# Patient Record
Sex: FEMALE | Race: BLACK OR AFRICAN AMERICAN | NOT HISPANIC OR LATINO | Employment: UNEMPLOYED | URBAN - METROPOLITAN AREA
[De-identification: names, ages, dates, MRNs, and addresses within clinical notes are randomized per-mention and may not be internally consistent; named-entity substitution may affect disease eponyms.]

---

## 2014-10-17 LAB — HIV1/HIV2 ANTIBODY: NON REACTIVE

## 2016-05-23 LAB — HEP C VIRUS AB: NEGATIVE

## 2017-06-07 ENCOUNTER — HOSPITAL ENCOUNTER (EMERGENCY)
Facility: OTHER | Age: 57
Discharge: HOME | End: 2017-06-07
Attending: INTERNAL MEDICINE
Payer: COMMERCIAL

## 2017-06-07 DIAGNOSIS — M94.0 COSTOCHONDRITIS: Primary | ICD-10-CM

## 2017-06-07 LAB
ALBUMIN SERPL-MCNC: 4.3 G/DL (ref 3.3–5.5)
ALP SERPL-CCNC: 87 U/L (ref 42–141)
BILIRUB SERPL-MCNC: 0.5 MG/DL (ref 0.2–1.6)
BUN SERPL-MCNC: 11 MG/DL (ref 7–22)
CALCIUM SERPL-MCNC: 10 MG/DL (ref 8–10.3)
CHLORIDE SERPL-SCNC: 105 MMOL/L (ref 98–108)
CREAT SERPL-MCNC: 0.8 MG/DL (ref 0.6–1.2)
GLUCOSE SERPL-MCNC: 135 MG/DL (ref 73–118)
POC ALT (SGPT): 26 U/L (ref 10–47)
POC AST (SGOT): 28 U/L (ref 11–38)
POC TCO2: 27 MMOL/L (ref 18–33)
POTASSIUM BLD-SCNC: 3.9 MMOL/L (ref 3.6–5.1)
PROTEIN, POC: 8.7 G/DL (ref 6.4–8.1)
SODIUM BLD-SCNC: 141 MMOL/L (ref 128–145)

## 2017-06-07 PROCEDURE — 80053 COMPREHEN METABOLIC PANEL: CPT

## 2017-06-07 PROCEDURE — 99283 EMERGENCY DEPT VISIT LOW MDM: CPT | Mod: 25

## 2017-06-07 PROCEDURE — 85025 COMPLETE CBC W/AUTO DIFF WBC: CPT

## 2017-06-07 PROCEDURE — 84484 ASSAY OF TROPONIN QUANT: CPT

## 2017-06-07 RX ORDER — IBUPROFEN 800 MG/1
800 TABLET ORAL EVERY 8 HOURS PRN
Qty: 20 TABLET | Refills: 0
Start: 2017-06-07 | End: 2020-03-10

## 2017-06-07 RX ORDER — ASPIRIN 325 MG
TABLET ORAL
Status: DISPENSED
Start: 2017-06-07 | End: 2017-06-08

## 2017-06-07 RX ORDER — ACETAMINOPHEN 500 MG
TABLET ORAL
Status: DISPENSED
Start: 2017-06-07 | End: 2017-06-08

## 2017-06-08 NOTE — ED PROVIDER NOTES
Encounter Date: 6/7/2017       History   No chief complaint on file.    Review of patient's allergies indicates:  No Known Allergies  56-year-old female presents to the emergency department with chest pain ×2 days.  Patient states the pain is worse upon movement.      The history is provided by the patient. No  was used.   Chest Pain   The current episode started yesterday. Duration of episode(s) is 2 days. Chest pain occurs constantly. The chest pain is unchanged. The pain is associated with breathing. At its most intense, the chest pain is at 5/10. The chest pain is currently at 5/10. The quality of the pain is described as sharp. The pain does not radiate. Chest pain is worsened by certain positions. Pertinent negatives for primary symptoms include no fever and no palpitations. She tried nothing for the symptoms.     No past medical history on file.  No past surgical history on file.  No family history on file.  Social History   Substance Use Topics    Smoking status: Not on file    Smokeless tobacco: Not on file    Alcohol use Not on file     Review of Systems   Constitutional: Negative for fever.   Respiratory: Negative.    Cardiovascular: Positive for chest pain. Negative for palpitations and leg swelling.   Musculoskeletal: Negative.    Skin: Negative.    Neurological: Negative.    Hematological: Negative.    All other systems reviewed and are negative.      Physical Exam     Initial Vitals   BP Pulse Resp Temp SpO2   -- -- -- -- --     Physical Exam    Nursing note and vitals reviewed.  Constitutional: She appears well-developed and well-nourished. No distress.   HENT:   Head: Normocephalic and atraumatic.   Eyes: Conjunctivae and EOM are normal.   Neck: Normal range of motion.   Cardiovascular: Normal rate and regular rhythm.   Pulmonary/Chest: Breath sounds normal. No respiratory distress. She exhibits tenderness.   Abdominal: Soft. Bowel sounds are normal.   Musculoskeletal: Normal  range of motion.   Neurological: She is alert. She has normal strength.   Skin: Skin is warm and dry. Capillary refill takes less than 2 seconds.   Psychiatric: She has a normal mood and affect.         ED Course   Procedures  Labs Reviewed   POCT CMP - Abnormal; Notable for the following:        Result Value    POC Glucose 135 (*)     Protein 8.7 (*)     All other components within normal limits             Medical Decision Making:   Initial Assessment:   56-year-old female presents to the emergency department with chest pain ×2 days.  Patient states the pain is worse upon movement.  Differential Diagnosis:   Costochondritis  Pleurisy  Myocardial infarction  ED Management:  EKG was within normal limits, troponin was normal and CBC and CMP were unremarkable.  Patient was given instructions for costochondritis and a prescription for ibuprofen.  She was advised to follow-up with her primary care physician tomorrow for reevaluation.                   ED Course     Clinical Impression:   The primary diagnosis is costochondritis  Disposition:   Disposition: Discharged  Condition: Stable       Ajay Acevedo MD  06/08/17 0584

## 2020-03-10 ENCOUNTER — HOSPITAL ENCOUNTER (EMERGENCY)
Facility: HOSPITAL | Age: 60
Discharge: HOME | End: 2020-03-10
Attending: EMERGENCY MEDICINE
Payer: COMMERCIAL

## 2020-03-10 VITALS
RESPIRATION RATE: 16 BRPM | OXYGEN SATURATION: 99 % | TEMPERATURE: 98 F | SYSTOLIC BLOOD PRESSURE: 161 MMHG | BODY MASS INDEX: 34.23 KG/M2 | HEART RATE: 79 BPM | HEIGHT: 66 IN | DIASTOLIC BLOOD PRESSURE: 75 MMHG | WEIGHT: 213 LBS

## 2020-03-10 DIAGNOSIS — J11.1 INFLUENZA-LIKE ILLNESS: Primary | ICD-10-CM

## 2020-03-10 DIAGNOSIS — R51.9 ACUTE NONINTRACTABLE HEADACHE, UNSPECIFIED HEADACHE TYPE: ICD-10-CM

## 2020-03-10 LAB
BILIRUBIN, POC UA: NEGATIVE
BLOOD, POC UA: NEGATIVE
CLARITY, POC UA: CLEAR
COLOR, POC UA: YELLOW
CTP QC/QA: YES
GLUCOSE, POC UA: NEGATIVE
KETONES, POC UA: NEGATIVE
LEUKOCYTE EST, POC UA: NEGATIVE
NITRITE, POC UA: NEGATIVE
PH UR STRIP: 6 [PH]
POC MOLECULAR INFLUENZA A AGN: NEGATIVE
POC MOLECULAR INFLUENZA B AGN: NEGATIVE
POCT GLUCOSE: 134 MG/DL (ref 70–110)
PROTEIN, POC UA: NEGATIVE
SPECIFIC GRAVITY, POC UA: >=1.03
UROBILINOGEN, POC UA: 0.2 E.U./DL

## 2020-03-10 PROCEDURE — 87502 INFLUENZA DNA AMP PROBE: CPT | Mod: ER

## 2020-03-10 PROCEDURE — 99284 EMERGENCY DEPT VISIT MOD MDM: CPT | Mod: 25,ER

## 2020-03-10 PROCEDURE — 63600175 PHARM REV CODE 636 W HCPCS: Mod: ER | Performed by: EMERGENCY MEDICINE

## 2020-03-10 PROCEDURE — 81003 URINALYSIS AUTO W/O SCOPE: CPT | Mod: ER

## 2020-03-10 PROCEDURE — 82962 GLUCOSE BLOOD TEST: CPT | Mod: ER

## 2020-03-10 PROCEDURE — 96372 THER/PROPH/DIAG INJ SC/IM: CPT | Mod: 59,ER

## 2020-03-10 RX ORDER — DIPHENHYDRAMINE HYDROCHLORIDE 50 MG/ML
25 INJECTION INTRAMUSCULAR; INTRAVENOUS
Status: COMPLETED | OUTPATIENT
Start: 2020-03-10 | End: 2020-03-10

## 2020-03-10 RX ORDER — PROMETHAZINE HYDROCHLORIDE AND DEXTROMETHORPHAN HYDROBROMIDE 6.25; 15 MG/5ML; MG/5ML
5 SYRUP ORAL 3 TIMES DAILY PRN
Qty: 200 ML | Refills: 0 | Status: SHIPPED | OUTPATIENT
Start: 2020-03-10 | End: 2020-03-20

## 2020-03-10 RX ORDER — PROMETHAZINE HYDROCHLORIDE 25 MG/1
25 TABLET ORAL EVERY 6 HOURS PRN
Qty: 15 TABLET | Refills: 0 | Status: SHIPPED | OUTPATIENT
Start: 2020-03-10 | End: 2021-04-14 | Stop reason: ALTCHOICE

## 2020-03-10 RX ORDER — PROCHLORPERAZINE EDISYLATE 5 MG/ML
10 INJECTION INTRAMUSCULAR; INTRAVENOUS ONCE
Status: COMPLETED | OUTPATIENT
Start: 2020-03-10 | End: 2020-03-10

## 2020-03-10 RX ORDER — OSELTAMIVIR PHOSPHATE 75 MG/1
75 CAPSULE ORAL 2 TIMES DAILY
Qty: 10 CAPSULE | Refills: 0 | Status: SHIPPED | OUTPATIENT
Start: 2020-03-10 | End: 2020-03-13 | Stop reason: ALTCHOICE

## 2020-03-10 RX ORDER — IBUPROFEN 600 MG/1
600 TABLET ORAL EVERY 6 HOURS PRN
Qty: 20 TABLET | Refills: 0 | OUTPATIENT
Start: 2020-03-10 | End: 2021-02-09

## 2020-03-10 RX ORDER — DIPHENHYDRAMINE HCL 25 MG
25 CAPSULE ORAL EVERY 6 HOURS PRN
Qty: 20 CAPSULE | Refills: 0 | Status: SHIPPED | OUTPATIENT
Start: 2020-03-10 | End: 2021-04-14 | Stop reason: ALTCHOICE

## 2020-03-10 RX ORDER — KETOROLAC TROMETHAMINE 30 MG/ML
30 INJECTION, SOLUTION INTRAMUSCULAR; INTRAVENOUS
Status: COMPLETED | OUTPATIENT
Start: 2020-03-10 | End: 2020-03-10

## 2020-03-10 RX ORDER — DIPHENHYDRAMINE HYDROCHLORIDE 50 MG/ML
25 INJECTION INTRAMUSCULAR; INTRAVENOUS
Status: DISCONTINUED | OUTPATIENT
Start: 2020-03-10 | End: 2020-03-10

## 2020-03-10 RX ADMIN — DIPHENHYDRAMINE HYDROCHLORIDE 25 MG: 50 INJECTION INTRAMUSCULAR; INTRAVENOUS at 02:03

## 2020-03-10 RX ADMIN — PROCHLORPERAZINE EDISYLATE 10 MG: 5 INJECTION INTRAMUSCULAR; INTRAVENOUS at 02:03

## 2020-03-10 RX ADMIN — KETOROLAC TROMETHAMINE 30 MG: 30 INJECTION, SOLUTION INTRAMUSCULAR at 02:03

## 2020-03-10 NOTE — ED NOTES
59 y.o. Female with PMH HTN, DM, hyperlipidemia to ED with c.o. Dry mouth, cracked lips, and dry tongue x1 month, 9/10 constant headache x 3 days with no relief from OTC meds, sore throat cough, nausea, diarrhea started yesterday. Patient also endorses generalized body aches and weakness. Patient denies blurred vision with headache, denies photophobia, +PERRL, equal strength and sensation bilaterally.

## 2020-03-10 NOTE — ED PROVIDER NOTES
"Encounter Date: 3/10/2020    SCRIBE #1 NOTE: I, Janelle Chung, am scribing for, and in the presence of,  Dr. Mitchell . I have scribed the following portions of the note - Other sections scribed: HPI, ROS, PE.       History     Chief Complaint   Patient presents with    Headache     x 3 days, dry mouth     59 y.o. female with "I have the flu" and headache x 3 days. Headache is localized on bilateral sides. Headache is worsened with loud noises and bright lights.  she reports this is "not the worst headache of my life".  Patient reports she gets frequent headaches.  Patient reports cough, congestion, bodyaches, chills, rhinorrhea, and dry mouth. These symptoms began last night and she reports "I feel like I have the flu". She denies photophobia, numbness, weakness, tingling, any other signs of a stroke, nausea, or vomiting. She denies relief with Nyquil and DayQuil. Denies smoking. Reports occasional ETOH usage. Denies marijuana, cocaine or street drugs. Reports sufficient water intake. Patient is a diabetic.      The history is provided by the patient. No  was used.     Review of patient's allergies indicates:   Allergen Reactions    Oxycodone-acetaminophen Itching     Past Medical History:   Diagnosis Date    Diabetes mellitus     does not take any meds for same    High cholesterol     Hypertension      Past Surgical History:   Procedure Laterality Date     SECTION      CHOLECYSTECTOMY      HYSTERECTOMY       History reviewed. No pertinent family history.  Social History     Tobacco Use    Smoking status: Never Smoker   Substance Use Topics    Alcohol use: Yes     Comment: socially    Drug use: Not on file     Review of Systems   Constitutional: Positive for chills. Negative for fever.   HENT: Positive for congestion and rhinorrhea. Negative for sore throat.    Eyes: Negative for photophobia.   Respiratory: Negative for shortness of breath.    Cardiovascular: Negative for " chest pain.   Gastrointestinal: Negative for abdominal pain, nausea and vomiting.   Genitourinary: Negative for dysuria.   Musculoskeletal: Positive for myalgias. Negative for back pain.   Skin: Negative for rash.   Neurological: Positive for headaches. Negative for weakness and numbness.   Hematological: Does not bruise/bleed easily.   All other systems reviewed and are negative.      Physical Exam     Initial Vitals [03/10/20 1043]   BP Pulse Resp Temp SpO2   (!) 165/85 84 19 98.4 °F (36.9 °C) 98 %      MAP       --         Patient gave consent to have physical exam performed.  .    Physical Exam    Nursing note and vitals reviewed.  Constitutional: Vital signs are normal. She appears well-developed and well-nourished.   HENT:   Head: Normocephalic and atraumatic.   Right Ear: External ear normal.   Left Ear: External ear normal.   Nose: Mucosal edema and rhinorrhea present. Right sinus exhibits no maxillary sinus tenderness. Left sinus exhibits no maxillary sinus tenderness.   Mouth/Throat: Mucous membranes are normal. Posterior oropharyngeal erythema present. No oropharyngeal exudate or posterior oropharyngeal edema.   Post nasal drip appreciated on exam    Eyes: Conjunctivae are normal.   Neck: Normal range of motion. Neck supple.   Cardiovascular: Normal rate, regular rhythm, normal heart sounds, intact distal pulses and normal pulses. Exam reveals no gallop, no friction rub and no decreased pulses.    No murmur heard.  Pulmonary/Chest: Effort normal and breath sounds normal. No respiratory distress. She has no wheezes. She has no rhonchi. She has no rales. She exhibits no tenderness.   Abdominal: Soft. Normal appearance and bowel sounds are normal. She exhibits no distension. There is no tenderness. There is no rigidity, no rebound and no guarding.   Musculoskeletal: Normal range of motion. She exhibits no edema or tenderness.   Lymphadenopathy:     She has no cervical adenopathy.   Neurological: She is alert  and oriented to person, place, and time. She has normal strength. No cranial nerve deficit or sensory deficit. GCS score is 15. GCS eye subscore is 4. GCS verbal subscore is 5. GCS motor subscore is 6.   Cranial nerves II-XII intact. Sensation grossly intact. Bilateral  strength equal. Strength 5/5 to all extremities. Deep tendon reflexes normal.     Skin: Skin is warm and dry. Capillary refill takes less than 2 seconds. No rash noted.   Psychiatric: She has a normal mood and affect.         ED Course   Procedures  Labs Reviewed   POCT URINALYSIS W/O SCOPE - Abnormal; Notable for the following components:       Result Value    Spec Grav UA >=1.030 (*)     All other components within normal limits   POCT GLUCOSE - Abnormal; Notable for the following components:    POCT Glucose 134 (*)     All other components within normal limits   POCT INFLUENZA A/B MOLECULAR               Medical Decision Making:   History:   Old Medical Records: I decided to obtain old medical records.  Clinical Tests:   Lab Tests: Ordered and Reviewed  Chief complaint:  I have the flu and Headache. Patient is also worried that her sugar might be elevated.  Differential diagnosis:  Hyperglycemia, hypoglycemia, Influenza A, Influenza B, otitis media, pharyngitis, urinary tract infection, headache, and intractable headache    Treatment in the ED: PE, ketorolac injection 30 mg, diphenhydramine injection 25 mg, and prochlorperazine injection Soln 10 mg   Patient reports feeling better after treatment in the ER.      Discussed treatment, prescriptions, labs, and imaging results.    Fill and take prescriptions as directed.  Return to the ED if symptoms worsen or do not resolve.   Answered questions and discussed discharge plan.    Patient feels better and is ready for discharge.  Follow up with PCP/specialist in 1 day.            Scribe Attestation:   Scribe #1: I performed the above scribed service and the documentation accurately describes the  services I performed. I attest to the accuracy of the note.     I, Dr. Alba Mitchell, personally performed the services described in this documentation. This document was produced by a scribe under my direction and in my presence. All medical record entries made by the scribe were at my direction and in my presence.  I have reviewed the chart and agree that the record reflects my personal performance and is accurate and complete. Alba Mitchell DO.     03/10/2020 2:35 PM                        Clinical Impression:     1. Influenza-like illness    2. Acute nonintractable headache, unspecified headache type                                   Alba Mitchell DO  03/12/20 9033

## 2020-03-12 NOTE — PROGRESS NOTES
This note was created by combination of typed  and M-Modal dictation.  Transcription errors may be present.  If there are any questions, please contact me.    Assessment / Plan:   URI, acute  -getting better.  Can stop the tamiflu. Normal exam.     Type 2 diabetes mellitus without complication, without long-term current use of insulin  -hx of metformin ER with GI SE.  But would be willing to rechallenge. Start metformin 500 and titrate up to 1000 bid or until max tolerated dose.  Plan for eye cam next time  Testing supplies sent  Future labs.  -     CBC auto differential; Future; Expected date: 06/13/2020  -     Comprehensive metabolic panel; Future; Expected date: 06/13/2020  -     Lipid panel; Future; Expected date: 06/13/2020  -     Hemoglobin A1c; Future; Expected date: 06/13/2020  -     Microalbumin/creatinine urine ratio; Future; Expected date: 06/13/2020  -     metFORMIN (GLUCOPHAGE) 500 MG tablet; 1 po qAM x 1 wk; then 1 po BID x 1 wk; then 2 AM/1 PM x 1 wk; then 2 BID maintenance. Take with food  Dispense: 120 tablet; Refill: 5  -     blood-glucose meter kit; To check BG 1 times daily, to use with insurance preferred meter  Dispense: 1 each; Refill: 0  -     blood sugar diagnostic Strp; ONCE DAILY BLOOD SUGAR TESTING; WHICHEVER BRAND COVERED BY INSURANCE  Dispense: 30 strip; Refill: 11  -     lancets (ONETOUCH DELICA LANCETS) 33 gauge Misc; 1 lancet by Misc.(Non-Drug; Combo Route) route once daily. ONCE DAILY BLOOD SUGAR TESTING; [employer]IC[employer] BRAND COVERED BY INSURANCE  Dispense: 30 each; Refill: 11    Essential hypertension  -restart lisinopril and hctz. Hx of amlodipine in the past. May need to be added on if not controlled  -     lisinopriL (PRINIVIL,ZESTRIL) 40 MG tablet; Take 1 tablet (40 mg total) by mouth once daily.  Dispense: 90 tablet; Refill: 3  -     hydroCHLOROthiazide (HYDRODIURIL) 25 MG tablet; Take 1 tablet (25 mg total) by mouth once daily.  Dispense: 30 tablet; Refill:  11    Dyslipidemia  -restart lipitor.  -     atorvastatin (LIPITOR) 40 MG tablet; Take 1 tablet (40 mg total) by mouth once daily.  Dispense: 90 tablet; Refill: 3    Primary insomnia hx of melatonin without relief; hx of trazodone    Medications Discontinued During This Encounter   Medication Reason    oseltamivir (TAMIFLU) 75 MG capsule Therapy completed       meds sent this encounter:  Medications Ordered This Encounter   Medications    atorvastatin (LIPITOR) 40 MG tablet     Sig: Take 1 tablet (40 mg total) by mouth once daily.     Dispense:  90 tablet     Refill:  3    blood sugar diagnostic Strp     Sig: ONCE DAILY BLOOD SUGAR TESTING; WHICHEVER BRAND COVERED BY INSURANCE     Dispense:  30 strip     Refill:  11    blood-glucose meter kit     Sig: To check BG 1 times daily, to use with insurance preferred meter     Dispense:  1 each     Refill:  0    hydroCHLOROthiazide (HYDRODIURIL) 25 MG tablet     Sig: Take 1 tablet (25 mg total) by mouth once daily.     Dispense:  30 tablet     Refill:  11    lancets (ONETOUCH DELICA LANCETS) 33 gauge Misc     Si lancet by Misc.(Non-Drug; Combo Route) route once daily. ONCE DAILY BLOOD SUGAR TESTING; [employer]IC[employer] BRAND COVERED BY INSURANCE     Dispense:  30 each     Refill:  11    lisinopriL (PRINIVIL,ZESTRIL) 40 MG tablet     Sig: Take 1 tablet (40 mg total) by mouth once daily.     Dispense:  90 tablet     Refill:  3    metFORMIN (GLUCOPHAGE) 500 MG tablet     Si po qAM x 1 wk; then 1 po BID x 1 wk; then 2 AM/1 PM x 1 wk; then 2 BID maintenance. Take with food     Dispense:  120 tablet     Refill:  5       Follow Up: No follow-ups on file. nurse visit 2 weeks for BP check. OV 3 months with previsit labs.      Subjective:     Chief Complaint   Patient presents with    Hospital Follow Up    Generalized Body Aches    Chills       GINGER Sarmiento is a 59 y.o. female, last appointment with this clinic was Visit date not found.    No LMP recorded. Patient has had a  hysterectomy.    NP  DM2; saxagliptin  HTN; hx of hctz, amlodipine, lisinopril, hx of propranolol  Dyslipidemia, hx of rosuvastatin  GERD  Insomnia hx of melatonin without relief. Sleep hygiene improvement without relief. trazodone  Hx of headache. Hx of neuro eval for headaches. topamax    ER visit for flu like symptoms. Flu A neg. Discharged on tamiflu    2016 HCV screen negative.  2017 a1 7.3    Sx started with headache and then developed URI sx.  Loss of taste; sore throat; minimal cough; rhinorrhea. Main concern was headache and loss of taste.    The headache is much better. Still with loss of taste.  Taking tamiflu.  Wonders if SE is itching - started last night.   Has taken phenergan before without issue. Getting chills. No sick contacts/nonspecific.     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     Is not taking any meds.  Most recently taking saxagliptin, but expensive.  lisinopril, crestor.    Metformin with GI SE.  Not completely intolerable.was on ER 1000 BID.  Would be willing to rechallenge.    Patient Care Team:  Diaz Yip MD as PCP - General (Internal Medicine)    Patient Active Problem List    Diagnosis Date Noted    Primary insomnia hx of melatonin without relief; hx of trazodone 2020    GERD (gastroesophageal reflux disease) 2016    Dyslipidemia hx of crestor, lipitor 10/19/2015    Headache chronic with hx of topamax hx of propranolol 10/19/2015    Essential hypertension hx of hctz, amlodipine, lisinopril, propranolol 2014    Type 2 diabetes mellitus without complication, without long-term current use of insulin hx metformin with GI SE; saxagliptin expensive 2014       PAST MEDICAL HISTORY:  Past Medical History:   Diagnosis Date    Diabetes mellitus     does not take any meds for same    High cholesterol     Hypertension        PAST SURGICAL HISTORY:  Past Surgical History:   Procedure Laterality Date     SECTION       CHOLECYSTECTOMY      HYSTERECTOMY       Family History   Problem Relation Age of Onset    Coronary artery disease Mother     Diabetes Mother     Hypertension Mother     Stroke Father     Hypertension Father     Prostate cancer Father     Lung cancer Brother     Stomach cancer Brother        SOCIAL HISTORY:  Social History     Socioeconomic History    Marital status: Single     Spouse name: Not on file    Number of children: Not on file    Years of education: Not on file    Highest education level: Not on file   Occupational History    Occupation: care giver.     Employer: [employer]   Social Needs    Financial resource strain: Not on file    Food insecurity:     Worry: Not on file     Inability: Not on file    Transportation needs:     Medical: Not on file     Non-medical: Not on file   Tobacco Use    Smoking status: Never Smoker    Smokeless tobacco: Never Used   Substance and Sexual Activity    Alcohol use: Yes     Comment: socially    Drug use: Never    Sexual activity: Not on file   Lifestyle    Physical activity:     Days per week: Not on file     Minutes per session: Not on file    Stress: Not on file   Relationships    Social connections:     Talks on phone: Not on file     Gets together: Not on file     Attends Hindu service: Not on file     Active member of club or organization: Not on file     Attends meetings of clubs or organizations: Not on file     Relationship status: Not on file   Other Topics Concern    Not on file   Social History Narrative    Not on file        ALLERGIES AND MEDICATIONS: updated and reviewed.  Review of patient's allergies indicates:   Allergen Reactions    Oxycodone-acetaminophen Itching     Current Outpatient Medications   Medication Sig Dispense Refill    ibuprofen (ADVIL,MOTRIN) 600 MG tablet Take 1 tablet (600 mg total) by mouth every 6 (six) hours as needed for Pain (Take with food as needed for mild-to-moderate pain). 20 tablet 0     "oseltamivir (TAMIFLU) 75 MG capsule Take 1 capsule (75 mg total) by mouth 2 (two) times daily. for 5 days 10 capsule 0    promethazine (PHENERGAN) 25 MG tablet Take 1 tablet (25 mg total) by mouth every 6 (six) hours as needed for Nausea (Taken headache does not resolve). 15 tablet 0    diphenhydrAMINE (BENADRYL) 25 mg capsule Take 1 capsule (25 mg total) by mouth every 6 (six) hours as needed for Itching or Allergies (Take if headache does not resolve). (Patient not taking: Reported on 3/13/2020) 20 capsule 0    promethazine-dextromethorphan (PROMETHAZINE-DM) 6.25-15 mg/5 mL Syrp Take 5 mLs by mouth 3 (three) times daily as needed (cougn and congestion). (Patient not taking: Reported on 3/13/2020) 200 mL 0     No current facility-administered medications for this visit.        Review of Systems   Constitutional: Negative for chills, fever and malaise/fatigue.   HENT: Positive for congestion. Negative for ear pain, hearing loss and sore throat.    Respiratory: Negative for cough, sputum production and shortness of breath.    Cardiovascular: Negative for chest pain.   Musculoskeletal: Negative for myalgias and neck pain.   Skin: Negative for rash.   Neurological: Negative for headaches.       Objective:   Physical Exam   Vitals:    03/13/20 0821   BP: (!) 154/78   BP Location: Right arm   Patient Position: Sitting   BP Method: Medium (Manual)   Pulse: 76   Temp: 98.3 °F (36.8 °C)   TempSrc: Oral   SpO2: 98%   Weight: 88.9 kg (196 lb)   Height: 5' 6" (1.676 m)    Body mass index is 31.64 kg/m².  Weight: 88.9 kg (196 lb)   Height: 5' 6" (167.6 cm)     Physical Exam   Constitutional: She is oriented to person, place, and time. She appears well-developed and well-nourished.   HENT:   TMs grey/clear bilaterally.  OP no erythema no exudates   Eyes: EOM are normal.   Neck: Neck supple.   Cardiovascular: Normal rate, regular rhythm and normal heart sounds.   Pulmonary/Chest: Effort normal and breath sounds normal. She has " no wheezes.   Lymphadenopathy:     She has no cervical adenopathy.   Neurological: She is alert and oriented to person, place, and time.   Skin: Skin is warm and dry.   Psychiatric: She has a normal mood and affect. Her behavior is normal.

## 2020-03-13 ENCOUNTER — OFFICE VISIT (OUTPATIENT)
Dept: FAMILY MEDICINE | Facility: CLINIC | Age: 60
End: 2020-03-13
Payer: COMMERCIAL

## 2020-03-13 ENCOUNTER — TELEPHONE (OUTPATIENT)
Dept: FAMILY MEDICINE | Facility: CLINIC | Age: 60
End: 2020-03-13

## 2020-03-13 VITALS
TEMPERATURE: 98 F | WEIGHT: 196 LBS | BODY MASS INDEX: 31.5 KG/M2 | HEIGHT: 66 IN | DIASTOLIC BLOOD PRESSURE: 78 MMHG | HEART RATE: 76 BPM | OXYGEN SATURATION: 98 % | SYSTOLIC BLOOD PRESSURE: 154 MMHG

## 2020-03-13 DIAGNOSIS — F51.01 PRIMARY INSOMNIA: ICD-10-CM

## 2020-03-13 DIAGNOSIS — E78.5 DYSLIPIDEMIA: ICD-10-CM

## 2020-03-13 DIAGNOSIS — J06.9 URI, ACUTE: Primary | ICD-10-CM

## 2020-03-13 DIAGNOSIS — E11.9 TYPE 2 DIABETES MELLITUS WITHOUT COMPLICATION, WITHOUT LONG-TERM CURRENT USE OF INSULIN: ICD-10-CM

## 2020-03-13 DIAGNOSIS — I10 ESSENTIAL HYPERTENSION: ICD-10-CM

## 2020-03-13 PROBLEM — M94.0 COSTOCHONDRITIS: Status: RESOLVED | Noted: 2017-06-07 | Resolved: 2020-03-13

## 2020-03-13 PROCEDURE — 99999 PR PBB SHADOW E&M-EST. PATIENT-LVL III: ICD-10-PCS | Mod: PBBFAC,,, | Performed by: INTERNAL MEDICINE

## 2020-03-13 PROCEDURE — 3008F BODY MASS INDEX DOCD: CPT | Mod: CPTII,S$GLB,, | Performed by: INTERNAL MEDICINE

## 2020-03-13 PROCEDURE — 99204 OFFICE O/P NEW MOD 45 MIN: CPT | Mod: S$GLB,,, | Performed by: INTERNAL MEDICINE

## 2020-03-13 PROCEDURE — 99999 PR PBB SHADOW E&M-EST. PATIENT-LVL III: CPT | Mod: PBBFAC,,, | Performed by: INTERNAL MEDICINE

## 2020-03-13 PROCEDURE — 3078F DIAST BP <80 MM HG: CPT | Mod: CPTII,S$GLB,, | Performed by: INTERNAL MEDICINE

## 2020-03-13 PROCEDURE — 3077F PR MOST RECENT SYSTOLIC BLOOD PRESSURE >= 140 MM HG: ICD-10-PCS | Mod: CPTII,S$GLB,, | Performed by: INTERNAL MEDICINE

## 2020-03-13 PROCEDURE — 3077F SYST BP >= 140 MM HG: CPT | Mod: CPTII,S$GLB,, | Performed by: INTERNAL MEDICINE

## 2020-03-13 PROCEDURE — 3008F PR BODY MASS INDEX (BMI) DOCUMENTED: ICD-10-PCS | Mod: CPTII,S$GLB,, | Performed by: INTERNAL MEDICINE

## 2020-03-13 PROCEDURE — 99204 PR OFFICE/OUTPT VISIT, NEW, LEVL IV, 45-59 MIN: ICD-10-PCS | Mod: S$GLB,,, | Performed by: INTERNAL MEDICINE

## 2020-03-13 PROCEDURE — 3078F PR MOST RECENT DIASTOLIC BLOOD PRESSURE < 80 MM HG: ICD-10-PCS | Mod: CPTII,S$GLB,, | Performed by: INTERNAL MEDICINE

## 2020-03-13 RX ORDER — ATORVASTATIN CALCIUM 40 MG/1
40 TABLET, FILM COATED ORAL DAILY
Qty: 90 TABLET | Refills: 3 | Status: SHIPPED | OUTPATIENT
Start: 2020-03-13 | End: 2021-04-14 | Stop reason: SDUPTHER

## 2020-03-13 RX ORDER — HYDROCHLOROTHIAZIDE 25 MG/1
25 TABLET ORAL DAILY
Qty: 30 TABLET | Refills: 11 | Status: SHIPPED | OUTPATIENT
Start: 2020-03-13 | End: 2021-04-14 | Stop reason: SDUPTHER

## 2020-03-13 RX ORDER — INSULIN PUMP SYRINGE, 3 ML
EACH MISCELLANEOUS
Qty: 1 EACH | Refills: 0 | Status: SHIPPED | OUTPATIENT
Start: 2020-03-13

## 2020-03-13 RX ORDER — METFORMIN HYDROCHLORIDE 500 MG/1
TABLET ORAL
Qty: 120 TABLET | Refills: 5 | Status: SHIPPED | OUTPATIENT
Start: 2020-03-13 | End: 2021-04-14 | Stop reason: SINTOL

## 2020-03-13 RX ORDER — LANCETS 33 GAUGE
1 EACH MISCELLANEOUS DAILY
Qty: 30 EACH | Refills: 11 | Status: ON HOLD | OUTPATIENT
Start: 2020-03-13 | End: 2023-12-19 | Stop reason: HOSPADM

## 2020-03-13 RX ORDER — LISINOPRIL 40 MG/1
40 TABLET ORAL DAILY
Qty: 90 TABLET | Refills: 3 | Status: SHIPPED | OUTPATIENT
Start: 2020-03-13 | End: 2020-03-15 | Stop reason: SDUPTHER

## 2020-03-15 RX ORDER — LISINOPRIL 40 MG/1
40 TABLET ORAL DAILY
Qty: 90 TABLET | Refills: 3 | Status: SHIPPED | OUTPATIENT
Start: 2020-03-15 | End: 2021-04-14 | Stop reason: SDUPTHER

## 2020-03-20 DIAGNOSIS — Z12.11 COLON CANCER SCREENING: ICD-10-CM

## 2020-03-20 DIAGNOSIS — Z11.59 NEED FOR HEPATITIS C SCREENING TEST: ICD-10-CM

## 2020-03-20 DIAGNOSIS — Z12.39 BREAST CANCER SCREENING: ICD-10-CM

## 2020-03-22 ENCOUNTER — HOSPITAL ENCOUNTER (EMERGENCY)
Facility: HOSPITAL | Age: 60
Discharge: HOME | End: 2020-03-22
Attending: INTERNAL MEDICINE
Payer: COMMERCIAL

## 2020-03-22 VITALS
OXYGEN SATURATION: 100 % | TEMPERATURE: 99 F | DIASTOLIC BLOOD PRESSURE: 75 MMHG | HEART RATE: 86 BPM | WEIGHT: 191 LBS | SYSTOLIC BLOOD PRESSURE: 139 MMHG | RESPIRATION RATE: 18 BRPM | BODY MASS INDEX: 30.83 KG/M2

## 2020-03-22 DIAGNOSIS — R05.9 COUGH: ICD-10-CM

## 2020-03-22 DIAGNOSIS — B34.9 ACUTE VIRAL SYNDROME: Primary | ICD-10-CM

## 2020-03-22 LAB — POCT GLUCOSE: 181 MG/DL (ref 70–110)

## 2020-03-22 PROCEDURE — 82962 GLUCOSE BLOOD TEST: CPT | Mod: ER

## 2020-03-22 PROCEDURE — 99284 EMERGENCY DEPT VISIT MOD MDM: CPT | Mod: 25,ER

## 2020-03-22 RX ORDER — FLUTICASONE PROPIONATE 50 MCG
2 SPRAY, SUSPENSION (ML) NASAL DAILY
Qty: 15 G | Refills: 0 | OUTPATIENT
Start: 2020-03-22 | End: 2021-02-09

## 2020-03-22 RX ORDER — AZELASTINE 1 MG/ML
2 SPRAY, METERED NASAL 2 TIMES DAILY
Qty: 30 ML | Refills: 0 | OUTPATIENT
Start: 2020-03-22 | End: 2021-02-09

## 2020-03-22 NOTE — ED PROVIDER NOTES
"Encounter Date: 3/22/2020    SCRIBE #1 NOTE: I, Amy Whyte, am scribing for, and in the presence of,  Dr. Ajay Acevedo. I have scribed the following portions of the note - Other sections scribed: HPI, ROS, PE.       History     Chief Complaint   Patient presents with    Cough     Pt states, "Cough for two weeks."     Torsten Trevizo is a 59 y.o. female who presents to the ED complaining of acute cough x 2 weeks ago. Denies fever, chills, sore throat and nasal congestion.       The history is provided by the patient. No  was used.     Review of patient's allergies indicates:   Allergen Reactions    Oxycodone-acetaminophen Itching     Past Medical History:   Diagnosis Date    Diabetes mellitus     does not take any meds for same    High cholesterol     Hypertension      Past Surgical History:   Procedure Laterality Date     SECTION      CHOLECYSTECTOMY      HYSTERECTOMY       Family History   Problem Relation Age of Onset    Coronary artery disease Mother     Diabetes Mother     Hypertension Mother     Stroke Father     Hypertension Father     Prostate cancer Father     Lung cancer Brother     Stomach cancer Brother      Social History     Tobacco Use    Smoking status: Never Smoker    Smokeless tobacco: Never Used   Substance Use Topics    Alcohol use: Yes     Comment: socially    Drug use: Never     Review of Systems   Constitutional: Negative for chills and fever.   HENT: Negative for congestion and sore throat.    Respiratory: Positive for cough. Negative for shortness of breath.    Cardiovascular: Negative for chest pain.   Gastrointestinal: Negative for nausea and vomiting.   Genitourinary: Negative for dysuria.   Musculoskeletal: Negative for back pain.   Skin: Negative for rash.   Neurological: Negative for weakness.   Hematological: Negative for adenopathy.   Psychiatric/Behavioral: Negative for behavioral problems.   All other systems reviewed and are " negative.      Physical Exam     Initial Vitals [03/22/20 1737]   BP Pulse Resp Temp SpO2   (!) 154/77 76 16 99.6 °F (37.6 °C) 100 %      MAP       --         Physical Exam    Nursing note and vitals reviewed.  Constitutional: She appears well-developed and well-nourished.   HENT:   Head: Normocephalic and atraumatic.   Mouth/Throat: Uvula is midline and mucous membranes are normal. Posterior oropharyngeal erythema present. No oropharyngeal exudate or posterior oropharyngeal edema.   Enlarged nasal turbinates noted. Clear nasal discharge noted. Oropharyngeal erythema present. No oropharyngeal exudate or edema.      Eyes: Conjunctivae are normal.   Neck: Normal range of motion. Neck supple.   Cardiovascular: Normal rate, regular rhythm and normal heart sounds. Exam reveals no gallop and no friction rub.    No murmur heard.  Pulmonary/Chest: Breath sounds normal. No respiratory distress. She has no wheezes. She has no rhonchi. She has no rales.   Abdominal: Soft. There is no tenderness.   Musculoskeletal: Normal range of motion. She exhibits no edema.   Neurological: She is alert and oriented to person, place, and time. GCS score is 15. GCS eye subscore is 4. GCS verbal subscore is 5. GCS motor subscore is 6.   Skin: Skin is warm and dry.   Psychiatric: She has a normal mood and affect.         ED Course   Procedures  Labs Reviewed   POCT GLUCOSE, HAND-HELD DEVICE          Imaging Results    None          Medical Decision Making:   History:   Old Medical Records: I decided to obtain old medical records.  Initial Assessment:   Torsten Trevizo is a 59 y.o. female who presents to the ED complaining of acute cough x 2 weeks ago.   Independently Interpreted Test(s):   I have ordered and independently interpreted X-rays - see prior notes.  Clinical Tests:   Lab Tests: Ordered and Reviewed  Radiological Study: Ordered and Reviewed            Scribe Attestation:   Scribe #1: I performed the above scribed service and the  documentation accurately describes the services I performed. I attest to the accuracy of the note.               This document was produced by a scribe under my direction and in my presence. I agree with the content of the note and have made any necessary edits.     Dr. Acevedo    03/23/2020 1:19 PM             Clinical Impression:     1. Acute viral syndrome    2. Cough            Disposition:   Disposition: Discharged  Condition: Stable                        Ajay Acevedo MD  03/23/20 5120

## 2020-03-25 ENCOUNTER — TELEPHONE (OUTPATIENT)
Dept: FAMILY MEDICINE | Facility: CLINIC | Age: 60
End: 2020-03-25

## 2020-03-25 NOTE — TELEPHONE ENCOUNTER
Called patient advised your provider  canceled  Your appointment for blood pressure due to Corona virus. P Patient states she is taking her blood pressure medication everyday. She is mot taking her blood pressure at home. Advised to call here for any concerns. Patient verbalized an understanding of recommendations.

## 2020-06-01 ENCOUNTER — PATIENT OUTREACH (OUTPATIENT)
Dept: ADMINISTRATIVE | Facility: HOSPITAL | Age: 60
End: 2020-06-01

## 2020-06-01 PROBLEM — Z12.11 SCREENING FOR COLON CANCER: Status: ACTIVE | Noted: 2020-06-01

## 2020-06-16 ENCOUNTER — HOSPITAL ENCOUNTER (EMERGENCY)
Facility: HOSPITAL | Age: 60
Discharge: HOME | End: 2020-06-16
Attending: EMERGENCY MEDICINE

## 2020-06-16 VITALS
WEIGHT: 195 LBS | HEIGHT: 66 IN | HEART RATE: 88 BPM | RESPIRATION RATE: 18 BRPM | DIASTOLIC BLOOD PRESSURE: 77 MMHG | BODY MASS INDEX: 31.34 KG/M2 | SYSTOLIC BLOOD PRESSURE: 132 MMHG | TEMPERATURE: 98 F | OXYGEN SATURATION: 98 %

## 2020-06-16 DIAGNOSIS — K13.79 ORAL HEMORRHAGE: Primary | ICD-10-CM

## 2020-06-16 LAB — POCT GLUCOSE: 115 MG/DL (ref 70–110)

## 2020-06-16 PROCEDURE — 82962 GLUCOSE BLOOD TEST: CPT | Mod: ER

## 2020-06-16 PROCEDURE — 25000003 PHARM REV CODE 250: Mod: ER | Performed by: EMERGENCY MEDICINE

## 2020-06-16 PROCEDURE — 99283 EMERGENCY DEPT VISIT LOW MDM: CPT | Mod: ER

## 2020-06-16 RX ORDER — SILVER NITRATE 38.21; 12.74 MG/1; MG/1
1 STICK TOPICAL ONCE
Status: COMPLETED | OUTPATIENT
Start: 2020-06-16 | End: 2020-06-16

## 2020-06-16 RX ADMIN — SILVER NITRATE APPLICATORS 3 APPLICATOR: 25; 75 STICK TOPICAL at 09:06

## 2020-06-17 NOTE — ED PROVIDER NOTES
Encounter Date: 2020    SCRIBE #1 NOTE: I, Thelma Riggs, am scribing for, and in the presence of,  Dr. Viera. I have scribed the following portions of the note - Other sections scribed: HPI, ROS, PE.       History     Chief Complaint   Patient presents with    Dental Problem     PT PRESENTS TO ER WITH C/O GETTING TWO TEETH EXTRACTED TODAY AND SINCE 4PM HAS BEEN SPITTING OUT BLOOD CLOTS.       Torsten Trevizo is a 59 y.o. female who presents to the ED complaining of dental pain since having left maxillary second molar extracted earlier today. Patient also reports bleeding from the extraction site.    The history is provided by the patient. No  was used.     Review of patient's allergies indicates:   Allergen Reactions    Oxycodone-acetaminophen Itching     Past Medical History:   Diagnosis Date    Diabetes mellitus     does not take any meds for same    High cholesterol     Hypertension      Past Surgical History:   Procedure Laterality Date     SECTION      CHOLECYSTECTOMY      HYSTERECTOMY       Family History   Problem Relation Age of Onset    Coronary artery disease Mother     Diabetes Mother     Hypertension Mother     Stroke Father     Hypertension Father     Prostate cancer Father     Lung cancer Brother     Stomach cancer Brother      Social History     Tobacco Use    Smoking status: Never Smoker    Smokeless tobacco: Never Used   Substance Use Topics    Alcohol use: Yes     Comment: socially    Drug use: Never     Review of Systems   Constitutional: Negative.  Negative for fever.   HENT: Positive for dental problem. Negative for sore throat.    Eyes: Negative.    Respiratory: Negative.  Negative for shortness of breath.    Cardiovascular: Negative.  Negative for chest pain.   Gastrointestinal: Negative.  Negative for nausea and vomiting.   Endocrine: Negative.    Genitourinary: Negative.  Negative for dysuria.   Musculoskeletal: Negative.  Negative for  myalgias.   Skin: Negative.  Negative for rash.   Allergic/Immunologic: Negative.    Neurological: Negative.  Negative for headaches.   Hematological: Negative.  Negative for adenopathy.   Psychiatric/Behavioral: Negative.  Negative for behavioral problems.   All other systems reviewed and are negative.      Physical Exam     Initial Vitals   BP Pulse Resp Temp SpO2   06/16/20 2139 06/16/20 2043 06/16/20 2043 06/16/20 2043 06/16/20 2043   132/77 97 18 97 °F (36.1 °C) 99 %      MAP       --                Physical Exam    Nursing note and vitals reviewed.  Constitutional: She appears well-developed and well-nourished.   HENT:   Head: Normocephalic and atraumatic.   Right Ear: External ear normal.   Left Ear: External ear normal.   Nose: Nose normal.   Hemorrhaging from the left maxillary second molar.   Eyes: Conjunctivae are normal.   Neck: Normal range of motion. Neck supple.   Cardiovascular: Normal rate and intact distal pulses.   Pulmonary/Chest: Effort normal. No respiratory distress.   Abdominal: Soft. There is no abdominal tenderness.   Musculoskeletal: Normal range of motion.   Neurological: She is alert and oriented to person, place, and time.   Skin: Skin is warm and dry. Capillary refill takes less than 2 seconds.   Psychiatric: She has a normal mood and affect. Her behavior is normal.         ED Course   Procedures  Labs Reviewed   POCT GLUCOSE - Abnormal; Notable for the following components:       Result Value    POCT Glucose 115 (*)     All other components within normal limits   POCT GLUCOSE MONITORING CONTINUOUS          Imaging Results    None          Medical Decision Making:   History:   Old Medical Records: I decided to obtain old medical records.  Clinical Tests:   Lab Tests: Ordered and Reviewed  ED Management:  Let this serve is a procedure note:  This patient presented with hemorrhage from dental extraction.  Initially attempted pressure to the area with gauze without resolution.   Subsequently cauterized the area with sober nitrate sticks with continued bleeding.  That point I cut up small squares of quick clot packed them into the wound and had the patient bite down for approximately 30 min.  Upon final examination the bleeding was controlled.  Patient was instructed to go home with packing in place and remove it when ready for bed.  Patient is also instructed not to eat any solid foods.            Scribe Attestation:   Scribe #1: I performed the above scribed service and the documentation accurately describes the services I performed. I attest to the accuracy of the note.    This document was produced by a scribe under my direction and in my presence. I agree with the content of the note and have made any necessary edits.     Napoleon Viera MD    06/16/2020 10:40 PM                      Clinical Impression:     1. Oral hemorrhage              ED Disposition Condition    Discharge Stable        ED Prescriptions     None        Follow-up Information     Follow up With Specialties Details Why Contact Info    Dentist tomorrow  In 1 day                                       Napoleon Viera MD  06/16/20 8406

## 2020-06-17 NOTE — DISCHARGE INSTRUCTIONS
Remove packing when you get home.  Clear liquids only no food for the next 3 days.  Contact her dentist tomorrow

## 2021-02-09 ENCOUNTER — HOSPITAL ENCOUNTER (EMERGENCY)
Facility: HOSPITAL | Age: 61
Discharge: HOME | End: 2021-02-09
Attending: EMERGENCY MEDICINE
Payer: MEDICAID

## 2021-02-09 VITALS
HEART RATE: 95 BPM | TEMPERATURE: 99 F | WEIGHT: 211 LBS | HEIGHT: 66 IN | DIASTOLIC BLOOD PRESSURE: 95 MMHG | OXYGEN SATURATION: 98 % | RESPIRATION RATE: 18 BRPM | SYSTOLIC BLOOD PRESSURE: 153 MMHG | BODY MASS INDEX: 33.91 KG/M2

## 2021-02-09 DIAGNOSIS — J06.9 VIRAL URI WITH COUGH: Primary | ICD-10-CM

## 2021-02-09 DIAGNOSIS — J30.9 ALLERGIC RHINITIS, UNSPECIFIED SEASONALITY, UNSPECIFIED TRIGGER: ICD-10-CM

## 2021-02-09 LAB
CTP QC/QA: YES
INFLUENZA A ANTIGEN, POC: NEGATIVE
INFLUENZA B ANTIGEN, POC: NEGATIVE
POC RAPID STREP A: NEGATIVE
POCT GLUCOSE: 155 MG/DL (ref 70–110)
SARS-COV-2 RDRP RESP QL NAA+PROBE: NEGATIVE

## 2021-02-09 PROCEDURE — 87804 INFLUENZA ASSAY W/OPTIC: CPT | Mod: 59,ER

## 2021-02-09 PROCEDURE — 25000003 PHARM REV CODE 250: Mod: ER | Performed by: NURSE PRACTITIONER

## 2021-02-09 PROCEDURE — 99284 EMERGENCY DEPT VISIT MOD MDM: CPT | Mod: 25,ER

## 2021-02-09 PROCEDURE — 87502 INFLUENZA DNA AMP PROBE: CPT | Mod: ER

## 2021-02-09 PROCEDURE — 82962 GLUCOSE BLOOD TEST: CPT | Mod: 59,ER

## 2021-02-09 PROCEDURE — U0002 COVID-19 LAB TEST NON-CDC: HCPCS | Mod: ER | Performed by: NURSE PRACTITIONER

## 2021-02-09 RX ORDER — IBUPROFEN 600 MG/1
600 TABLET ORAL
Status: COMPLETED | OUTPATIENT
Start: 2021-02-09 | End: 2021-02-09

## 2021-02-09 RX ORDER — IBUPROFEN 600 MG/1
600 TABLET ORAL EVERY 6 HOURS PRN
Qty: 20 TABLET | Refills: 0 | Status: SHIPPED | OUTPATIENT
Start: 2021-02-09 | End: 2021-04-14 | Stop reason: ALTCHOICE

## 2021-02-09 RX ORDER — BENZONATATE 100 MG/1
100 CAPSULE ORAL 3 TIMES DAILY PRN
Qty: 30 CAPSULE | Refills: 0 | Status: SHIPPED | OUTPATIENT
Start: 2021-02-09 | End: 2021-02-19

## 2021-02-09 RX ORDER — PROMETHAZINE HYDROCHLORIDE AND DEXTROMETHORPHAN HYDROBROMIDE 6.25; 15 MG/5ML; MG/5ML
5 SYRUP ORAL NIGHTLY PRN
Qty: 180 ML | Refills: 0 | Status: SHIPPED | OUTPATIENT
Start: 2021-02-09 | End: 2021-02-19

## 2021-02-09 RX ORDER — AZELASTINE 1 MG/ML
1 SPRAY, METERED NASAL DAILY
Qty: 30 ML | Refills: 0 | Status: SHIPPED | OUTPATIENT
Start: 2021-02-09 | End: 2021-04-14 | Stop reason: ALTCHOICE

## 2021-02-09 RX ORDER — DEXTROMETHORPHAN HYDROBROMIDE, GUAIFENESIN 5; 100 MG/5ML; MG/5ML
650 LIQUID ORAL EVERY 8 HOURS
Qty: 20 TABLET | Refills: 0 | Status: SHIPPED | OUTPATIENT
Start: 2021-02-09 | End: 2021-04-14 | Stop reason: ALTCHOICE

## 2021-02-09 RX ADMIN — IBUPROFEN 600 MG: 600 TABLET ORAL at 12:02

## 2021-02-10 ENCOUNTER — PES CALL (OUTPATIENT)
Dept: ADMINISTRATIVE | Facility: CLINIC | Age: 61
End: 2021-02-10

## 2021-03-31 ENCOUNTER — PATIENT OUTREACH (OUTPATIENT)
Dept: ADMINISTRATIVE | Facility: HOSPITAL | Age: 61
End: 2021-03-31

## 2021-03-31 DIAGNOSIS — E78.5 DYSLIPIDEMIA: ICD-10-CM

## 2021-03-31 DIAGNOSIS — Z12.31 ENCOUNTER FOR SCREENING MAMMOGRAM FOR MALIGNANT NEOPLASM OF BREAST: Primary | ICD-10-CM

## 2021-03-31 DIAGNOSIS — E11.9 TYPE 2 DIABETES MELLITUS WITHOUT COMPLICATION, WITHOUT LONG-TERM CURRENT USE OF INSULIN: ICD-10-CM

## 2021-04-06 ENCOUNTER — LAB VISIT (OUTPATIENT)
Dept: LAB | Facility: HOSPITAL | Age: 61
End: 2021-04-06
Attending: INTERNAL MEDICINE
Payer: MEDICAID

## 2021-04-06 DIAGNOSIS — Z11.59 NEED FOR HEPATITIS C SCREENING TEST: ICD-10-CM

## 2021-04-06 DIAGNOSIS — E11.9 TYPE 2 DIABETES MELLITUS WITHOUT COMPLICATION, WITHOUT LONG-TERM CURRENT USE OF INSULIN: ICD-10-CM

## 2021-04-06 LAB
BASOPHILS # BLD AUTO: 0.06 K/UL (ref 0–0.2)
BASOPHILS NFR BLD: 0.8 % (ref 0–1.9)
DIFFERENTIAL METHOD: NORMAL
EOSINOPHIL # BLD AUTO: 0.2 K/UL (ref 0–0.5)
EOSINOPHIL NFR BLD: 2.4 % (ref 0–8)
ERYTHROCYTE [DISTWIDTH] IN BLOOD BY AUTOMATED COUNT: 12.6 % (ref 11.5–14.5)
ESTIMATED AVG GLUCOSE: 194 MG/DL (ref 68–131)
HBA1C MFR BLD: 8.4 % (ref 4–5.6)
HCT VFR BLD AUTO: 38.6 % (ref 37–48.5)
HGB BLD-MCNC: 12.4 G/DL (ref 12–16)
IMM GRANULOCYTES # BLD AUTO: 0.02 K/UL (ref 0–0.04)
IMM GRANULOCYTES NFR BLD AUTO: 0.3 % (ref 0–0.5)
LYMPHOCYTES # BLD AUTO: 2.3 K/UL (ref 1–4.8)
LYMPHOCYTES NFR BLD: 29.4 % (ref 18–48)
MCH RBC QN AUTO: 30.5 PG (ref 27–31)
MCHC RBC AUTO-ENTMCNC: 32.1 G/DL (ref 32–36)
MCV RBC AUTO: 95 FL (ref 82–98)
MONOCYTES # BLD AUTO: 0.5 K/UL (ref 0.3–1)
MONOCYTES NFR BLD: 6.2 % (ref 4–15)
NEUTROPHILS # BLD AUTO: 4.8 K/UL (ref 1.8–7.7)
NEUTROPHILS NFR BLD: 60.9 % (ref 38–73)
NRBC BLD-RTO: 0 /100 WBC
PLATELET # BLD AUTO: 298 K/UL (ref 150–450)
PMV BLD AUTO: 11.6 FL (ref 9.2–12.9)
RBC # BLD AUTO: 4.07 M/UL (ref 4–5.4)
WBC # BLD AUTO: 7.88 K/UL (ref 3.9–12.7)

## 2021-04-06 PROCEDURE — 80061 LIPID PANEL: CPT | Performed by: INTERNAL MEDICINE

## 2021-04-06 PROCEDURE — 83036 HEMOGLOBIN GLYCOSYLATED A1C: CPT | Performed by: INTERNAL MEDICINE

## 2021-04-06 PROCEDURE — 85025 COMPLETE CBC W/AUTO DIFF WBC: CPT | Performed by: INTERNAL MEDICINE

## 2021-04-06 PROCEDURE — 36415 COLL VENOUS BLD VENIPUNCTURE: CPT | Mod: PO | Performed by: INTERNAL MEDICINE

## 2021-04-06 PROCEDURE — 86803 HEPATITIS C AB TEST: CPT | Performed by: INTERNAL MEDICINE

## 2021-04-06 PROCEDURE — 80053 COMPREHEN METABOLIC PANEL: CPT | Performed by: INTERNAL MEDICINE

## 2021-04-07 LAB
ALBUMIN SERPL BCP-MCNC: 4.1 G/DL (ref 3.5–5.2)
ALP SERPL-CCNC: 84 U/L (ref 55–135)
ALT SERPL W/O P-5'-P-CCNC: 19 U/L (ref 10–44)
ANION GAP SERPL CALC-SCNC: 12 MMOL/L (ref 8–16)
AST SERPL-CCNC: 10 U/L (ref 10–40)
BILIRUB SERPL-MCNC: 0.4 MG/DL (ref 0.1–1)
BUN SERPL-MCNC: 14 MG/DL (ref 6–20)
CALCIUM SERPL-MCNC: 9.3 MG/DL (ref 8.7–10.5)
CHLORIDE SERPL-SCNC: 108 MMOL/L (ref 95–110)
CHOLEST SERPL-MCNC: 241 MG/DL (ref 120–199)
CHOLEST/HDLC SERPL: 4.1 {RATIO} (ref 2–5)
CO2 SERPL-SCNC: 22 MMOL/L (ref 23–29)
CREAT SERPL-MCNC: 0.8 MG/DL (ref 0.5–1.4)
EST. GFR  (AFRICAN AMERICAN): >60 ML/MIN/1.73 M^2
EST. GFR  (NON AFRICAN AMERICAN): >60 ML/MIN/1.73 M^2
GLUCOSE SERPL-MCNC: 162 MG/DL (ref 70–110)
HCV AB SERPL QL IA: NEGATIVE
HDLC SERPL-MCNC: 59 MG/DL (ref 40–75)
HDLC SERPL: 24.5 % (ref 20–50)
LDLC SERPL CALC-MCNC: 160.2 MG/DL (ref 63–159)
NONHDLC SERPL-MCNC: 182 MG/DL
POTASSIUM SERPL-SCNC: 4.3 MMOL/L (ref 3.5–5.1)
PROT SERPL-MCNC: 7.8 G/DL (ref 6–8.4)
SODIUM SERPL-SCNC: 142 MMOL/L (ref 136–145)
TRIGL SERPL-MCNC: 109 MG/DL (ref 30–150)

## 2021-04-13 ENCOUNTER — TELEPHONE (OUTPATIENT)
Dept: FAMILY MEDICINE | Facility: CLINIC | Age: 61
End: 2021-04-13

## 2021-04-14 ENCOUNTER — OFFICE VISIT (OUTPATIENT)
Dept: FAMILY MEDICINE | Facility: CLINIC | Age: 61
End: 2021-04-14
Payer: MEDICAID

## 2021-04-14 ENCOUNTER — HOSPITAL ENCOUNTER (OUTPATIENT)
Dept: RADIOLOGY | Facility: HOSPITAL | Age: 61
Discharge: HOME | End: 2021-04-14
Attending: INTERNAL MEDICINE
Payer: MEDICAID

## 2021-04-14 VITALS
HEART RATE: 62 BPM | WEIGHT: 202 LBS | BODY MASS INDEX: 32.47 KG/M2 | OXYGEN SATURATION: 97 % | RESPIRATION RATE: 17 BRPM | TEMPERATURE: 98 F | HEIGHT: 66 IN | SYSTOLIC BLOOD PRESSURE: 142 MMHG | DIASTOLIC BLOOD PRESSURE: 84 MMHG

## 2021-04-14 DIAGNOSIS — Z12.31 ENCOUNTER FOR SCREENING MAMMOGRAM FOR MALIGNANT NEOPLASM OF BREAST: ICD-10-CM

## 2021-04-14 DIAGNOSIS — R11.0 NAUSEA: ICD-10-CM

## 2021-04-14 DIAGNOSIS — E78.5 DYSLIPIDEMIA: ICD-10-CM

## 2021-04-14 DIAGNOSIS — I10 ESSENTIAL HYPERTENSION: ICD-10-CM

## 2021-04-14 DIAGNOSIS — Z12.39 BREAST CANCER SCREENING: ICD-10-CM

## 2021-04-14 DIAGNOSIS — E11.9 TYPE 2 DIABETES MELLITUS WITHOUT COMPLICATION, WITHOUT LONG-TERM CURRENT USE OF INSULIN: Primary | ICD-10-CM

## 2021-04-14 PROCEDURE — 99999 PR PBB SHADOW E&M-EST. PATIENT-LVL III: CPT | Mod: PBBFAC,,, | Performed by: INTERNAL MEDICINE

## 2021-04-14 PROCEDURE — 77063 BREAST TOMOSYNTHESIS BI: CPT | Mod: 26,,, | Performed by: RADIOLOGY

## 2021-04-14 PROCEDURE — 77067 SCR MAMMO BI INCL CAD: CPT | Mod: TC,PO

## 2021-04-14 PROCEDURE — 99999 PR PBB SHADOW E&M-EST. PATIENT-LVL III: ICD-10-PCS | Mod: PBBFAC,,, | Performed by: INTERNAL MEDICINE

## 2021-04-14 PROCEDURE — 77063 MAMMO DIGITAL SCREENING BILAT WITH TOMOSYNTHESIS_CAD: ICD-10-PCS | Mod: 26,,, | Performed by: RADIOLOGY

## 2021-04-14 PROCEDURE — 99213 OFFICE O/P EST LOW 20 MIN: CPT | Mod: PBBFAC,PO | Performed by: INTERNAL MEDICINE

## 2021-04-14 PROCEDURE — 99214 PR OFFICE/OUTPT VISIT, EST, LEVL IV, 30-39 MIN: ICD-10-PCS | Mod: S$PBB,,, | Performed by: INTERNAL MEDICINE

## 2021-04-14 PROCEDURE — 99214 OFFICE O/P EST MOD 30 MIN: CPT | Mod: S$PBB,,, | Performed by: INTERNAL MEDICINE

## 2021-04-14 PROCEDURE — 77067 SCR MAMMO BI INCL CAD: CPT | Mod: 26,,, | Performed by: RADIOLOGY

## 2021-04-14 PROCEDURE — 77067 MAMMO DIGITAL SCREENING BILAT WITH TOMOSYNTHESIS_CAD: ICD-10-PCS | Mod: 26,,, | Performed by: RADIOLOGY

## 2021-04-14 RX ORDER — ATORVASTATIN CALCIUM 40 MG/1
40 TABLET, FILM COATED ORAL DAILY
Qty: 90 TABLET | Refills: 3 | Status: SHIPPED | OUTPATIENT
Start: 2021-04-14 | End: 2022-02-07 | Stop reason: SDUPTHER

## 2021-04-14 RX ORDER — ONDANSETRON 4 MG/1
4 TABLET, ORALLY DISINTEGRATING ORAL 2 TIMES DAILY
Qty: 30 TABLET | Refills: 0 | OUTPATIENT
Start: 2021-04-14 | End: 2021-07-16

## 2021-04-14 RX ORDER — EXENATIDE 2 MG/.85ML
2 INJECTION, SUSPENSION, EXTENDED RELEASE SUBCUTANEOUS
Qty: 4 SYRINGE | Refills: 2 | Status: SHIPPED | OUTPATIENT
Start: 2021-04-14 | End: 2021-04-15 | Stop reason: CLARIF

## 2021-04-14 RX ORDER — HYDROCHLOROTHIAZIDE 25 MG/1
25 TABLET ORAL DAILY
Qty: 90 TABLET | Refills: 3 | Status: SHIPPED | OUTPATIENT
Start: 2021-04-14 | End: 2022-02-07 | Stop reason: SDUPTHER

## 2021-04-14 RX ORDER — LISINOPRIL 40 MG/1
40 TABLET ORAL DAILY
Qty: 90 TABLET | Refills: 3 | Status: SHIPPED | OUTPATIENT
Start: 2021-04-14 | End: 2022-02-07 | Stop reason: SDUPTHER

## 2021-04-15 ENCOUNTER — OFFICE VISIT (OUTPATIENT)
Dept: PODIATRY | Facility: CLINIC | Age: 61
End: 2021-04-15
Payer: MEDICAID

## 2021-04-15 ENCOUNTER — PATIENT MESSAGE (OUTPATIENT)
Dept: RESEARCH | Facility: HOSPITAL | Age: 61
End: 2021-04-15

## 2021-04-15 VITALS
HEIGHT: 66 IN | WEIGHT: 203.31 LBS | BODY MASS INDEX: 32.67 KG/M2 | SYSTOLIC BLOOD PRESSURE: 130 MMHG | HEART RATE: 64 BPM | DIASTOLIC BLOOD PRESSURE: 80 MMHG

## 2021-04-15 DIAGNOSIS — I10 ESSENTIAL HYPERTENSION: ICD-10-CM

## 2021-04-15 DIAGNOSIS — E78.5 DYSLIPIDEMIA: ICD-10-CM

## 2021-04-15 DIAGNOSIS — E11.9 TYPE 2 DIABETES MELLITUS WITHOUT COMPLICATION, WITHOUT LONG-TERM CURRENT USE OF INSULIN: ICD-10-CM

## 2021-04-15 DIAGNOSIS — E11.9 TYPE 2 DIABETES MELLITUS WITHOUT COMPLICATION, WITHOUT LONG-TERM CURRENT USE OF INSULIN: Primary | ICD-10-CM

## 2021-04-15 DIAGNOSIS — R11.0 NAUSEA: ICD-10-CM

## 2021-04-15 DIAGNOSIS — B35.1 TINEA UNGUIUM: ICD-10-CM

## 2021-04-15 PROCEDURE — 99203 OFFICE O/P NEW LOW 30 MIN: CPT | Mod: S$PBB,,, | Performed by: PODIATRIST

## 2021-04-15 PROCEDURE — 99213 OFFICE O/P EST LOW 20 MIN: CPT | Mod: PBBFAC,PN | Performed by: PODIATRIST

## 2021-04-15 PROCEDURE — 99999 PR PBB SHADOW E&M-EST. PATIENT-LVL III: CPT | Mod: PBBFAC,,, | Performed by: PODIATRIST

## 2021-04-15 PROCEDURE — 99999 PR PBB SHADOW E&M-EST. PATIENT-LVL III: ICD-10-PCS | Mod: PBBFAC,,, | Performed by: PODIATRIST

## 2021-04-15 PROCEDURE — 99203 PR OFFICE/OUTPT VISIT, NEW, LEVL III, 30-44 MIN: ICD-10-PCS | Mod: S$PBB,,, | Performed by: PODIATRIST

## 2021-04-15 RX ORDER — DULAGLUTIDE 0.75 MG/.5ML
0.75 INJECTION, SOLUTION SUBCUTANEOUS
Qty: 4 PEN | Refills: 2 | Status: SHIPPED | OUTPATIENT
Start: 2021-04-15 | End: 2021-04-16 | Stop reason: SDUPTHER

## 2021-04-16 RX ORDER — DULAGLUTIDE 0.75 MG/.5ML
0.75 INJECTION, SOLUTION SUBCUTANEOUS
Qty: 4 PEN | Refills: 2 | Status: SHIPPED | OUTPATIENT
Start: 2021-04-16 | End: 2021-07-13 | Stop reason: SDUPTHER

## 2021-04-21 DIAGNOSIS — E11.9 TYPE 2 DIABETES MELLITUS WITHOUT COMPLICATION, UNSPECIFIED WHETHER LONG TERM INSULIN USE: ICD-10-CM

## 2021-04-23 ENCOUNTER — TELEPHONE (OUTPATIENT)
Dept: PHARMACY | Facility: CLINIC | Age: 61
End: 2021-04-23

## 2021-04-28 ENCOUNTER — CLINICAL SUPPORT (OUTPATIENT)
Dept: FAMILY MEDICINE | Facility: CLINIC | Age: 61
End: 2021-04-28
Payer: MEDICAID

## 2021-04-28 VITALS — HEART RATE: 82 BPM | SYSTOLIC BLOOD PRESSURE: 126 MMHG | DIASTOLIC BLOOD PRESSURE: 84 MMHG | OXYGEN SATURATION: 98 %

## 2021-04-28 DIAGNOSIS — I10 ESSENTIAL HYPERTENSION: Primary | ICD-10-CM

## 2021-04-28 PROCEDURE — 99499 UNLISTED E&M SERVICE: CPT | Mod: S$PBB,,, | Performed by: INTERNAL MEDICINE

## 2021-04-28 PROCEDURE — 99999 PR PBB SHADOW E&M-EST. PATIENT-LVL II: ICD-10-PCS | Mod: PBBFAC,,,

## 2021-04-28 PROCEDURE — 99212 OFFICE O/P EST SF 10 MIN: CPT | Mod: PBBFAC,PO

## 2021-04-28 PROCEDURE — 99999 PR PBB SHADOW E&M-EST. PATIENT-LVL II: CPT | Mod: PBBFAC,,,

## 2021-04-28 PROCEDURE — 99499 NO LOS: ICD-10-PCS | Mod: S$PBB,,, | Performed by: INTERNAL MEDICINE

## 2021-05-10 ENCOUNTER — TELEPHONE (OUTPATIENT)
Dept: ALLERGY | Facility: CLINIC | Age: 61
End: 2021-05-10

## 2021-05-10 DIAGNOSIS — R30.0 DYSURIA: ICD-10-CM

## 2021-05-10 DIAGNOSIS — E11.9 TYPE 2 DIABETES MELLITUS WITHOUT COMPLICATION, WITHOUT LONG-TERM CURRENT USE OF INSULIN: Primary | ICD-10-CM

## 2021-05-11 ENCOUNTER — LAB VISIT (OUTPATIENT)
Dept: LAB | Facility: HOSPITAL | Age: 61
End: 2021-05-11
Attending: INTERNAL MEDICINE
Payer: MEDICAID

## 2021-05-11 DIAGNOSIS — R30.0 DYSURIA: ICD-10-CM

## 2021-05-11 PROCEDURE — 87077 CULTURE AEROBIC IDENTIFY: CPT | Performed by: INTERNAL MEDICINE

## 2021-05-11 PROCEDURE — 87086 URINE CULTURE/COLONY COUNT: CPT | Performed by: INTERNAL MEDICINE

## 2021-05-11 PROCEDURE — 81001 URINALYSIS AUTO W/SCOPE: CPT | Performed by: INTERNAL MEDICINE

## 2021-05-11 PROCEDURE — 87088 URINE BACTERIA CULTURE: CPT | Performed by: INTERNAL MEDICINE

## 2021-05-11 PROCEDURE — 87186 SC STD MICRODIL/AGAR DIL: CPT | Performed by: INTERNAL MEDICINE

## 2021-05-12 DIAGNOSIS — R39.89 SUSPECTED UTI: Primary | ICD-10-CM

## 2021-05-12 LAB
BACTERIA #/AREA URNS AUTO: ABNORMAL /HPF
BILIRUB UR QL STRIP: NEGATIVE
CLARITY UR REFRACT.AUTO: ABNORMAL
COLOR UR AUTO: YELLOW
GLUCOSE UR QL STRIP: NEGATIVE
HGB UR QL STRIP: NEGATIVE
HYALINE CASTS UR QL AUTO: 1 /LPF
KETONES UR QL STRIP: NEGATIVE
LEUKOCYTE ESTERASE UR QL STRIP: ABNORMAL
MICROSCOPIC COMMENT: ABNORMAL
NITRITE UR QL STRIP: POSITIVE
PH UR STRIP: 5 [PH] (ref 5–8)
PROT UR QL STRIP: NEGATIVE
RBC #/AREA URNS AUTO: 2 /HPF (ref 0–4)
SP GR UR STRIP: 1.02 (ref 1–1.03)
URN SPEC COLLECT METH UR: ABNORMAL
WBC #/AREA URNS AUTO: 27 /HPF (ref 0–5)

## 2021-05-12 RX ORDER — NITROFURANTOIN 25; 75 MG/1; MG/1
100 CAPSULE ORAL 2 TIMES DAILY
Qty: 10 CAPSULE | Refills: 0 | Status: SHIPPED | OUTPATIENT
Start: 2021-05-12 | End: 2021-05-17

## 2021-05-14 ENCOUNTER — PATIENT MESSAGE (OUTPATIENT)
Dept: FAMILY MEDICINE | Facility: CLINIC | Age: 61
End: 2021-05-14

## 2021-05-14 DIAGNOSIS — Z20.822 EXPOSURE TO COVID-19 VIRUS: Primary | ICD-10-CM

## 2021-05-14 LAB — BACTERIA UR CULT: ABNORMAL

## 2021-05-15 ENCOUNTER — HOSPITAL ENCOUNTER (EMERGENCY)
Facility: HOSPITAL | Age: 61
Discharge: HOME | End: 2021-05-15
Attending: EMERGENCY MEDICINE
Payer: MEDICAID

## 2021-05-15 VITALS
WEIGHT: 188 LBS | HEIGHT: 66 IN | DIASTOLIC BLOOD PRESSURE: 72 MMHG | OXYGEN SATURATION: 97 % | TEMPERATURE: 99 F | HEART RATE: 85 BPM | SYSTOLIC BLOOD PRESSURE: 126 MMHG | BODY MASS INDEX: 30.22 KG/M2 | RESPIRATION RATE: 16 BRPM

## 2021-05-15 DIAGNOSIS — B34.9 VIRAL SYNDROME: Primary | ICD-10-CM

## 2021-05-15 DIAGNOSIS — Z20.822 CLOSE EXPOSURE TO COVID-19 VIRUS: ICD-10-CM

## 2021-05-15 LAB
CTP QC/QA: YES
POCT GLUCOSE: 157 MG/DL (ref 70–110)
SARS-COV-2 RDRP RESP QL NAA+PROBE: NEGATIVE

## 2021-05-15 PROCEDURE — U0002 COVID-19 LAB TEST NON-CDC: HCPCS | Mod: ER | Performed by: EMERGENCY MEDICINE

## 2021-05-15 PROCEDURE — U0005 INFEC AGEN DETEC AMPLI PROBE: HCPCS | Performed by: EMERGENCY MEDICINE

## 2021-05-15 PROCEDURE — U0003 INFECTIOUS AGENT DETECTION BY NUCLEIC ACID (DNA OR RNA); SEVERE ACUTE RESPIRATORY SYNDROME CORONAVIRUS 2 (SARS-COV-2) (CORONAVIRUS DISEASE [COVID-19]), AMPLIFIED PROBE TECHNIQUE, MAKING USE OF HIGH THROUGHPUT TECHNOLOGIES AS DESCRIBED BY CMS-2020-01-R: HCPCS | Performed by: EMERGENCY MEDICINE

## 2021-05-15 PROCEDURE — 99284 EMERGENCY DEPT VISIT MOD MDM: CPT | Mod: 25,ER

## 2021-05-15 PROCEDURE — 63600175 PHARM REV CODE 636 W HCPCS: Mod: ER | Performed by: EMERGENCY MEDICINE

## 2021-05-15 PROCEDURE — 96372 THER/PROPH/DIAG INJ SC/IM: CPT | Mod: ER

## 2021-05-15 PROCEDURE — 82962 GLUCOSE BLOOD TEST: CPT | Mod: ER

## 2021-05-15 RX ORDER — KETOROLAC TROMETHAMINE 30 MG/ML
30 INJECTION, SOLUTION INTRAMUSCULAR; INTRAVENOUS
Status: COMPLETED | OUTPATIENT
Start: 2021-05-15 | End: 2021-05-15

## 2021-05-15 RX ORDER — MELOXICAM 15 MG/1
15 TABLET ORAL DAILY
Qty: 30 TABLET | Refills: 0 | Status: SHIPPED | OUTPATIENT
Start: 2021-05-15 | End: 2021-07-26 | Stop reason: ALTCHOICE

## 2021-05-15 RX ORDER — ONDANSETRON 4 MG/1
4 TABLET, FILM COATED ORAL EVERY 6 HOURS
Qty: 12 TABLET | Refills: 0 | Status: SHIPPED | OUTPATIENT
Start: 2021-05-15 | End: 2021-07-26 | Stop reason: ALTCHOICE

## 2021-05-15 RX ORDER — GUAIFENESIN 100 MG/5ML
100-200 SOLUTION ORAL EVERY 4 HOURS PRN
Qty: 60 ML | Refills: 0 | Status: SHIPPED | OUTPATIENT
Start: 2021-05-15 | End: 2021-05-25

## 2021-05-15 RX ORDER — FAMOTIDINE 20 MG/1
20 TABLET, FILM COATED ORAL 2 TIMES DAILY
Qty: 60 TABLET | Refills: 0 | Status: SHIPPED | OUTPATIENT
Start: 2021-05-15 | End: 2021-09-20 | Stop reason: CLARIF

## 2021-05-15 RX ORDER — CETIRIZINE HYDROCHLORIDE 10 MG/1
10 TABLET ORAL DAILY
Qty: 30 TABLET | Refills: 0 | Status: SHIPPED | OUTPATIENT
Start: 2021-05-15 | End: 2021-07-26 | Stop reason: ALTCHOICE

## 2021-05-15 RX ORDER — LOPERAMIDE HYDROCHLORIDE 2 MG/1
2 CAPSULE ORAL 4 TIMES DAILY PRN
Qty: 12 CAPSULE | Refills: 0 | Status: SHIPPED | OUTPATIENT
Start: 2021-05-15 | End: 2021-05-25

## 2021-05-15 RX ADMIN — KETOROLAC TROMETHAMINE 30 MG: 30 INJECTION, SOLUTION INTRAMUSCULAR; INTRAVENOUS at 05:05

## 2021-05-17 LAB — SARS-COV-2 RNA RESP QL NAA+PROBE: NOT DETECTED

## 2021-05-25 ENCOUNTER — PATIENT OUTREACH (OUTPATIENT)
Dept: ADMINISTRATIVE | Facility: OTHER | Age: 61
End: 2021-05-25

## 2021-05-26 ENCOUNTER — OFFICE VISIT (OUTPATIENT)
Dept: OPTOMETRY | Facility: CLINIC | Age: 61
End: 2021-05-26
Payer: MEDICAID

## 2021-05-26 DIAGNOSIS — E11.9 TYPE 2 DIABETES MELLITUS WITHOUT RETINOPATHY: Primary | ICD-10-CM

## 2021-05-26 DIAGNOSIS — H25.13 NUCLEAR SCLEROSIS, BILATERAL: ICD-10-CM

## 2021-05-26 DIAGNOSIS — H52.4 PRESBYOPIA: ICD-10-CM

## 2021-05-26 PROCEDURE — 92004 COMPRE OPH EXAM NEW PT 1/>: CPT | Mod: S$PBB,,, | Performed by: OPTOMETRIST

## 2021-05-26 PROCEDURE — 92004 PR EYE EXAM, NEW PATIENT,COMPREHESV: ICD-10-PCS | Mod: S$PBB,,, | Performed by: OPTOMETRIST

## 2021-05-26 PROCEDURE — 99213 OFFICE O/P EST LOW 20 MIN: CPT | Mod: PBBFAC,PO | Performed by: OPTOMETRIST

## 2021-05-26 PROCEDURE — 92015 DETERMINE REFRACTIVE STATE: CPT | Mod: ,,, | Performed by: OPTOMETRIST

## 2021-05-26 PROCEDURE — 92015 PR REFRACTION: ICD-10-PCS | Mod: ,,, | Performed by: OPTOMETRIST

## 2021-05-26 PROCEDURE — 99999 PR PBB SHADOW E&M-EST. PATIENT-LVL III: ICD-10-PCS | Mod: PBBFAC,,, | Performed by: OPTOMETRIST

## 2021-05-26 PROCEDURE — 99999 PR PBB SHADOW E&M-EST. PATIENT-LVL III: CPT | Mod: PBBFAC,,, | Performed by: OPTOMETRIST

## 2021-06-10 ENCOUNTER — HOSPITAL ENCOUNTER (EMERGENCY)
Facility: HOSPITAL | Age: 61
Discharge: HOME | End: 2021-06-10
Attending: EMERGENCY MEDICINE
Payer: MEDICAID

## 2021-06-10 DIAGNOSIS — T30.4 CHEMICAL BURN OF SKIN: Primary | ICD-10-CM

## 2021-06-10 PROCEDURE — 25000003 PHARM REV CODE 250: Mod: ER | Performed by: EMERGENCY MEDICINE

## 2021-06-10 PROCEDURE — 99283 EMERGENCY DEPT VISIT LOW MDM: CPT | Mod: ER

## 2021-06-10 RX ORDER — MUPIROCIN 20 MG/G
OINTMENT TOPICAL
Status: COMPLETED | OUTPATIENT
Start: 2021-06-10 | End: 2021-06-10

## 2021-06-10 RX ORDER — BACITRACIN 500 [USP'U]/G
OINTMENT TOPICAL 2 TIMES DAILY
Qty: 14 G | Refills: 0 | Status: SHIPPED | OUTPATIENT
Start: 2021-06-10 | End: 2021-06-20

## 2021-06-10 RX ADMIN — MUPIROCIN: 20 OINTMENT TOPICAL at 11:06

## 2021-06-11 VITALS
TEMPERATURE: 99 F | BODY MASS INDEX: 29.25 KG/M2 | OXYGEN SATURATION: 100 % | RESPIRATION RATE: 18 BRPM | HEIGHT: 66 IN | HEART RATE: 78 BPM | DIASTOLIC BLOOD PRESSURE: 79 MMHG | WEIGHT: 182 LBS | SYSTOLIC BLOOD PRESSURE: 126 MMHG

## 2021-06-24 ENCOUNTER — OFFICE VISIT (OUTPATIENT)
Dept: OPHTHALMOLOGY | Facility: CLINIC | Age: 61
End: 2021-06-24
Payer: MEDICAID

## 2021-06-24 DIAGNOSIS — I10 ESSENTIAL HYPERTENSION: ICD-10-CM

## 2021-06-24 DIAGNOSIS — E11.9 DM TYPE 2 WITHOUT RETINOPATHY: ICD-10-CM

## 2021-06-24 DIAGNOSIS — H26.9 CORTICAL CATARACT OF BOTH EYES: ICD-10-CM

## 2021-06-24 DIAGNOSIS — H25.13 NUCLEAR SCLEROSIS OF BOTH EYES: Primary | ICD-10-CM

## 2021-06-24 DIAGNOSIS — H52.7 REFRACTIVE ERROR: ICD-10-CM

## 2021-06-24 PROCEDURE — 99999 PR PBB SHADOW E&M-EST. PATIENT-LVL III: CPT | Mod: PBBFAC,,, | Performed by: OPHTHALMOLOGY

## 2021-06-24 PROCEDURE — 99999 PR PBB SHADOW E&M-EST. PATIENT-LVL III: ICD-10-PCS | Mod: PBBFAC,,, | Performed by: OPHTHALMOLOGY

## 2021-06-24 PROCEDURE — 92136 OPHTHALMIC BIOMETRY: CPT | Mod: PBBFAC,PO | Performed by: OPHTHALMOLOGY

## 2021-06-24 PROCEDURE — 92004 COMPRE OPH EXAM NEW PT 1/>: CPT | Mod: S$PBB,,, | Performed by: OPHTHALMOLOGY

## 2021-06-24 PROCEDURE — 92136 BIOMETRY: ICD-10-PCS | Mod: 26,S$PBB,RT, | Performed by: OPHTHALMOLOGY

## 2021-06-24 PROCEDURE — 92004 PR EYE EXAM, NEW PATIENT,COMPREHESV: ICD-10-PCS | Mod: S$PBB,,, | Performed by: OPHTHALMOLOGY

## 2021-06-24 PROCEDURE — 99213 OFFICE O/P EST LOW 20 MIN: CPT | Mod: PBBFAC,PO,25 | Performed by: OPHTHALMOLOGY

## 2021-06-24 RX ORDER — AMOXICILLIN 500 MG/1
500 CAPSULE ORAL 3 TIMES DAILY
COMMUNITY
Start: 2021-06-16 | End: 2021-06-28 | Stop reason: ALTCHOICE

## 2021-06-24 RX ORDER — DUREZOL 0.5 MG/ML
1 EMULSION OPHTHALMIC 4 TIMES DAILY
Qty: 5 ML | Refills: 1 | Status: SHIPPED | OUTPATIENT
Start: 2021-08-05 | End: 2021-09-04

## 2021-06-24 RX ORDER — CHLORHEXIDINE GLUCONATE ORAL RINSE 1.2 MG/ML
15 SOLUTION DENTAL 2 TIMES DAILY
COMMUNITY
Start: 2021-06-16 | End: 2021-10-06 | Stop reason: CLARIF

## 2021-06-24 RX ORDER — OFLOXACIN 3 MG/ML
1 SOLUTION/ DROPS OPHTHALMIC 4 TIMES DAILY
Qty: 5 ML | Refills: 1 | Status: SHIPPED | OUTPATIENT
Start: 2021-08-02 | End: 2021-08-12

## 2021-06-24 RX ORDER — NEPAFENAC 3 MG/ML
1 SUSPENSION/ DROPS OPHTHALMIC DAILY
Qty: 3 ML | Refills: 1 | Status: SHIPPED | OUTPATIENT
Start: 2021-08-02 | End: 2021-09-01

## 2021-06-28 ENCOUNTER — PATIENT OUTREACH (OUTPATIENT)
Dept: ADMINISTRATIVE | Facility: OTHER | Age: 61
End: 2021-06-28

## 2021-06-28 ENCOUNTER — OFFICE VISIT (OUTPATIENT)
Dept: PODIATRY | Facility: CLINIC | Age: 61
End: 2021-06-28
Payer: MEDICAID

## 2021-06-28 VITALS
BODY MASS INDEX: 29.25 KG/M2 | DIASTOLIC BLOOD PRESSURE: 70 MMHG | SYSTOLIC BLOOD PRESSURE: 106 MMHG | HEIGHT: 66 IN | HEART RATE: 74 BPM | WEIGHT: 182 LBS

## 2021-06-28 DIAGNOSIS — B35.1 PAIN DUE TO ONYCHOMYCOSIS OF TOENAIL: ICD-10-CM

## 2021-06-28 DIAGNOSIS — B35.1 TINEA UNGUIUM: Primary | ICD-10-CM

## 2021-06-28 DIAGNOSIS — E11.9 TYPE 2 DIABETES MELLITUS WITHOUT COMPLICATION, WITHOUT LONG-TERM CURRENT USE OF INSULIN: ICD-10-CM

## 2021-06-28 DIAGNOSIS — M79.676 PAIN DUE TO ONYCHOMYCOSIS OF TOENAIL: ICD-10-CM

## 2021-06-28 PROCEDURE — 99999 PR PBB SHADOW E&M-EST. PATIENT-LVL IV: CPT | Mod: PBBFAC,,, | Performed by: PODIATRIST

## 2021-06-28 PROCEDURE — 99213 PR OFFICE/OUTPT VISIT, EST, LEVL III, 20-29 MIN: ICD-10-PCS | Mod: S$PBB,,, | Performed by: PODIATRIST

## 2021-06-28 PROCEDURE — 99999 PR PBB SHADOW E&M-EST. PATIENT-LVL IV: ICD-10-PCS | Mod: PBBFAC,,, | Performed by: PODIATRIST

## 2021-06-28 PROCEDURE — 99214 OFFICE O/P EST MOD 30 MIN: CPT | Mod: PBBFAC,PN | Performed by: PODIATRIST

## 2021-06-28 PROCEDURE — 99213 OFFICE O/P EST LOW 20 MIN: CPT | Mod: S$PBB,,, | Performed by: PODIATRIST

## 2021-07-01 ENCOUNTER — TELEPHONE (OUTPATIENT)
Dept: OPHTHALMOLOGY | Facility: CLINIC | Age: 61
End: 2021-07-01

## 2021-07-01 DIAGNOSIS — H25.12 NS (NUCLEAR SCLEROSIS), LEFT: Primary | ICD-10-CM

## 2021-07-12 ENCOUNTER — PATIENT OUTREACH (OUTPATIENT)
Dept: ADMINISTRATIVE | Facility: HOSPITAL | Age: 61
End: 2021-07-12

## 2021-07-12 ENCOUNTER — PATIENT MESSAGE (OUTPATIENT)
Dept: FAMILY MEDICINE | Facility: CLINIC | Age: 61
End: 2021-07-12

## 2021-07-12 DIAGNOSIS — R63.4 WEIGHT LOSS: ICD-10-CM

## 2021-07-12 DIAGNOSIS — E11.9 TYPE 2 DIABETES MELLITUS WITHOUT COMPLICATION, WITHOUT LONG-TERM CURRENT USE OF INSULIN: Primary | ICD-10-CM

## 2021-07-13 DIAGNOSIS — E11.9 TYPE 2 DIABETES MELLITUS WITHOUT COMPLICATION, WITHOUT LONG-TERM CURRENT USE OF INSULIN: ICD-10-CM

## 2021-07-13 RX ORDER — DULAGLUTIDE 0.75 MG/.5ML
0.75 INJECTION, SOLUTION SUBCUTANEOUS
Qty: 4 PEN | Refills: 2 | Status: SHIPPED | OUTPATIENT
Start: 2021-07-13 | End: 2021-10-06 | Stop reason: SDUPTHER

## 2021-07-16 ENCOUNTER — HOSPITAL ENCOUNTER (EMERGENCY)
Facility: HOSPITAL | Age: 61
Discharge: HOME | End: 2021-07-16
Attending: EMERGENCY MEDICINE
Payer: MEDICAID

## 2021-07-16 VITALS
HEART RATE: 73 BPM | HEIGHT: 65 IN | DIASTOLIC BLOOD PRESSURE: 65 MMHG | TEMPERATURE: 99 F | BODY MASS INDEX: 30.32 KG/M2 | OXYGEN SATURATION: 99 % | RESPIRATION RATE: 16 BRPM | WEIGHT: 182 LBS | SYSTOLIC BLOOD PRESSURE: 136 MMHG

## 2021-07-16 DIAGNOSIS — N12 PYELONEPHRITIS: Primary | ICD-10-CM

## 2021-07-16 DIAGNOSIS — I10 HTN (HYPERTENSION): ICD-10-CM

## 2021-07-16 LAB
ALBUMIN SERPL-MCNC: 4 G/DL (ref 3.3–5.5)
ALP SERPL-CCNC: 82 U/L (ref 42–141)
BILIRUB SERPL-MCNC: 0.5 MG/DL (ref 0.2–1.6)
BILIRUBIN, POC UA: NEGATIVE
BLOOD, POC UA: ABNORMAL
BUN SERPL-MCNC: 10 MG/DL (ref 7–22)
CALCIUM SERPL-MCNC: 9.8 MG/DL (ref 8–10.3)
CHLORIDE SERPL-SCNC: 108 MMOL/L (ref 98–108)
CLARITY, POC UA: CLEAR
COLOR, POC UA: YELLOW
CREAT SERPL-MCNC: 0.8 MG/DL (ref 0.6–1.2)
GLUCOSE SERPL-MCNC: 136 MG/DL (ref 73–118)
GLUCOSE, POC UA: NEGATIVE
KETONES, POC UA: NEGATIVE
LEUKOCYTE EST, POC UA: ABNORMAL
NITRITE, POC UA: POSITIVE
PH UR STRIP: 5.5 [PH]
POC ALT (SGPT): 25 U/L (ref 10–47)
POC AST (SGOT): 25 U/L (ref 11–38)
POC TCO2: 26 MMOL/L (ref 18–33)
POTASSIUM BLD-SCNC: 3.5 MMOL/L (ref 3.6–5.1)
PROTEIN, POC UA: NEGATIVE
PROTEIN, POC: 8.2 G/DL (ref 6.4–8.1)
SODIUM BLD-SCNC: 141 MMOL/L (ref 128–145)
SPECIFIC GRAVITY, POC UA: 1.02
UROBILINOGEN, POC UA: 0.2 E.U./DL

## 2021-07-16 PROCEDURE — 87088 URINE BACTERIA CULTURE: CPT | Performed by: EMERGENCY MEDICINE

## 2021-07-16 PROCEDURE — 96361 HYDRATE IV INFUSION ADD-ON: CPT | Mod: ER

## 2021-07-16 PROCEDURE — 81003 URINALYSIS AUTO W/O SCOPE: CPT | Mod: ER

## 2021-07-16 PROCEDURE — 93010 ELECTROCARDIOGRAM REPORT: CPT | Mod: ,,, | Performed by: INTERNAL MEDICINE

## 2021-07-16 PROCEDURE — 93005 ELECTROCARDIOGRAM TRACING: CPT | Mod: ER

## 2021-07-16 PROCEDURE — 87186 SC STD MICRODIL/AGAR DIL: CPT | Performed by: EMERGENCY MEDICINE

## 2021-07-16 PROCEDURE — 99285 EMERGENCY DEPT VISIT HI MDM: CPT | Mod: 25,ER

## 2021-07-16 PROCEDURE — 63600175 PHARM REV CODE 636 W HCPCS: Mod: ER | Performed by: EMERGENCY MEDICINE

## 2021-07-16 PROCEDURE — 80053 COMPREHEN METABOLIC PANEL: CPT | Mod: ER

## 2021-07-16 PROCEDURE — 96365 THER/PROPH/DIAG IV INF INIT: CPT | Mod: ER

## 2021-07-16 PROCEDURE — 25000003 PHARM REV CODE 250: Mod: ER | Performed by: EMERGENCY MEDICINE

## 2021-07-16 PROCEDURE — 96375 TX/PRO/DX INJ NEW DRUG ADDON: CPT | Mod: ER

## 2021-07-16 PROCEDURE — 87077 CULTURE AEROBIC IDENTIFY: CPT | Performed by: EMERGENCY MEDICINE

## 2021-07-16 PROCEDURE — 93010 EKG 12-LEAD: ICD-10-PCS | Mod: ,,, | Performed by: INTERNAL MEDICINE

## 2021-07-16 PROCEDURE — 87086 URINE CULTURE/COLONY COUNT: CPT | Performed by: EMERGENCY MEDICINE

## 2021-07-16 PROCEDURE — 85025 COMPLETE CBC W/AUTO DIFF WBC: CPT | Mod: ER

## 2021-07-16 RX ORDER — ONDANSETRON 8 MG/1
8 TABLET, ORALLY DISINTEGRATING ORAL EVERY 6 HOURS PRN
Qty: 30 TABLET | Refills: 0 | Status: SHIPPED | OUTPATIENT
Start: 2021-07-16 | End: 2021-07-18

## 2021-07-16 RX ORDER — ACETAMINOPHEN 500 MG
1000 TABLET ORAL
Status: COMPLETED | OUTPATIENT
Start: 2021-07-16 | End: 2021-07-16

## 2021-07-16 RX ORDER — ACETAMINOPHEN 500 MG
1000 TABLET ORAL EVERY 6 HOURS PRN
Qty: 30 TABLET | Refills: 0 | Status: SHIPPED | OUTPATIENT
Start: 2021-07-16 | End: 2021-07-26 | Stop reason: ALTCHOICE

## 2021-07-16 RX ORDER — FAMOTIDINE 10 MG/ML
20 INJECTION INTRAVENOUS
Status: COMPLETED | OUTPATIENT
Start: 2021-07-16 | End: 2021-07-16

## 2021-07-16 RX ORDER — IBUPROFEN 600 MG/1
600 TABLET ORAL EVERY 6 HOURS PRN
Qty: 20 TABLET | Refills: 0 | Status: SHIPPED | OUTPATIENT
Start: 2021-07-16 | End: 2021-09-20 | Stop reason: CLARIF

## 2021-07-16 RX ORDER — LEVOFLOXACIN 750 MG/1
750 TABLET ORAL DAILY
Qty: 5 TABLET | Refills: 0 | Status: SHIPPED | OUTPATIENT
Start: 2021-07-16 | End: 2021-07-21

## 2021-07-16 RX ORDER — POTASSIUM CHLORIDE 20 MEQ/1
40 TABLET, EXTENDED RELEASE ORAL
Status: COMPLETED | OUTPATIENT
Start: 2021-07-16 | End: 2021-07-16

## 2021-07-16 RX ORDER — KETOROLAC TROMETHAMINE 30 MG/ML
15 INJECTION, SOLUTION INTRAMUSCULAR; INTRAVENOUS
Status: COMPLETED | OUTPATIENT
Start: 2021-07-16 | End: 2021-07-16

## 2021-07-16 RX ADMIN — POTASSIUM CHLORIDE 40 MEQ: 1500 TABLET, EXTENDED RELEASE ORAL at 10:07

## 2021-07-16 RX ADMIN — ACETAMINOPHEN 1000 MG: 500 TABLET ORAL at 10:07

## 2021-07-16 RX ADMIN — CEFTRIAXONE 1 G: 1 INJECTION, SOLUTION INTRAVENOUS at 09:07

## 2021-07-16 RX ADMIN — SODIUM CHLORIDE 1000 ML: 0.9 INJECTION, SOLUTION INTRAVENOUS at 08:07

## 2021-07-16 RX ADMIN — FAMOTIDINE 20 MG: 10 INJECTION INTRAVENOUS at 08:07

## 2021-07-16 RX ADMIN — KETOROLAC TROMETHAMINE 15 MG: 30 INJECTION, SOLUTION INTRAMUSCULAR; INTRAVENOUS at 08:07

## 2021-07-18 ENCOUNTER — PATIENT MESSAGE (OUTPATIENT)
Dept: FAMILY MEDICINE | Facility: CLINIC | Age: 61
End: 2021-07-18

## 2021-07-18 LAB — BACTERIA UR CULT: ABNORMAL

## 2021-07-22 ENCOUNTER — HOSPITAL ENCOUNTER (OUTPATIENT)
Dept: RADIOLOGY | Facility: HOSPITAL | Age: 61
Discharge: HOME | End: 2021-07-22
Attending: INTERNAL MEDICINE
Payer: MEDICAID

## 2021-07-22 DIAGNOSIS — R63.4 WEIGHT LOSS: ICD-10-CM

## 2021-07-22 PROCEDURE — 71046 X-RAY EXAM CHEST 2 VIEWS: CPT | Mod: 26,,, | Performed by: RADIOLOGY

## 2021-07-22 PROCEDURE — 71046 X-RAY EXAM CHEST 2 VIEWS: CPT | Mod: TC,FY,PO

## 2021-07-22 PROCEDURE — 71046 XR CHEST PA AND LATERAL: ICD-10-PCS | Mod: 26,,, | Performed by: RADIOLOGY

## 2021-07-26 ENCOUNTER — OFFICE VISIT (OUTPATIENT)
Dept: FAMILY MEDICINE | Facility: CLINIC | Age: 61
End: 2021-07-26
Payer: MEDICAID

## 2021-07-26 ENCOUNTER — PATIENT MESSAGE (OUTPATIENT)
Dept: ADMINISTRATIVE | Facility: OTHER | Age: 61
End: 2021-07-26

## 2021-07-26 VITALS
HEIGHT: 65 IN | OXYGEN SATURATION: 97 % | DIASTOLIC BLOOD PRESSURE: 82 MMHG | HEART RATE: 77 BPM | BODY MASS INDEX: 29.61 KG/M2 | WEIGHT: 177.69 LBS | SYSTOLIC BLOOD PRESSURE: 128 MMHG | TEMPERATURE: 98 F

## 2021-07-26 DIAGNOSIS — E78.5 DYSLIPIDEMIA: ICD-10-CM

## 2021-07-26 DIAGNOSIS — Z12.4 ENCOUNTER FOR SCREENING FOR CERVICAL CANCER: ICD-10-CM

## 2021-07-26 DIAGNOSIS — I10 ESSENTIAL HYPERTENSION: ICD-10-CM

## 2021-07-26 DIAGNOSIS — E11.9 TYPE 2 DIABETES MELLITUS WITHOUT COMPLICATION, WITHOUT LONG-TERM CURRENT USE OF INSULIN: Primary | ICD-10-CM

## 2021-07-26 DIAGNOSIS — R63.4 WEIGHT LOSS: ICD-10-CM

## 2021-07-26 PROCEDURE — 99214 OFFICE O/P EST MOD 30 MIN: CPT | Mod: PBBFAC,PO | Performed by: INTERNAL MEDICINE

## 2021-07-26 PROCEDURE — 99214 PR OFFICE/OUTPT VISIT, EST, LEVL IV, 30-39 MIN: ICD-10-PCS | Mod: S$PBB,,, | Performed by: INTERNAL MEDICINE

## 2021-07-26 PROCEDURE — 99999 PR PBB SHADOW E&M-EST. PATIENT-LVL IV: CPT | Mod: PBBFAC,,, | Performed by: INTERNAL MEDICINE

## 2021-07-26 PROCEDURE — 99214 OFFICE O/P EST MOD 30 MIN: CPT | Mod: S$PBB,,, | Performed by: INTERNAL MEDICINE

## 2021-07-26 PROCEDURE — 99999 PR PBB SHADOW E&M-EST. PATIENT-LVL IV: ICD-10-PCS | Mod: PBBFAC,,, | Performed by: INTERNAL MEDICINE

## 2021-07-27 ENCOUNTER — TELEPHONE (OUTPATIENT)
Dept: OPHTHALMOLOGY | Facility: CLINIC | Age: 61
End: 2021-07-27

## 2021-07-27 DIAGNOSIS — H25.11 NS (NUCLEAR SCLEROSIS), RIGHT: Primary | ICD-10-CM

## 2021-07-29 ENCOUNTER — TELEPHONE (OUTPATIENT)
Dept: OPHTHALMOLOGY | Facility: CLINIC | Age: 61
End: 2021-07-29

## 2021-08-16 ENCOUNTER — OFFICE VISIT (OUTPATIENT)
Dept: OBSTETRICS AND GYNECOLOGY | Facility: CLINIC | Age: 61
End: 2021-08-16
Payer: MEDICAID

## 2021-08-16 VITALS — BODY MASS INDEX: 28.96 KG/M2 | WEIGHT: 174 LBS

## 2021-08-16 DIAGNOSIS — Z12.4 ENCOUNTER FOR SCREENING FOR CERVICAL CANCER: ICD-10-CM

## 2021-08-16 PROCEDURE — 99212 OFFICE O/P EST SF 10 MIN: CPT | Mod: PBBFAC | Performed by: OBSTETRICS & GYNECOLOGY

## 2021-08-16 PROCEDURE — 99386 PR PREVENTIVE VISIT,NEW,40-64: ICD-10-PCS | Mod: S$PBB,,, | Performed by: OBSTETRICS & GYNECOLOGY

## 2021-08-16 PROCEDURE — 99386 PREV VISIT NEW AGE 40-64: CPT | Mod: S$PBB,,, | Performed by: OBSTETRICS & GYNECOLOGY

## 2021-08-16 PROCEDURE — 99999 PR PBB SHADOW E&M-EST. PATIENT-LVL II: CPT | Mod: PBBFAC,,, | Performed by: OBSTETRICS & GYNECOLOGY

## 2021-08-16 PROCEDURE — 99999 PR PBB SHADOW E&M-EST. PATIENT-LVL II: ICD-10-PCS | Mod: PBBFAC,,, | Performed by: OBSTETRICS & GYNECOLOGY

## 2021-08-18 ENCOUNTER — TELEPHONE (OUTPATIENT)
Dept: OPHTHALMOLOGY | Facility: CLINIC | Age: 61
End: 2021-08-18

## 2021-09-15 ENCOUNTER — TELEPHONE (OUTPATIENT)
Dept: OPHTHALMOLOGY | Facility: CLINIC | Age: 61
End: 2021-09-15

## 2021-09-15 DIAGNOSIS — H25.11 NUCLEAR SCLEROTIC CATARACT OF RIGHT EYE: Primary | ICD-10-CM

## 2021-09-22 ENCOUNTER — ANESTHESIA EVENT (OUTPATIENT)
Dept: SURGERY | Facility: HOSPITAL | Age: 61
End: 2021-09-22
Payer: MEDICAID

## 2021-09-22 ENCOUNTER — HOSPITAL ENCOUNTER (OUTPATIENT)
Dept: PREADMISSION TESTING | Facility: HOSPITAL | Age: 61
Discharge: HOME | End: 2021-09-22
Attending: OPHTHALMOLOGY
Payer: MEDICAID

## 2021-09-22 DIAGNOSIS — Z11.52 ENCOUNTER FOR PREOPERATIVE SCREENING LABORATORY TESTING FOR COVID-19 VIRUS: ICD-10-CM

## 2021-09-22 DIAGNOSIS — Z01.812 ENCOUNTER FOR PREOPERATIVE SCREENING LABORATORY TESTING FOR COVID-19 VIRUS: ICD-10-CM

## 2021-09-22 LAB — SARS-COV-2 RDRP RESP QL NAA+PROBE: NEGATIVE

## 2021-09-22 PROCEDURE — U0002 COVID-19 LAB TEST NON-CDC: HCPCS | Performed by: OPHTHALMOLOGY

## 2021-09-23 ENCOUNTER — HOSPITAL ENCOUNTER (OUTPATIENT)
Facility: HOSPITAL | Age: 61
Discharge: HOME | End: 2021-09-23
Attending: OPHTHALMOLOGY | Admitting: OPHTHALMOLOGY
Payer: MEDICAID

## 2021-09-23 ENCOUNTER — ANESTHESIA (OUTPATIENT)
Dept: SURGERY | Facility: HOSPITAL | Age: 61
End: 2021-09-23
Payer: MEDICAID

## 2021-09-23 VITALS
SYSTOLIC BLOOD PRESSURE: 142 MMHG | HEART RATE: 58 BPM | WEIGHT: 170 LBS | DIASTOLIC BLOOD PRESSURE: 72 MMHG | BODY MASS INDEX: 27.32 KG/M2 | RESPIRATION RATE: 18 BRPM | OXYGEN SATURATION: 100 % | HEIGHT: 66 IN | TEMPERATURE: 98 F

## 2021-09-23 DIAGNOSIS — Z01.812 ENCOUNTER FOR PREOPERATIVE SCREENING LABORATORY TESTING FOR COVID-19 VIRUS: ICD-10-CM

## 2021-09-23 DIAGNOSIS — H25.11 NUCLEAR SCLEROTIC CATARACT OF RIGHT EYE: Primary | ICD-10-CM

## 2021-09-23 DIAGNOSIS — Z11.52 ENCOUNTER FOR PREOPERATIVE SCREENING LABORATORY TESTING FOR COVID-19 VIRUS: ICD-10-CM

## 2021-09-23 LAB — POCT GLUCOSE: 93 MG/DL (ref 70–110)

## 2021-09-23 PROCEDURE — 25000003 PHARM REV CODE 250: Performed by: OPHTHALMOLOGY

## 2021-09-23 PROCEDURE — D9220A PRA ANESTHESIA: ICD-10-PCS | Mod: ANES,,, | Performed by: ANESTHESIOLOGY

## 2021-09-23 PROCEDURE — 00142 ANES PX ON EYE LENS SURGERY: CPT | Performed by: OPHTHALMOLOGY

## 2021-09-23 PROCEDURE — 63600175 PHARM REV CODE 636 W HCPCS: Performed by: OPHTHALMOLOGY

## 2021-09-23 PROCEDURE — 63600175 PHARM REV CODE 636 W HCPCS: Performed by: REGISTERED NURSE

## 2021-09-23 PROCEDURE — 37000009 HC ANESTHESIA EA ADD 15 MINS: Performed by: OPHTHALMOLOGY

## 2021-09-23 PROCEDURE — D9220A PRA ANESTHESIA: Mod: CRNA,,, | Performed by: REGISTERED NURSE

## 2021-09-23 PROCEDURE — 71000015 HC POSTOP RECOV 1ST HR: Performed by: OPHTHALMOLOGY

## 2021-09-23 PROCEDURE — 66984 PR REMOVAL, CATARACT, W/INSRT INTRAOC LENS, W/O ENDO CYCLO: ICD-10-PCS | Mod: RT,,, | Performed by: OPHTHALMOLOGY

## 2021-09-23 PROCEDURE — D9220A PRA ANESTHESIA: Mod: ANES,,, | Performed by: ANESTHESIOLOGY

## 2021-09-23 PROCEDURE — 37000008 HC ANESTHESIA 1ST 15 MINUTES: Performed by: OPHTHALMOLOGY

## 2021-09-23 PROCEDURE — D9220A PRA ANESTHESIA: ICD-10-PCS | Mod: CRNA,,, | Performed by: REGISTERED NURSE

## 2021-09-23 PROCEDURE — V2632 POST CHMBR INTRAOCULAR LENS: HCPCS | Performed by: OPHTHALMOLOGY

## 2021-09-23 PROCEDURE — 36000707: Performed by: OPHTHALMOLOGY

## 2021-09-23 PROCEDURE — 25000003 PHARM REV CODE 250

## 2021-09-23 PROCEDURE — 82962 GLUCOSE BLOOD TEST: CPT | Performed by: OPHTHALMOLOGY

## 2021-09-23 PROCEDURE — 66984 XCAPSL CTRC RMVL W/O ECP: CPT | Mod: RT,,, | Performed by: OPHTHALMOLOGY

## 2021-09-23 PROCEDURE — 36000706: Performed by: OPHTHALMOLOGY

## 2021-09-23 PROCEDURE — 71000016 HC POSTOP RECOV ADDL HR: Performed by: OPHTHALMOLOGY

## 2021-09-23 PROCEDURE — A4217 STERILE WATER/SALINE, 500 ML: HCPCS | Performed by: OPHTHALMOLOGY

## 2021-09-23 DEVICE — LENS 23.0: Type: IMPLANTABLE DEVICE | Site: EYE | Status: FUNCTIONAL

## 2021-09-23 RX ORDER — PHENYLEPHRINE HYDROCHLORIDE 25 MG/ML
1 SOLUTION/ DROPS OPHTHALMIC
Status: COMPLETED | OUTPATIENT
Start: 2021-09-23 | End: 2021-09-23

## 2021-09-23 RX ORDER — HYDROCODONE BITARTRATE AND ACETAMINOPHEN 5; 325 MG/1; MG/1
1 TABLET ORAL EVERY 4 HOURS PRN
Status: DISCONTINUED | OUTPATIENT
Start: 2021-09-23 | End: 2021-09-23 | Stop reason: HOSPADM

## 2021-09-23 RX ORDER — PREDNISOLONE ACETATE 10 MG/ML
SUSPENSION/ DROPS OPHTHALMIC
Status: DISCONTINUED | OUTPATIENT
Start: 2021-09-23 | End: 2021-09-23 | Stop reason: HOSPADM

## 2021-09-23 RX ORDER — WATER 1 ML/ML
IRRIGANT IRRIGATION
Status: DISCONTINUED | OUTPATIENT
Start: 2021-09-23 | End: 2021-09-23 | Stop reason: HOSPADM

## 2021-09-23 RX ORDER — CYCLOPENTOLATE HYDROCHLORIDE 10 MG/ML
1 SOLUTION/ DROPS OPHTHALMIC
Status: DISCONTINUED | OUTPATIENT
Start: 2021-09-23 | End: 2021-09-23 | Stop reason: HOSPADM

## 2021-09-23 RX ORDER — OFLOXACIN 3 MG/ML
SOLUTION/ DROPS OPHTHALMIC
Status: DISCONTINUED | OUTPATIENT
Start: 2021-09-23 | End: 2021-09-23 | Stop reason: HOSPADM

## 2021-09-23 RX ORDER — LIDOCAINE HYDROCHLORIDE 10 MG/ML
1 INJECTION, SOLUTION EPIDURAL; INFILTRATION; INTRACAUDAL; PERINEURAL ONCE
Status: DISCONTINUED | OUTPATIENT
Start: 2021-09-23 | End: 2021-09-23 | Stop reason: HOSPADM

## 2021-09-23 RX ORDER — SODIUM CHLORIDE 9 MG/ML
INJECTION, SOLUTION INTRAVENOUS CONTINUOUS
Status: DISCONTINUED | OUTPATIENT
Start: 2021-09-23 | End: 2021-09-23 | Stop reason: HOSPADM

## 2021-09-23 RX ORDER — FENTANYL CITRATE 50 UG/ML
INJECTION, SOLUTION INTRAMUSCULAR; INTRAVENOUS
Status: DISCONTINUED | OUTPATIENT
Start: 2021-09-23 | End: 2021-09-23

## 2021-09-23 RX ORDER — SODIUM CHLORIDE, SODIUM LACTATE, POTASSIUM CHLORIDE, CALCIUM CHLORIDE 600; 310; 30; 20 MG/100ML; MG/100ML; MG/100ML; MG/100ML
INJECTION, SOLUTION INTRAVENOUS CONTINUOUS PRN
Status: DISCONTINUED | OUTPATIENT
Start: 2021-09-23 | End: 2021-09-23

## 2021-09-23 RX ORDER — SODIUM CHLORIDE 0.9 % (FLUSH) 0.9 %
10 SYRINGE (ML) INJECTION
Status: DISCONTINUED | OUTPATIENT
Start: 2021-09-23 | End: 2021-09-23 | Stop reason: HOSPADM

## 2021-09-23 RX ORDER — TETRACAINE HYDROCHLORIDE 5 MG/ML
1 SOLUTION OPHTHALMIC
Status: COMPLETED | OUTPATIENT
Start: 2021-09-23 | End: 2021-09-23

## 2021-09-23 RX ORDER — TROPICAMIDE 10 MG/ML
1 SOLUTION/ DROPS OPHTHALMIC
Status: COMPLETED | OUTPATIENT
Start: 2021-09-23 | End: 2021-09-23

## 2021-09-23 RX ORDER — LIDOCAINE HYDROCHLORIDE 20 MG/ML
INJECTION, SOLUTION INFILTRATION; PERINEURAL
Status: DISCONTINUED | OUTPATIENT
Start: 2021-09-23 | End: 2021-09-23 | Stop reason: HOSPADM

## 2021-09-23 RX ORDER — POVIDONE-IODINE 5 %
SOLUTION, NON-ORAL OPHTHALMIC (EYE)
Status: DISCONTINUED | OUTPATIENT
Start: 2021-09-23 | End: 2021-09-23 | Stop reason: HOSPADM

## 2021-09-23 RX ORDER — OFLOXACIN 3 MG/ML
1 SOLUTION/ DROPS OPHTHALMIC
Status: DISCONTINUED | OUTPATIENT
Start: 2021-09-23 | End: 2021-09-23 | Stop reason: HOSPADM

## 2021-09-23 RX ORDER — ACETAMINOPHEN 325 MG/1
650 TABLET ORAL EVERY 4 HOURS PRN
Status: DISCONTINUED | OUTPATIENT
Start: 2021-09-23 | End: 2021-09-23 | Stop reason: HOSPADM

## 2021-09-23 RX ADMIN — TETRACAINE HYDROCHLORIDE 1 DROP: 5 SOLUTION OPHTHALMIC at 06:09

## 2021-09-23 RX ADMIN — TROPICAMIDE 1 DROP: 10 SOLUTION/ DROPS OPHTHALMIC at 06:09

## 2021-09-23 RX ADMIN — CYCLOPENTOLATE HYDROCHLORIDE 1 DROP: 10 SOLUTION/ DROPS OPHTHALMIC at 06:09

## 2021-09-23 RX ADMIN — FENTANYL CITRATE 50 MCG: 50 INJECTION, SOLUTION INTRAMUSCULAR; INTRAVENOUS at 07:09

## 2021-09-23 RX ADMIN — OFLOXACIN 1 DROP: 3 SOLUTION OPHTHALMIC at 06:09

## 2021-09-23 RX ADMIN — PHENYLEPHRINE HYDROCHLORIDE 1 DROP: 25 SOLUTION/ DROPS OPHTHALMIC at 06:09

## 2021-09-23 RX ADMIN — SODIUM CHLORIDE, SODIUM LACTATE, POTASSIUM CHLORIDE, AND CALCIUM CHLORIDE: .6; .31; .03; .02 INJECTION, SOLUTION INTRAVENOUS at 07:09

## 2021-09-24 ENCOUNTER — OFFICE VISIT (OUTPATIENT)
Dept: OPHTHALMOLOGY | Facility: CLINIC | Age: 61
End: 2021-09-24
Payer: MEDICAID

## 2021-09-24 VITALS — DIASTOLIC BLOOD PRESSURE: 77 MMHG | SYSTOLIC BLOOD PRESSURE: 124 MMHG | HEART RATE: 66 BPM

## 2021-09-24 DIAGNOSIS — Z98.890 POST-OPERATIVE STATE: Primary | ICD-10-CM

## 2021-09-24 PROCEDURE — 99024 PR POST-OP FOLLOW-UP VISIT: ICD-10-PCS | Mod: ,,, | Performed by: OPHTHALMOLOGY

## 2021-09-24 PROCEDURE — 99999 PR PBB SHADOW E&M-EST. PATIENT-LVL II: CPT | Mod: PBBFAC,,, | Performed by: OPHTHALMOLOGY

## 2021-09-24 PROCEDURE — 99999 PR PBB SHADOW E&M-EST. PATIENT-LVL II: ICD-10-PCS | Mod: PBBFAC,,, | Performed by: OPHTHALMOLOGY

## 2021-09-24 PROCEDURE — 99212 OFFICE O/P EST SF 10 MIN: CPT | Mod: PBBFAC,PO | Performed by: OPHTHALMOLOGY

## 2021-09-24 PROCEDURE — 99024 POSTOP FOLLOW-UP VISIT: CPT | Mod: ,,, | Performed by: OPHTHALMOLOGY

## 2021-09-28 ENCOUNTER — TELEPHONE (OUTPATIENT)
Dept: OPHTHALMOLOGY | Facility: CLINIC | Age: 61
End: 2021-09-28

## 2021-09-28 DIAGNOSIS — H25.12 NS (NUCLEAR SCLEROSIS), LEFT: Primary | ICD-10-CM

## 2021-09-30 ENCOUNTER — TELEPHONE (OUTPATIENT)
Dept: OPHTHALMOLOGY | Facility: CLINIC | Age: 61
End: 2021-09-30

## 2021-10-01 ENCOUNTER — OFFICE VISIT (OUTPATIENT)
Dept: OPHTHALMOLOGY | Facility: CLINIC | Age: 61
End: 2021-10-01
Payer: MEDICAID

## 2021-10-01 DIAGNOSIS — H25.12 NS (NUCLEAR SCLEROSIS), LEFT: ICD-10-CM

## 2021-10-01 DIAGNOSIS — Z98.890 POST-OPERATIVE STATE: Primary | ICD-10-CM

## 2021-10-01 PROCEDURE — 92136 PR OPHTHAL BIOMETRY,INTRAOC LENS POW CALC: ICD-10-PCS | Mod: 26,S$PBB,LT, | Performed by: OPHTHALMOLOGY

## 2021-10-01 PROCEDURE — 99212 OFFICE O/P EST SF 10 MIN: CPT | Mod: PBBFAC,PO | Performed by: OPHTHALMOLOGY

## 2021-10-01 PROCEDURE — 92136 OPHTHALMIC BIOMETRY: CPT | Mod: PBBFAC,PO | Performed by: OPHTHALMOLOGY

## 2021-10-01 PROCEDURE — 92136 OPHTHALMIC BIOMETRY: CPT | Mod: 26,S$PBB,LT, | Performed by: OPHTHALMOLOGY

## 2021-10-01 PROCEDURE — 99024 PR POST-OP FOLLOW-UP VISIT: ICD-10-PCS | Mod: ,,, | Performed by: OPHTHALMOLOGY

## 2021-10-01 PROCEDURE — 99024 POSTOP FOLLOW-UP VISIT: CPT | Mod: ,,, | Performed by: OPHTHALMOLOGY

## 2021-10-01 PROCEDURE — 99999 PR PBB SHADOW E&M-EST. PATIENT-LVL II: CPT | Mod: PBBFAC,,, | Performed by: OPHTHALMOLOGY

## 2021-10-01 PROCEDURE — 99999 PR PBB SHADOW E&M-EST. PATIENT-LVL II: ICD-10-PCS | Mod: PBBFAC,,, | Performed by: OPHTHALMOLOGY

## 2021-10-01 RX ORDER — NEPAFENAC 3 MG/ML
1 SUSPENSION/ DROPS OPHTHALMIC DAILY
Qty: 3 ML | Refills: 1 | Status: SHIPPED | OUTPATIENT
Start: 2021-10-01 | End: 2021-10-31

## 2021-10-04 ENCOUNTER — OFFICE VISIT (OUTPATIENT)
Dept: PODIATRY | Facility: CLINIC | Age: 61
End: 2021-10-04
Payer: MEDICAID

## 2021-10-04 VITALS
BODY MASS INDEX: 27.32 KG/M2 | DIASTOLIC BLOOD PRESSURE: 70 MMHG | HEART RATE: 70 BPM | HEIGHT: 66 IN | SYSTOLIC BLOOD PRESSURE: 102 MMHG | WEIGHT: 170 LBS

## 2021-10-04 DIAGNOSIS — G57.82 SURAL NEURITIS, LEFT: Primary | ICD-10-CM

## 2021-10-04 DIAGNOSIS — E11.9 TYPE 2 DIABETES MELLITUS WITHOUT COMPLICATION, WITHOUT LONG-TERM CURRENT USE OF INSULIN: ICD-10-CM

## 2021-10-04 DIAGNOSIS — G57.32 ENTRAPMENT NEUROPATHY OF LEFT SUPERFICIAL PERONEAL NERVE: ICD-10-CM

## 2021-10-04 PROCEDURE — 99999 PR PBB SHADOW E&M-EST. PATIENT-LVL III: CPT | Mod: PBBFAC,,, | Performed by: PODIATRIST

## 2021-10-04 PROCEDURE — 99213 OFFICE O/P EST LOW 20 MIN: CPT | Mod: S$PBB,,, | Performed by: PODIATRIST

## 2021-10-04 PROCEDURE — 99999 PR PBB SHADOW E&M-EST. PATIENT-LVL III: ICD-10-PCS | Mod: PBBFAC,,, | Performed by: PODIATRIST

## 2021-10-04 PROCEDURE — 99213 OFFICE O/P EST LOW 20 MIN: CPT | Mod: PBBFAC,PN | Performed by: PODIATRIST

## 2021-10-04 PROCEDURE — 99213 PR OFFICE/OUTPT VISIT, EST, LEVL III, 20-29 MIN: ICD-10-PCS | Mod: S$PBB,,, | Performed by: PODIATRIST

## 2021-10-04 RX ORDER — METHOCARBAMOL 500 MG/1
500 TABLET, FILM COATED ORAL 3 TIMES DAILY
Qty: 45 TABLET | Refills: 0 | Status: SHIPPED | OUTPATIENT
Start: 2021-10-04 | End: 2021-10-19

## 2021-10-04 RX ORDER — GABAPENTIN 100 MG/1
100 CAPSULE ORAL 3 TIMES DAILY
Qty: 45 CAPSULE | Refills: 0 | Status: SHIPPED | OUTPATIENT
Start: 2021-10-04 | End: 2022-01-27

## 2021-10-06 ENCOUNTER — HOSPITAL ENCOUNTER (OUTPATIENT)
Dept: PREADMISSION TESTING | Facility: HOSPITAL | Age: 61
Discharge: HOME | End: 2021-10-06
Attending: OPHTHALMOLOGY
Payer: MEDICAID

## 2021-10-06 DIAGNOSIS — E11.9 TYPE 2 DIABETES MELLITUS WITHOUT COMPLICATION, WITHOUT LONG-TERM CURRENT USE OF INSULIN: ICD-10-CM

## 2021-10-06 DIAGNOSIS — Z01.812 ENCOUNTER FOR PREOPERATIVE SCREENING LABORATORY TESTING FOR COVID-19 VIRUS: Primary | ICD-10-CM

## 2021-10-06 DIAGNOSIS — Z11.52 ENCOUNTER FOR PREOPERATIVE SCREENING LABORATORY TESTING FOR COVID-19 VIRUS: Primary | ICD-10-CM

## 2021-10-06 RX ORDER — IBUPROFEN 200 MG
200 TABLET ORAL EVERY 6 HOURS PRN
COMMUNITY
End: 2022-04-24

## 2021-10-06 RX ORDER — DULAGLUTIDE 0.75 MG/.5ML
0.75 INJECTION, SOLUTION SUBCUTANEOUS
Qty: 4 PEN | Refills: 2 | Status: SHIPPED | OUTPATIENT
Start: 2021-10-06 | End: 2021-12-30 | Stop reason: SDUPTHER

## 2021-10-13 ENCOUNTER — HOSPITAL ENCOUNTER (OUTPATIENT)
Dept: PREADMISSION TESTING | Facility: HOSPITAL | Age: 61
Discharge: HOME | End: 2021-10-13
Attending: OPHTHALMOLOGY
Payer: MEDICAID

## 2021-10-13 ENCOUNTER — ANESTHESIA EVENT (OUTPATIENT)
Dept: SURGERY | Facility: HOSPITAL | Age: 61
End: 2021-10-13
Payer: MEDICAID

## 2021-10-13 DIAGNOSIS — Z01.812 ENCOUNTER FOR PREOPERATIVE SCREENING LABORATORY TESTING FOR COVID-19 VIRUS: ICD-10-CM

## 2021-10-13 DIAGNOSIS — Z11.52 ENCOUNTER FOR PREOPERATIVE SCREENING LABORATORY TESTING FOR COVID-19 VIRUS: ICD-10-CM

## 2021-10-13 LAB — SARS-COV-2 RDRP RESP QL NAA+PROBE: NEGATIVE

## 2021-10-13 PROCEDURE — U0002 COVID-19 LAB TEST NON-CDC: HCPCS | Performed by: OPHTHALMOLOGY

## 2021-10-14 ENCOUNTER — ANESTHESIA (OUTPATIENT)
Dept: SURGERY | Facility: HOSPITAL | Age: 61
End: 2021-10-14
Payer: MEDICAID

## 2021-10-14 ENCOUNTER — HOSPITAL ENCOUNTER (OUTPATIENT)
Facility: HOSPITAL | Age: 61
Discharge: HOME | End: 2021-10-14
Attending: OPHTHALMOLOGY | Admitting: OPHTHALMOLOGY
Payer: MEDICAID

## 2021-10-14 VITALS
OXYGEN SATURATION: 100 % | DIASTOLIC BLOOD PRESSURE: 80 MMHG | HEIGHT: 66 IN | BODY MASS INDEX: 27.32 KG/M2 | HEART RATE: 58 BPM | RESPIRATION RATE: 18 BRPM | TEMPERATURE: 98 F | SYSTOLIC BLOOD PRESSURE: 144 MMHG | WEIGHT: 170 LBS

## 2021-10-14 DIAGNOSIS — Z01.812 ENCOUNTER FOR PREOPERATIVE SCREENING LABORATORY TESTING FOR COVID-19 VIRUS: ICD-10-CM

## 2021-10-14 DIAGNOSIS — Z11.52 ENCOUNTER FOR PREOPERATIVE SCREENING LABORATORY TESTING FOR COVID-19 VIRUS: ICD-10-CM

## 2021-10-14 DIAGNOSIS — H25.12 NUCLEAR SCLEROTIC CATARACT OF LEFT EYE: Primary | ICD-10-CM

## 2021-10-14 LAB — POCT GLUCOSE: 91 MG/DL (ref 70–110)

## 2021-10-14 PROCEDURE — 00142 ANES PX ON EYE LENS SURGERY: CPT | Performed by: OPHTHALMOLOGY

## 2021-10-14 PROCEDURE — 71000016 HC POSTOP RECOV ADDL HR: Performed by: OPHTHALMOLOGY

## 2021-10-14 PROCEDURE — 36000706: Performed by: OPHTHALMOLOGY

## 2021-10-14 PROCEDURE — 25000003 PHARM REV CODE 250

## 2021-10-14 PROCEDURE — V2632 POST CHMBR INTRAOCULAR LENS: HCPCS | Performed by: OPHTHALMOLOGY

## 2021-10-14 PROCEDURE — D9220A PRA ANESTHESIA: Mod: CRNA,,, | Performed by: NURSE ANESTHETIST, CERTIFIED REGISTERED

## 2021-10-14 PROCEDURE — 37000008 HC ANESTHESIA 1ST 15 MINUTES: Performed by: OPHTHALMOLOGY

## 2021-10-14 PROCEDURE — 71000015 HC POSTOP RECOV 1ST HR: Performed by: OPHTHALMOLOGY

## 2021-10-14 PROCEDURE — 66984 XCAPSL CTRC RMVL W/O ECP: CPT | Mod: 79,LT,, | Performed by: OPHTHALMOLOGY

## 2021-10-14 PROCEDURE — D9220A PRA ANESTHESIA: Mod: ANES,,, | Performed by: ANESTHESIOLOGY

## 2021-10-14 PROCEDURE — 66984 PR REMOVAL, CATARACT, W/INSRT INTRAOC LENS, W/O ENDO CYCLO: ICD-10-PCS | Mod: 79,LT,, | Performed by: OPHTHALMOLOGY

## 2021-10-14 PROCEDURE — 63600175 PHARM REV CODE 636 W HCPCS: Performed by: NURSE ANESTHETIST, CERTIFIED REGISTERED

## 2021-10-14 PROCEDURE — 25000003 PHARM REV CODE 250: Performed by: OPHTHALMOLOGY

## 2021-10-14 PROCEDURE — 36000707: Performed by: OPHTHALMOLOGY

## 2021-10-14 PROCEDURE — D9220A PRA ANESTHESIA: ICD-10-PCS | Mod: CRNA,,, | Performed by: NURSE ANESTHETIST, CERTIFIED REGISTERED

## 2021-10-14 PROCEDURE — 63600175 PHARM REV CODE 636 W HCPCS: Performed by: OPHTHALMOLOGY

## 2021-10-14 PROCEDURE — 37000009 HC ANESTHESIA EA ADD 15 MINS: Performed by: OPHTHALMOLOGY

## 2021-10-14 PROCEDURE — 25000003 PHARM REV CODE 250: Performed by: ANESTHESIOLOGY

## 2021-10-14 PROCEDURE — D9220A PRA ANESTHESIA: ICD-10-PCS | Mod: ANES,,, | Performed by: ANESTHESIOLOGY

## 2021-10-14 DEVICE — LENS 23.0: Type: IMPLANTABLE DEVICE | Site: EYE | Status: FUNCTIONAL

## 2021-10-14 RX ORDER — CYCLOPENTOLATE HYDROCHLORIDE 10 MG/ML
1 SOLUTION/ DROPS OPHTHALMIC
Status: DISCONTINUED | OUTPATIENT
Start: 2021-10-14 | End: 2021-10-14 | Stop reason: HOSPADM

## 2021-10-14 RX ORDER — POVIDONE-IODINE 5 %
SOLUTION, NON-ORAL OPHTHALMIC (EYE)
Status: DISCONTINUED | OUTPATIENT
Start: 2021-10-14 | End: 2021-10-14 | Stop reason: HOSPADM

## 2021-10-14 RX ORDER — SODIUM CHLORIDE 0.9 % (FLUSH) 0.9 %
10 SYRINGE (ML) INJECTION
Status: DISCONTINUED | OUTPATIENT
Start: 2021-10-14 | End: 2021-10-14 | Stop reason: HOSPADM

## 2021-10-14 RX ORDER — SODIUM CHLORIDE 0.9 G/100ML
IRRIGANT IRRIGATION
Status: DISCONTINUED | OUTPATIENT
Start: 2021-10-14 | End: 2021-10-14 | Stop reason: HOSPADM

## 2021-10-14 RX ORDER — LIDOCAINE HYDROCHLORIDE 10 MG/ML
1 INJECTION, SOLUTION EPIDURAL; INFILTRATION; INTRACAUDAL; PERINEURAL ONCE
Status: DISCONTINUED | OUTPATIENT
Start: 2021-10-14 | End: 2021-10-14 | Stop reason: HOSPADM

## 2021-10-14 RX ORDER — TROPICAMIDE 10 MG/ML
1 SOLUTION/ DROPS OPHTHALMIC
Status: COMPLETED | OUTPATIENT
Start: 2021-10-14 | End: 2021-10-14

## 2021-10-14 RX ORDER — TETRACAINE HYDROCHLORIDE 5 MG/ML
1 SOLUTION OPHTHALMIC
Status: DISCONTINUED | OUTPATIENT
Start: 2021-10-14 | End: 2021-10-14 | Stop reason: HOSPADM

## 2021-10-14 RX ORDER — MIDAZOLAM HYDROCHLORIDE 1 MG/ML
INJECTION, SOLUTION INTRAMUSCULAR; INTRAVENOUS
Status: DISCONTINUED | OUTPATIENT
Start: 2021-10-14 | End: 2021-10-14

## 2021-10-14 RX ORDER — ACETAMINOPHEN 325 MG/1
650 TABLET ORAL EVERY 4 HOURS PRN
Status: DISCONTINUED | OUTPATIENT
Start: 2021-10-14 | End: 2021-10-14 | Stop reason: HOSPADM

## 2021-10-14 RX ORDER — LIDOCAINE HYDROCHLORIDE 20 MG/ML
INJECTION, SOLUTION INFILTRATION; PERINEURAL
Status: DISCONTINUED | OUTPATIENT
Start: 2021-10-14 | End: 2021-10-14 | Stop reason: HOSPADM

## 2021-10-14 RX ORDER — PHENYLEPHRINE HYDROCHLORIDE 25 MG/ML
1 SOLUTION/ DROPS OPHTHALMIC
Status: COMPLETED | OUTPATIENT
Start: 2021-10-14 | End: 2021-10-14

## 2021-10-14 RX ORDER — PREDNISOLONE ACETATE 10 MG/ML
SUSPENSION/ DROPS OPHTHALMIC
Status: DISCONTINUED | OUTPATIENT
Start: 2021-10-14 | End: 2021-10-14 | Stop reason: HOSPADM

## 2021-10-14 RX ORDER — HYDROCODONE BITARTRATE AND ACETAMINOPHEN 5; 325 MG/1; MG/1
1 TABLET ORAL EVERY 4 HOURS PRN
Status: DISCONTINUED | OUTPATIENT
Start: 2021-10-14 | End: 2021-10-14 | Stop reason: HOSPADM

## 2021-10-14 RX ORDER — SODIUM CHLORIDE 9 MG/ML
INJECTION, SOLUTION INTRAVENOUS CONTINUOUS
Status: DISCONTINUED | OUTPATIENT
Start: 2021-10-14 | End: 2021-10-14 | Stop reason: HOSPADM

## 2021-10-14 RX ORDER — OFLOXACIN 3 MG/ML
1 SOLUTION/ DROPS OPHTHALMIC
Status: DISCONTINUED | OUTPATIENT
Start: 2021-10-14 | End: 2021-10-14 | Stop reason: HOSPADM

## 2021-10-14 RX ADMIN — PHENYLEPHRINE HYDROCHLORIDE 1 DROP: 25 SOLUTION/ DROPS OPHTHALMIC at 06:10

## 2021-10-14 RX ADMIN — OFLOXACIN 1 DROP: 3 SOLUTION OPHTHALMIC at 06:10

## 2021-10-14 RX ADMIN — TROPICAMIDE 1 DROP: 10 SOLUTION/ DROPS OPHTHALMIC at 07:10

## 2021-10-14 RX ADMIN — SODIUM CHLORIDE: 0.9 INJECTION, SOLUTION INTRAVENOUS at 07:10

## 2021-10-14 RX ADMIN — TROPICAMIDE 1 DROP: 10 SOLUTION/ DROPS OPHTHALMIC at 06:10

## 2021-10-14 RX ADMIN — MIDAZOLAM HYDROCHLORIDE 1 MG: 1 INJECTION, SOLUTION INTRAMUSCULAR; INTRAVENOUS at 07:10

## 2021-10-14 RX ADMIN — TETRACAINE HYDROCHLORIDE 1 DROP: 5 SOLUTION OPHTHALMIC at 06:10

## 2021-10-14 RX ADMIN — CYCLOPENTOLATE HYDROCHLORIDE 1 DROP: 10 SOLUTION/ DROPS OPHTHALMIC at 06:10

## 2021-10-14 RX ADMIN — OFLOXACIN 1 DROP: 3 SOLUTION OPHTHALMIC at 07:10

## 2021-10-14 RX ADMIN — CYCLOPENTOLATE HYDROCHLORIDE 1 DROP: 10 SOLUTION/ DROPS OPHTHALMIC at 07:10

## 2021-10-14 RX ADMIN — PHENYLEPHRINE HYDROCHLORIDE 1 DROP: 25 SOLUTION/ DROPS OPHTHALMIC at 07:10

## 2021-10-15 ENCOUNTER — OFFICE VISIT (OUTPATIENT)
Dept: OPHTHALMOLOGY | Facility: CLINIC | Age: 61
End: 2021-10-15
Payer: MEDICAID

## 2021-10-15 VITALS — DIASTOLIC BLOOD PRESSURE: 80 MMHG | HEART RATE: 52 BPM | SYSTOLIC BLOOD PRESSURE: 153 MMHG

## 2021-10-15 DIAGNOSIS — Z98.890 POST-OPERATIVE STATE: Primary | ICD-10-CM

## 2021-10-15 PROCEDURE — 99024 PR POST-OP FOLLOW-UP VISIT: ICD-10-PCS | Mod: ,,, | Performed by: OPHTHALMOLOGY

## 2021-10-15 PROCEDURE — 99999 PR PBB SHADOW E&M-EST. PATIENT-LVL III: CPT | Mod: PBBFAC,,, | Performed by: OPHTHALMOLOGY

## 2021-10-15 PROCEDURE — 99213 OFFICE O/P EST LOW 20 MIN: CPT | Mod: PBBFAC,PO | Performed by: OPHTHALMOLOGY

## 2021-10-15 PROCEDURE — 99024 POSTOP FOLLOW-UP VISIT: CPT | Mod: ,,, | Performed by: OPHTHALMOLOGY

## 2021-10-15 PROCEDURE — 99999 PR PBB SHADOW E&M-EST. PATIENT-LVL III: ICD-10-PCS | Mod: PBBFAC,,, | Performed by: OPHTHALMOLOGY

## 2021-10-22 ENCOUNTER — OFFICE VISIT (OUTPATIENT)
Dept: OPHTHALMOLOGY | Facility: CLINIC | Age: 61
End: 2021-10-22
Payer: MEDICAID

## 2021-10-22 DIAGNOSIS — Z98.890 POST-OPERATIVE STATE: Primary | ICD-10-CM

## 2021-10-22 PROCEDURE — 99999 PR PBB SHADOW E&M-EST. PATIENT-LVL II: ICD-10-PCS | Mod: PBBFAC,,, | Performed by: OPHTHALMOLOGY

## 2021-10-22 PROCEDURE — 99024 POSTOP FOLLOW-UP VISIT: CPT | Mod: ,,, | Performed by: OPHTHALMOLOGY

## 2021-10-22 PROCEDURE — 99024 PR POST-OP FOLLOW-UP VISIT: ICD-10-PCS | Mod: ,,, | Performed by: OPHTHALMOLOGY

## 2021-10-22 PROCEDURE — 99212 OFFICE O/P EST SF 10 MIN: CPT | Mod: PBBFAC,PO | Performed by: OPHTHALMOLOGY

## 2021-10-22 PROCEDURE — 99999 PR PBB SHADOW E&M-EST. PATIENT-LVL II: CPT | Mod: PBBFAC,,, | Performed by: OPHTHALMOLOGY

## 2021-12-16 ENCOUNTER — LAB VISIT (OUTPATIENT)
Dept: LAB | Facility: HOSPITAL | Age: 61
End: 2021-12-16
Attending: INTERNAL MEDICINE
Payer: MEDICAID

## 2021-12-16 DIAGNOSIS — E11.9 TYPE 2 DIABETES MELLITUS WITHOUT COMPLICATION, WITHOUT LONG-TERM CURRENT USE OF INSULIN: ICD-10-CM

## 2021-12-16 LAB
ESTIMATED AVG GLUCOSE: 140 MG/DL (ref 68–131)
HBA1C MFR BLD: 6.5 % (ref 4–5.6)

## 2021-12-16 PROCEDURE — 83036 HEMOGLOBIN GLYCOSYLATED A1C: CPT | Performed by: INTERNAL MEDICINE

## 2021-12-16 PROCEDURE — 36415 COLL VENOUS BLD VENIPUNCTURE: CPT | Mod: PO | Performed by: INTERNAL MEDICINE

## 2021-12-30 DIAGNOSIS — E11.9 TYPE 2 DIABETES MELLITUS WITHOUT COMPLICATION, WITHOUT LONG-TERM CURRENT USE OF INSULIN: ICD-10-CM

## 2021-12-30 RX ORDER — DULAGLUTIDE 0.75 MG/.5ML
0.75 INJECTION, SOLUTION SUBCUTANEOUS
Qty: 4 PEN | Refills: 2 | Status: SHIPPED | OUTPATIENT
Start: 2021-12-30 | End: 2022-02-07

## 2022-01-20 ENCOUNTER — PATIENT MESSAGE (OUTPATIENT)
Dept: OTHER | Facility: OTHER | Age: 62
End: 2022-01-20
Payer: MEDICAID

## 2022-01-26 ENCOUNTER — LAB VISIT (OUTPATIENT)
Dept: LAB | Facility: HOSPITAL | Age: 62
End: 2022-01-26
Attending: INTERNAL MEDICINE
Payer: MEDICAID

## 2022-01-26 ENCOUNTER — PATIENT MESSAGE (OUTPATIENT)
Dept: FAMILY MEDICINE | Facility: CLINIC | Age: 62
End: 2022-01-26
Payer: MEDICAID

## 2022-01-26 ENCOUNTER — TELEPHONE (OUTPATIENT)
Dept: FAMILY MEDICINE | Facility: CLINIC | Age: 62
End: 2022-01-26
Payer: MEDICAID

## 2022-01-26 DIAGNOSIS — E11.9 TYPE 2 DIABETES MELLITUS WITHOUT COMPLICATION, WITHOUT LONG-TERM CURRENT USE OF INSULIN: ICD-10-CM

## 2022-01-26 LAB
ALBUMIN SERPL BCP-MCNC: 3.9 G/DL (ref 3.5–5.2)
ALP SERPL-CCNC: 76 U/L (ref 55–135)
ALT SERPL W/O P-5'-P-CCNC: 24 U/L (ref 10–44)
ANION GAP SERPL CALC-SCNC: 8 MMOL/L (ref 8–16)
AST SERPL-CCNC: 16 U/L (ref 10–40)
BASOPHILS # BLD AUTO: 0.07 K/UL (ref 0–0.2)
BASOPHILS NFR BLD: 1.1 % (ref 0–1.9)
BILIRUB SERPL-MCNC: 0.3 MG/DL (ref 0.1–1)
BUN SERPL-MCNC: 19 MG/DL (ref 6–20)
CALCIUM SERPL-MCNC: 9.2 MG/DL (ref 8.7–10.5)
CHLORIDE SERPL-SCNC: 109 MMOL/L (ref 95–110)
CHOLEST SERPL-MCNC: 191 MG/DL (ref 120–199)
CHOLEST/HDLC SERPL: 3.3 {RATIO} (ref 2–5)
CO2 SERPL-SCNC: 25 MMOL/L (ref 23–29)
CREAT SERPL-MCNC: 0.8 MG/DL (ref 0.5–1.4)
DIFFERENTIAL METHOD: ABNORMAL
EOSINOPHIL # BLD AUTO: 0.3 K/UL (ref 0–0.5)
EOSINOPHIL NFR BLD: 4 % (ref 0–8)
ERYTHROCYTE [DISTWIDTH] IN BLOOD BY AUTOMATED COUNT: 12.2 % (ref 11.5–14.5)
EST. GFR  (AFRICAN AMERICAN): >60 ML/MIN/1.73 M^2
EST. GFR  (NON AFRICAN AMERICAN): >60 ML/MIN/1.73 M^2
ESTIMATED AVG GLUCOSE: 123 MG/DL (ref 68–131)
GLUCOSE SERPL-MCNC: 112 MG/DL (ref 70–110)
HBA1C MFR BLD: 5.9 % (ref 4–5.6)
HCT VFR BLD AUTO: 37.7 % (ref 37–48.5)
HDLC SERPL-MCNC: 58 MG/DL (ref 40–75)
HDLC SERPL: 30.4 % (ref 20–50)
HGB BLD-MCNC: 12.1 G/DL (ref 12–16)
IMM GRANULOCYTES # BLD AUTO: 0.02 K/UL (ref 0–0.04)
IMM GRANULOCYTES NFR BLD AUTO: 0.3 % (ref 0–0.5)
LDLC SERPL CALC-MCNC: 117.6 MG/DL (ref 63–159)
LYMPHOCYTES # BLD AUTO: 2.6 K/UL (ref 1–4.8)
LYMPHOCYTES NFR BLD: 40.5 % (ref 18–48)
MCH RBC QN AUTO: 30.7 PG (ref 27–31)
MCHC RBC AUTO-ENTMCNC: 32.1 G/DL (ref 32–36)
MCV RBC AUTO: 96 FL (ref 82–98)
MONOCYTES # BLD AUTO: 0.5 K/UL (ref 0.3–1)
MONOCYTES NFR BLD: 7.2 % (ref 4–15)
NEUTROPHILS # BLD AUTO: 3 K/UL (ref 1.8–7.7)
NEUTROPHILS NFR BLD: 46.9 % (ref 38–73)
NONHDLC SERPL-MCNC: 133 MG/DL
NRBC BLD-RTO: 0 /100 WBC
PLATELET # BLD AUTO: 256 K/UL (ref 150–450)
PMV BLD AUTO: 11 FL (ref 9.2–12.9)
POTASSIUM SERPL-SCNC: 4.1 MMOL/L (ref 3.5–5.1)
PROT SERPL-MCNC: 8 G/DL (ref 6–8.4)
RBC # BLD AUTO: 3.94 M/UL (ref 4–5.4)
SODIUM SERPL-SCNC: 142 MMOL/L (ref 136–145)
TRIGL SERPL-MCNC: 77 MG/DL (ref 30–150)
WBC # BLD AUTO: 6.42 K/UL (ref 3.9–12.7)

## 2022-01-26 PROCEDURE — 36415 COLL VENOUS BLD VENIPUNCTURE: CPT | Mod: PO | Performed by: INTERNAL MEDICINE

## 2022-01-26 PROCEDURE — 80061 LIPID PANEL: CPT | Performed by: INTERNAL MEDICINE

## 2022-01-26 PROCEDURE — 80053 COMPREHEN METABOLIC PANEL: CPT | Performed by: INTERNAL MEDICINE

## 2022-01-26 PROCEDURE — 83036 HEMOGLOBIN GLYCOSYLATED A1C: CPT | Performed by: INTERNAL MEDICINE

## 2022-01-26 PROCEDURE — 85025 COMPLETE CBC W/AUTO DIFF WBC: CPT | Performed by: INTERNAL MEDICINE

## 2022-01-26 NOTE — PROGRESS NOTES
Chief Complaint  Chief Complaint   Patient presents with    Urinary Tract Infection     Symptoms started about 4 days ago       HPI    HPI   MsDamien Trevizo is a 61 y.o female with multiple medical problems as listed below. The patient presents to clinic with c/o pressure, discomfort and pulling for 4 days. Denies dysuria but does c/o fequency. Holds urine a lot.     Did AZO with minimal relief.  PAST MEDICAL HISTORY:  Past Medical History:   Diagnosis Date    Cataract     Diabetes mellitus     does not take any meds for same    High cholesterol     Hypertension        PAST SURGICAL HISTORY:  Past Surgical History:   Procedure Laterality Date    CATARACT EXTRACTION W/  INTRAOCULAR LENS IMPLANT Right 2021    Dr. Ferreira     SECTION      CHOLECYSTECTOMY      EXTRACTION OF CATARACT Left 10/14/2021    Procedure: EXTRACTION, CATARACT;  Surgeon: Mina Ferreira MD;  Location: [facility] OR;  Service: Ophthalmology;  Laterality: Left;  RN Phone Pre Op. 10-6-21. Covid NEGATIVE 10-13-21. Arrival 06:00 am.  [place]    EYE SURGERY Right     catartact    HYSTERECTOMY      Age 53, Fibroids    INTRAOCULAR PROSTHESES INSERTION Right 2021    Procedure: INSERTION, IOL PROSTHESIS;  Surgeon: Mina Ferreira MD;  Location: [facility] OR;  Service: Ophthalmology;  Laterality: Right;    INTRAOCULAR PROSTHESES INSERTION Left 10/14/2021    Procedure: INSERTION, IOL PROSTHESIS;  Surgeon: Mina Ferreira MD;  Location: [facility] OR;  Service: Ophthalmology;  Laterality: Left;    PHACOEMULSIFICATION OF CATARACT Right 2021    Procedure: PHACOEMULSIFICATION, CATARACT;  Surgeon: Mina Ferreira MD;  Location: [facility] OR;  Service: Ophthalmology;  Laterality: Right;  RN Phone Pre Op 21. Covid NEGATIVE ON  21..  Arrival 05:30 am.  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     Employer: [employer]   Tobacco Use    Smoking status: Never Smoker    Smokeless tobacco: Never Used   Substance and Sexual Activity    Alcohol use: Yes     Comment: socially    Drug use: Never       FAMILY HISTORY:  Family History   Problem Relation Age of Onset    Coronary artery disease Mother     Diabetes Mother     Hypertension Mother     Cataracts Mother     Stroke Father     Hypertension Father     Prostate cancer Father     Cataracts Father     Lung cancer Brother     Stomach cancer Brother     Glaucoma Daughter     Glaucoma Daughter     Amblyopia Neg Hx     Blindness Neg Hx     Macular degeneration Neg Hx     Retinal detachment Neg Hx     Strabismus Neg Hx        ALLERGIES AND MEDICATIONS: updated and reviewed.  Review of patient's allergies indicates:   Allergen Reactions    Oxycodone-acetaminophen Itching     Current Outpatient Medications   Medication Sig Dispense Refill    atorvastatin (LIPITOR) 40 MG tablet Take 1 tablet (40 mg total) by mouth once daily. 90 tablet 3    blood-glucose meter kit To check BG 1 times daily, to use with insurance preferred meter 1 each 0    dulaglutide (TRULICITY) 0.75 mg/0.5 mL pen injector Inject 0.75 mg into the skin every 7 days. 4 pen 2    hydroCHLOROthiazide (HYDRODIURIL) 25 MG tablet Take 1 tablet (25 mg total) by mouth once daily. 90 tablet 3    lisinopriL (PRINIVIL,ZESTRIL) 40 MG tablet Take 1 tablet (40 mg total) by mouth once daily. 90 tablet 3    gabapentin (NEURONTIN) 100 MG capsule Take 1 capsule (100 mg total) by mouth 3 (three) times daily. for 15 days 45 capsule 0    ibuprofen (ADVIL,MOTRIN) 200 MG tablet Take 200 mg by mouth every 6 (six) hours as needed for Pain.      lancets (ONETOUCH DELICA LANCETS) 33 gauge Misc 1 lancet by Misc.(Non-Drug; Combo Route) route once daily. ONCE DAILY BLOOD SUGAR TESTING; WHICHEVER BRAND COVERED BY INSURANCE 30 each 11    nitrofurantoin,  "macrocrystal-monohydrate, (MACROBID) 100 MG capsule Take 1 capsule (100 mg total) by mouth 2 (two) times daily. for 5 days 10 capsule 0     No current facility-administered medications for this visit.       Patient Care Team:  Diaz Yip MD as PCP - General (Internal Medicine)  Daniele Guzman MA as Care Coordinator  Sharon Mercedes as Digital Medicine Health   Darryl Lockett PharmD as Diabetes Digital Medicine Clinician  Darryl Lockett PharmD as Hypertension Digital Medicine Clinician  Diaz Yip MD as Diabetes Digital Medicine Responsible Provider (Internal Medicine)  Diaz Yip MD as Hypertension Digital Medicine Responsible Provider (Internal Medicine)  Medicaid (Non-[facility]) as Hypertension Digital Medicine Contract  Medicaid (Non-[facility]) as Diabetes Digital Medicine Contract    ROS  Review of Systems   Constitutional: Negative for chills, fatigue, fever and unexpected weight change.   HENT: Negative for congestion, ear discharge, ear pain and postnasal drip.    Eyes: Negative for photophobia, pain and visual disturbance.   Respiratory: Negative for cough, shortness of breath and wheezing.    Cardiovascular: Negative for chest pain, palpitations and leg swelling.   Gastrointestinal: Negative for abdominal pain, constipation, diarrhea, nausea and vomiting.   Genitourinary: Positive for frequency and pelvic pain. Negative for dysuria, urgency and vaginal discharge.        Pressure and pulling   Musculoskeletal: Negative for back pain, joint swelling and neck stiffness.   Skin: Negative for rash.   Neurological: Negative for weakness and headaches.   Psychiatric/Behavioral: Negative for dysphoric mood and sleep disturbance. The patient is not nervous/anxious.            Physical Exam  Vitals:    01/27/22 1513   BP: 130/86   Pulse: (!) 56   Temp: 98.1 °F (36.7 °C)   TempSrc: Oral   SpO2: 99%   Weight: 77.1 kg (169 lb 15.6 oz)   Height: 5' 6" (1.676 m)    Body mass index is 27.43 kg/m².  Weight: 77.1 kg " "(169 lb 15.6 oz)   Height: 5' 6" (167.6 cm)     Physical Exam  Constitutional:       General: Vital signs are normal.      Appearance: She is well-developed and well-nourished.   HENT:      Head: Normocephalic and atraumatic.      Right Ear: External ear normal.      Left Ear: External ear normal.      Nose: Nose normal.      Mouth/Throat:      Mouth: Oropharynx is clear and moist.   Eyes:      Extraocular Movements: EOM normal.      Conjunctiva/sclera: Conjunctivae normal.      Pupils: Pupils are equal, round, and reactive to light.   Neck:      Thyroid: No thyromegaly.   Cardiovascular:      Rate and Rhythm: Normal rate.      Pulses: Intact distal pulses.   Pulmonary:      Effort: Pulmonary effort is normal.   Abdominal:      Palpations: There is no hepatomegaly or splenomegaly.      Tenderness: There is right CVA tenderness and left CVA tenderness.   Genitourinary:     Vagina: Normal.      Uterus: Normal.    Musculoskeletal:         General: Normal range of motion.      Cervical back: Normal range of motion.   Skin:     General: Skin is warm and dry.   Neurological:      Mental Status: She is alert and oriented to person, place, and time.   Psychiatric:         Mood and Affect: Mood and affect normal.         Cognition and Memory: Cognition and memory normal.       Health Maintenance       Date Due Completion Date    Shingles Vaccine (1 of 2) Never done ---    Influenza Vaccine (1) 09/01/2021 11/25/2016    COVID-19 Vaccine (3 - Booster for Pfizer series) 01/12/2022 7/12/2021    Mammogram 04/14/2022 4/14/2021    Foot Exam 04/15/2022 4/15/2021    Hemoglobin A1c 04/26/2022 1/26/2022    Eye Exam 06/24/2022 6/24/2021    Diabetes Urine Screening 07/22/2022 7/22/2021    Low Dose Statin 10/22/2022 10/22/2021    Lipid Panel 01/26/2023 1/26/2022    TETANUS VACCINE 02/09/2025 2/9/2015    Colorectal Cancer Screening 11/21/2026 11/21/2016    Pneumococcal Vaccines (Age 0-64) (2 of 2 - PPSV23) 07/05/2027 1/19/2016      Health " maintenance reviewed at this time.     Assessment & Plan  Acute cystitis with hematuria  -     Urine culture  -     nitrofurantoin, macrocrystal-monohydrate, (MACROBID) 100 MG capsule; Take 1 capsule (100 mg total) by mouth 2 (two) times daily. for 5 days  Dispense: 10 capsule; Refill: 0    Dipstick: +leukocyets and + blood  Vaginal discomfort  -     POCT URINE DIPSTICK WITHOUT MICROSCOPE  -     Urine culture    Urinary frequency  -     POCT URINE DIPSTICK WITHOUT MICROSCOPE  -     Urine culture           Follow-up: Follow up in about 1 month (around 2/27/2022) for visit with PCP.

## 2022-01-27 ENCOUNTER — OFFICE VISIT (OUTPATIENT)
Dept: FAMILY MEDICINE | Facility: CLINIC | Age: 62
End: 2022-01-27
Payer: MEDICAID

## 2022-01-27 VITALS
DIASTOLIC BLOOD PRESSURE: 86 MMHG | BODY MASS INDEX: 27.32 KG/M2 | HEART RATE: 56 BPM | TEMPERATURE: 98 F | WEIGHT: 170 LBS | OXYGEN SATURATION: 99 % | HEIGHT: 66 IN | SYSTOLIC BLOOD PRESSURE: 130 MMHG

## 2022-01-27 DIAGNOSIS — N30.01 ACUTE CYSTITIS WITH HEMATURIA: Primary | ICD-10-CM

## 2022-01-27 DIAGNOSIS — N94.9 VAGINAL DISCOMFORT: ICD-10-CM

## 2022-01-27 DIAGNOSIS — R35.0 URINARY FREQUENCY: ICD-10-CM

## 2022-01-27 LAB
BILIRUB SERPL-MCNC: ABNORMAL MG/DL
BLOOD URINE, POC: ABNORMAL
CLARITY, POC UA: ABNORMAL
COLOR, POC UA: YELLOW
GLUCOSE UR QL STRIP: NORMAL
KETONES UR QL STRIP: NEGATIVE
LEUKOCYTE ESTERASE URINE, POC: ABNORMAL
NITRITE, POC UA: NEGATIVE
PH, POC UA: 5
PROTEIN, POC: NEGATIVE
SPECIFIC GRAVITY, POC UA: 1.02
UROBILINOGEN, POC UA: NORMAL

## 2022-01-27 PROCEDURE — 99213 OFFICE O/P EST LOW 20 MIN: CPT | Mod: S$PBB,,, | Performed by: NURSE PRACTITIONER

## 2022-01-27 PROCEDURE — 3075F SYST BP GE 130 - 139MM HG: CPT | Mod: CPTII,,, | Performed by: NURSE PRACTITIONER

## 2022-01-27 PROCEDURE — 87077 CULTURE AEROBIC IDENTIFY: CPT | Performed by: NURSE PRACTITIONER

## 2022-01-27 PROCEDURE — 99214 OFFICE O/P EST MOD 30 MIN: CPT | Mod: PBBFAC,PO | Performed by: NURSE PRACTITIONER

## 2022-01-27 PROCEDURE — 3008F BODY MASS INDEX DOCD: CPT | Mod: CPTII,,, | Performed by: NURSE PRACTITIONER

## 2022-01-27 PROCEDURE — 1159F PR MEDICATION LIST DOCUMENTED IN MEDICAL RECORD: ICD-10-PCS | Mod: CPTII,,, | Performed by: NURSE PRACTITIONER

## 2022-01-27 PROCEDURE — 3044F HG A1C LEVEL LT 7.0%: CPT | Mod: CPTII,,, | Performed by: NURSE PRACTITIONER

## 2022-01-27 PROCEDURE — 3079F DIAST BP 80-89 MM HG: CPT | Mod: CPTII,,, | Performed by: NURSE PRACTITIONER

## 2022-01-27 PROCEDURE — 1159F MED LIST DOCD IN RCRD: CPT | Mod: CPTII,,, | Performed by: NURSE PRACTITIONER

## 2022-01-27 PROCEDURE — 99999 PR PBB SHADOW E&M-EST. PATIENT-LVL IV: CPT | Mod: PBBFAC,,, | Performed by: NURSE PRACTITIONER

## 2022-01-27 PROCEDURE — 99213 PR OFFICE/OUTPT VISIT, EST, LEVL III, 20-29 MIN: ICD-10-PCS | Mod: S$PBB,,, | Performed by: NURSE PRACTITIONER

## 2022-01-27 PROCEDURE — 99999 PR PBB SHADOW E&M-EST. PATIENT-LVL IV: ICD-10-PCS | Mod: PBBFAC,,, | Performed by: NURSE PRACTITIONER

## 2022-01-27 PROCEDURE — 3075F PR MOST RECENT SYSTOLIC BLOOD PRESS GE 130-139MM HG: ICD-10-PCS | Mod: CPTII,,, | Performed by: NURSE PRACTITIONER

## 2022-01-27 PROCEDURE — 81002 URINALYSIS NONAUTO W/O SCOPE: CPT | Mod: PBBFAC,PO | Performed by: NURSE PRACTITIONER

## 2022-01-27 PROCEDURE — 3044F PR MOST RECENT HEMOGLOBIN A1C LEVEL <7.0%: ICD-10-PCS | Mod: CPTII,,, | Performed by: NURSE PRACTITIONER

## 2022-01-27 PROCEDURE — 1160F PR REVIEW ALL MEDS BY PRESCRIBER/CLIN PHARMACIST DOCUMENTED: ICD-10-PCS | Mod: CPTII,,, | Performed by: NURSE PRACTITIONER

## 2022-01-27 PROCEDURE — 3072F PR LOW RISK FOR RETINOPATHY: ICD-10-PCS | Mod: CPTII,,, | Performed by: NURSE PRACTITIONER

## 2022-01-27 PROCEDURE — 1160F RVW MEDS BY RX/DR IN RCRD: CPT | Mod: CPTII,,, | Performed by: NURSE PRACTITIONER

## 2022-01-27 PROCEDURE — 87186 SC STD MICRODIL/AGAR DIL: CPT | Performed by: NURSE PRACTITIONER

## 2022-01-27 PROCEDURE — 3079F PR MOST RECENT DIASTOLIC BLOOD PRESSURE 80-89 MM HG: ICD-10-PCS | Mod: CPTII,,, | Performed by: NURSE PRACTITIONER

## 2022-01-27 PROCEDURE — 87086 URINE CULTURE/COLONY COUNT: CPT | Performed by: NURSE PRACTITIONER

## 2022-01-27 PROCEDURE — 3008F PR BODY MASS INDEX (BMI) DOCUMENTED: ICD-10-PCS | Mod: CPTII,,, | Performed by: NURSE PRACTITIONER

## 2022-01-27 PROCEDURE — 3072F LOW RISK FOR RETINOPATHY: CPT | Mod: CPTII,,, | Performed by: NURSE PRACTITIONER

## 2022-01-27 PROCEDURE — 87088 URINE BACTERIA CULTURE: CPT | Performed by: NURSE PRACTITIONER

## 2022-01-27 RX ORDER — NITROFURANTOIN 25; 75 MG/1; MG/1
100 CAPSULE ORAL 2 TIMES DAILY
Qty: 10 CAPSULE | Refills: 0 | Status: SHIPPED | OUTPATIENT
Start: 2022-01-27 | End: 2022-02-01

## 2022-01-27 NOTE — PATIENT INSTRUCTIONS
Patient Education       Urinary Tract Infection Discharge Instructions, Adult   About this topic   A urinary tract infection is a UTI. It is caused by germs getting into the urinary tract. The urinary tract is made up of the kidneys, ureters, bladder, and urethra. The urethra is a tube at the bottom of the bladder. Urine flows out of this tube. The germs enter the urethra and then spread in the bladder. The ureters are small tubes that join the bladder and the kidneys. Most UTIs are infections in either your bladder or your kidneys. Bladder infections are more common and may also be called cystitis. Kidney infections are more serious and may also be called pyelonephritis.         What care is needed at home?   · Ask your doctor what you need to do when you go home. Make sure you ask questions if you do not understand what the doctor says. This way you will know what you need to do.  · Take your drugs as ordered by your doctor.  · Unless your doctor has told you otherwise, drink at least 8 to 10 glasses of water or water-based drinks each day. Do not include drinks with caffeine, like coffee or tea.  · Do not hold back your urine. Go to the bathroom every 2 to 3 hours.  What follow-up care is needed?   Your doctor may ask you to make visits to the office to check on your progress. Be sure to keep these visits.  What drugs may be needed?   The doctor may order drugs to:  · Fight an infection  · Help with pain  · Numb your bladder  · Help you pass your urine more easily  Be sure to talk to your doctor about all of your drugs if you are pregnant.  Will physical activity be limited?   Physical activities will not be limited. You may have to pass urine more often.  What changes to diet are needed?   · Do not drink beer, wine, and mixed drinks (alcohol) or caffeine. These can bother the bladder.  · Talk to your doctor about drinking cranberry juice.  What can be done to prevent this health problem?   · Gently cleanse your  genital area each day. Wipe from front to back to keep germs from going in your body.  · If your penis is uncircumcised, retract the foreskin and gently clean around the head of the penis each day.  · Consider other methods of birth control instead of a spermicide.  · Empty your bladder after having sex.  · Empty your bladder before going to sleep.  When do I need to call the doctor?   · Signs of infection. These include a fever of 100.4°F (38°C) or higher, chills, back pain, nausea, throwing up, or bloody urine.  · Signs come back after treatment ends  · You notice more blood in your urine.  · Your signs get worse or do not improve within 24 hours of starting treatment.  · You are not able to urinate for more than 8 hours.  · Your signs come back after treatment has stopped.  Teach Back: Helping You Understand   The Teach Back Method helps you understand the information we are giving you. After you talk with the staff, tell them in your own words what you learned. This helps to make sure the staff has described each thing clearly. It also helps to explain things that may have been confusing. Before going home, make sure you can do these things:  · I can tell you about my condition.  · I can tell you how to prevent this problem from coming back.  · I can tell you what I will do if my signs do not get better after 24 hours of treatment or come back after I have finished treatment.  Where can I learn more?   American Academy of [employer]icians  https://familydoctor.org/condition/urinary-tract-infections/   NHS Choices  https://www.nhs.uk/conditions/urinary-tract-infections-utis/   Last Reviewed Date   2021-06-03  Consumer Information Use and Disclaimer   This information is not specific medical advice and does not replace information you receive from your health care provider. This is only a brief summary of general information. It does NOT include all information about conditions, illnesses, injuries, tests,  procedures, treatments, therapies, discharge instructions or life-style choices that may apply to you. You must talk with your health care provider for complete information about your health and treatment options. This information should not be used to decide whether or not to accept your health care providers advice, instructions or recommendations. Only your health care provider has the knowledge and training to provide advice that is right for you.  Copyright   Copyright © 2021 [employer] and its affiliates and/or licensors. All rights reserved.

## 2022-01-27 NOTE — PROGRESS NOTES
A1c 5.9 on trulicity  Lipid good on statin  CBC, CMP WNL    Results to pt via MyChart. Followed by digital monitoring.  OV 6 months with labs.

## 2022-01-30 LAB — BACTERIA UR CULT: ABNORMAL

## 2022-01-31 NOTE — PROGRESS NOTES
Your urine culture came back. You are on the right antibiotic.     Please contact me if you have any additional concerns.    Sincerely,    Kaylynn Bolton

## 2022-02-04 PROBLEM — B34.9 ACUTE VIRAL SYNDROME: Status: RESOLVED | Noted: 2020-03-22 | Resolved: 2022-02-04

## 2022-02-05 NOTE — PROGRESS NOTES
This note was created by combination of typed  and M-Modal dictation.  Transcription errors may be present.  If there are any questions, please contact me.      Assessment and Plan:   Type 2 diabetes mellitus without complication, without long-term current use of insulin hx metformin with GI SE; saxagliptin expensive  -notes fatigue, multifactorial. She thinks a component is trulicity. Insufficient sleep hours  She prefers weekly injection rather than daily oral med  ozempic nonformulary.  Offered to try with need for PA.  After discussion she'll stay with trulicity  Stay on same dose  Re-establish with digital monitoring.  Check future labs  -     dulaglutide (TRULICITY) 0.75 mg/0.5 mL pen injector; Inject 0.75 mg into the skin every 7 days.  Dispense: 4 pen; Refill: 11  -     Comprehensive Metabolic Panel; Future; Expected date: 08/06/2022  -     Lipid Panel; Future; Expected date: 08/06/2022  -     Hemoglobin A1C; Future; Expected date: 08/06/2022  -     Microalbumin/Creatinine Ratio, Urine; Future; Expected date: 08/06/2022  -     CBC Auto Differential; Future; Expected date: 08/06/2022    Essential hypertension hx of hctz, amlodipine, lisinopril, propranolol  -BP good today. Not checking with digital monitoring, encouraged to re-establish  Last home readings were high - masked hypertension?  Need to verify BP cuff  Nurse visit to check cuff  If accurate then may need additional medication but needs to restart testing first  Consider amlodipine if bp remains high at home  -     hydroCHLOROthiazide (HYDRODIURIL) 25 MG tablet; Take 1 tablet (25 mg total) by mouth once daily.  Dispense: 90 tablet; Refill: 3  -     lisinopriL (PRINIVIL,ZESTRIL) 40 MG tablet; Take 1 tablet (40 mg total) by mouth once daily.  Dispense: 90 tablet; Refill: 3    Dyslipidemia hx of crestor, lipitor  -pre visit labs LDL not ideal but TG/HDL much better compared to before, nonHDL borderline  No changes for now  -     atorvastatin  (LIPITOR) 40 MG tablet; Take 1 tablet (40 mg total) by mouth once daily.  Dispense: 90 tablet; Refill: 3    Needs flu shot  -     Influenza - Quadrivalent *Preferred* (6 months+) (PF)    Need for shingles vaccine  -she defers today.  -     Zoster Recombinant Vaccine; Future; Expected date: 04/07/2022  -     Zoster Recombinant Vaccine    orvastatin (LIPITOR) 40 MG tablet Reorder    hydroCHLOROthiazide (HYDRODIURIL) 25 MG tablet Reorder    lisinopriL (PRINIVIL,ZESTRIL) 40 MG tablet Reorder    dulaglutide (TRULICITY) 0.75 mg/0.5 mL pen injector Reorder       meds sent this encounter:  Medications Ordered This Encounter   Medications    atorvastatin (LIPITOR) 40 MG tablet     Sig: Take 1 tablet (40 mg total) by mouth once daily.     Dispense:  90 tablet     Refill:  3    dulaglutide (TRULICITY) 0.75 mg/0.5 mL pen injector     Sig: Inject 0.75 mg into the skin every 7 days.     Dispense:  4 pen     Refill:  11     Needs PA.    hydroCHLOROthiazide (HYDRODIURIL) 25 MG tablet     Sig: Take 1 tablet (25 mg total) by mouth once daily.     Dispense:  90 tablet     Refill:  3     .    lisinopriL (PRINIVIL,ZESTRIL) 40 MG tablet     Sig: Take 1 tablet (40 mg total) by mouth once daily.     Dispense:  90 tablet     Refill:  3     .       Follow Up: No follow-ups on file. OV 6 months.    Subjective:     Chief Complaint   Patient presents with    Diabetes    Follow-up       HPI  Torsten is a 61 y.o. female, last appointment with this clinic was 1/27/2022.  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     Employer: [employer]      Social History     Social History Narrative    Not on file       No LMP recorded (lmp unknown). Patient has had a hysterectomy.    Last visit with me in  July  Diabetes.  Trulicity with weight loss.  Enroll in digital monitoring.  Hypertension, hyperlipidemia, stable    09/23/2021 cataract surgery    Saw Podiatry in follow-up in October.  Sural neuritis.    10/14 left eye cataract surgery    Saw nurse practitioner 1/27 with cystitis.  Urine culture E coli    Pre visit labs  CBC normal  CMP normal  Lipid profile  non   A1c 5.9 from 6.5    Sometimes dietary indiscretions  Painful to check her finger all the time.     Notes trulicity with SE of fatigue.  The fatigue really started when she started the trulicity.    With her normal sleep hours still feeling tired  Some initial SE of headache with injection dates.   Initial appetite loss that improves.   She stopped meds for a week with continued fatigue.   No chest pain no dyspnea.   Thinks insufficient sleep hours though.  Last night 2.5 hours she estimates.  Some days 4 hours.   Self employed; was working a full time job and self started business; quit her full time job and now focused on the business.     We talked about changing to alternative rx.   She notes that daily pills hard for her to remember  She inquired about alt within same class.  ozempic not covered/nonformulary  After discussion she'll stay with [facility].     Patient Care Team:  Diaz Yip MD as PCP - General (Internal Medicine)  Daniele Guzman MA as Care Coordinator  Sharon Mercedes as Digital Medicine Health   Darryl Lockett, PharmD as Diabetes Digital Medicine Clinician  Darryl Lockett, PharmD as Hypertension Digital Medicine Clinician  Diaz Yip MD as Diabetes Digital Medicine Responsible Provider (Internal Medicine)  Diaz Yip MD as Hypertension Digital Medicine Responsible Provider (Internal Medicine)  McIp as Hypertension Digital Medicine Contract  McIp as Diabetes Digital Medicine Contract    Patient Active Problem List    Diagnosis Date Noted    Nuclear sclerotic cataract of left eye 10/14/2021    Nuclear  sclerotic cataract of right eye 09/23/2021    Screening for colon cancer 11/21/2016 colonoscopy normal repeat 10 years. 06/01/2020 11/21/2016 colonoscopy normal repeat 10 years.      Primary insomnia hx of melatonin without relief; hx of trazodone 03/13/2020    GERD (gastroesophageal reflux disease) 01/29/2016    Dyslipidemia hx of crestor, lipitor 10/19/2015    Headache chronic with hx of topamax hx of propranolol 10/19/2015    Essential hypertension hx of hctz, amlodipine, lisinopril, propranolol 11/20/2014    Type 2 diabetes mellitus without complication, without long-term current use of insulin hx metformin with GI SE; saxagliptin expensive 11/20/2014       PAST MEDICAL PROBLEMS, PAST SURGICAL HISTORY: please see relevant portions of the electronic medical record    ALLERGIES AND MEDICATIONS: updated and reviewed.  Review of patient's allergies indicates:   Allergen Reactions    Oxycodone-acetaminophen Itching       Medication List with Changes/Refills   Current Medications    ATORVASTATIN (LIPITOR) 40 MG TABLET    Take 1 tablet (40 mg total) by mouth once daily.    BLOOD-GLUCOSE METER KIT    To check BG 1 times daily, to use with insurance preferred meter    DULAGLUTIDE (TRULICITY) 0.75 MG/0.5 ML PEN INJECTOR    Inject 0.75 mg into the skin every 7 days.    HYDROCHLOROTHIAZIDE (HYDRODIURIL) 25 MG TABLET    Take 1 tablet (25 mg total) by mouth once daily.    IBUPROFEN (ADVIL,MOTRIN) 200 MG TABLET    Take 200 mg by mouth every 6 (six) hours as needed for Pain.    LANCETS (ONETOUCH DELICA LANCETS) 33 GAUGE MISC    1 lancet by Misc.(Non-Drug; Combo Route) route once daily. ONCE DAILY BLOOD SUGAR TESTING; WHICHEVER BRAND COVERED BY INSURANCE    LISINOPRIL (PRINIVIL,ZESTRIL) 40 MG TABLET    Take 1 tablet (40 mg total) by mouth once daily.      Review of Systems   Constitutional: Positive for malaise/fatigue. Negative for chills and fever.   Respiratory: Negative for shortness of breath.    Cardiovascular:  "Negative for chest pain.   Gastrointestinal: Negative for abdominal pain.       Objective:   Physical Exam   Vitals:    02/07/22 0822   BP: 130/78   Pulse: 64   Temp: 98.1 °F (36.7 °C)   TempSrc: Oral   SpO2: 99%   Weight: 87.9 kg (193 lb 14.3 oz)   Height: 5' 6" (1.676 m)    Body mass index is 31.3 kg/m².  Weight: 87.9 kg (193 lb 14.3 oz)   Height: 5' 6" (167.6 cm)     Physical Exam  Constitutional:       General: She is not in acute distress.     Appearance: She is well-developed and well-nourished.   Eyes:      Extraocular Movements: EOM normal.   Cardiovascular:      Rate and Rhythm: Normal rate and regular rhythm.      Heart sounds: Normal heart sounds. No murmur heard.      Pulmonary:      Effort: Pulmonary effort is normal.      Breath sounds: Normal breath sounds.   Abdominal:      General: There is no distension.      Palpations: There is no mass.      Tenderness: There is no abdominal tenderness. There is no guarding.   Musculoskeletal:         General: Normal range of motion.      Right lower leg: No edema.      Left lower leg: No edema.   Skin:     General: Skin is warm and dry.   Neurological:      Mental Status: She is alert and oriented to person, place, and time.      Coordination: Coordination normal.   Psychiatric:         Mood and Affect: Mood and affect normal.         Behavior: Behavior normal.         Thought Content: Thought content normal.         Component      Latest Ref Rng & Units 1/26/2022 12/16/2021 7/22/2021   WBC      3.90 - 12.70 K/uL 6.42     RBC      4.00 - 5.40 M/uL 3.94 (L)     Hemoglobin      12.0 - 16.0 g/dL 12.1     Hematocrit      37.0 - 48.5 % 37.7     MCV      82 - 98 fL 96     MCH      27.0 - 31.0 pg 30.7     MCHC      32.0 - 36.0 g/dL 32.1     RDW      11.5 - 14.5 % 12.2     Platelets      150 - 450 K/uL 256     MPV      9.2 - 12.9 fL 11.0     Immature Granulocytes      0.0 - 0.5 % 0.3     Gran # (ANC)      1.8 - 7.7 K/uL 3.0     Immature Grans (Abs)      0.00 - 0.04 K/uL " 0.02     Lymph #      1.0 - 4.8 K/uL 2.6     Mono #      0.3 - 1.0 K/uL 0.5     Eos #      0.0 - 0.5 K/uL 0.3     Baso #      0.00 - 0.20 K/uL 0.07     nRBC      0 /100 WBC 0     Gran %      38.0 - 73.0 % 46.9     Lymph %      18.0 - 48.0 % 40.5     Mono %      4.0 - 15.0 % 7.2     Eosinophil %      0.0 - 8.0 % 4.0     Basophil %      0.0 - 1.9 % 1.1     Differential Method       Automated     Sodium      136 - 145 mmol/L 142  141   Potassium      3.5 - 5.1 mmol/L 4.1  3.3 (L)   Chloride      95 - 110 mmol/L 109  105   CO2      23 - 29 mmol/L 25  27   Glucose      70 - 110 mg/dL 112 (H)  72   BUN      6 - 20 mg/dL 19  11   Creatinine      0.5 - 1.4 mg/dL 0.8  0.9   Calcium      8.7 - 10.5 mg/dL 9.2  9.3   PROTEIN TOTAL      6.0 - 8.4 g/dL 8.0  7.8   Albumin      3.5 - 5.2 g/dL 3.9  3.7   BILIRUBIN TOTAL      0.1 - 1.0 mg/dL 0.3  0.4   Alkaline Phosphatase      55 - 135 U/L 76  122   AST      10 - 40 U/L 16  21   ALT      10 - 44 U/L 24  35   Anion Gap      8 - 16 mmol/L 8  9   eGFR if African American      >60 mL/min/1.73 m:2 >60.0  >60.0   eGFR if non African American      >60 mL/min/1.73 m:2 >60.0  >60.0   Cholesterol      120 - 199 mg/dL 191  164   Triglycerides      30 - 150 mg/dL 77  167 (H)   HDL      40 - 75 mg/dL 58  31 (L)   LDL Cholesterol External      63.0 - 159.0 mg/dL 117.6  99.6   HDL/Cholesterol Ratio      20.0 - 50.0 % 30.4  18.9 (L)   Total Cholesterol/HDL Ratio      2.0 - 5.0 3.3  5.3 (H)   Non-HDL Cholesterol      mg/dL 133  133   Microalbumin, Urine      ug/mL   7.0   Creatinine, Urine      15.0 - 325.0 mg/dL   125.0   MICROALB/CREAT RATIO      0.0 - 30.0 ug/mg   5.6   Hemoglobin A1C External      4.0 - 5.6 % 5.9 (H) 6.5 (H) 6.1 (H)   Estimated Avg Glucose      68 - 131 mg/dL 123 140 (H) 128   TSH      0.400 - 4.000 uIU/mL   2.821

## 2022-02-07 ENCOUNTER — OFFICE VISIT (OUTPATIENT)
Dept: FAMILY MEDICINE | Facility: CLINIC | Age: 62
End: 2022-02-07
Payer: MEDICAID

## 2022-02-07 VITALS
OXYGEN SATURATION: 99 % | TEMPERATURE: 98 F | WEIGHT: 193.88 LBS | DIASTOLIC BLOOD PRESSURE: 78 MMHG | HEIGHT: 66 IN | BODY MASS INDEX: 31.16 KG/M2 | SYSTOLIC BLOOD PRESSURE: 130 MMHG | HEART RATE: 64 BPM

## 2022-02-07 DIAGNOSIS — I10 ESSENTIAL HYPERTENSION: ICD-10-CM

## 2022-02-07 DIAGNOSIS — Z23 NEEDS FLU SHOT: ICD-10-CM

## 2022-02-07 DIAGNOSIS — Z23 NEED FOR SHINGLES VACCINE: ICD-10-CM

## 2022-02-07 DIAGNOSIS — E11.9 TYPE 2 DIABETES MELLITUS WITHOUT COMPLICATION, WITHOUT LONG-TERM CURRENT USE OF INSULIN: Primary | ICD-10-CM

## 2022-02-07 DIAGNOSIS — E78.5 DYSLIPIDEMIA: ICD-10-CM

## 2022-02-07 PROCEDURE — 3078F DIAST BP <80 MM HG: CPT | Mod: CPTII,,, | Performed by: INTERNAL MEDICINE

## 2022-02-07 PROCEDURE — 3075F PR MOST RECENT SYSTOLIC BLOOD PRESS GE 130-139MM HG: ICD-10-PCS | Mod: CPTII,,, | Performed by: INTERNAL MEDICINE

## 2022-02-07 PROCEDURE — 99214 OFFICE O/P EST MOD 30 MIN: CPT | Mod: S$PBB,,, | Performed by: INTERNAL MEDICINE

## 2022-02-07 PROCEDURE — 99213 OFFICE O/P EST LOW 20 MIN: CPT | Mod: PBBFAC,PO | Performed by: INTERNAL MEDICINE

## 2022-02-07 PROCEDURE — 1160F PR REVIEW ALL MEDS BY PRESCRIBER/CLIN PHARMACIST DOCUMENTED: ICD-10-PCS | Mod: CPTII,,, | Performed by: INTERNAL MEDICINE

## 2022-02-07 PROCEDURE — 3044F HG A1C LEVEL LT 7.0%: CPT | Mod: CPTII,,, | Performed by: INTERNAL MEDICINE

## 2022-02-07 PROCEDURE — 3008F BODY MASS INDEX DOCD: CPT | Mod: CPTII,,, | Performed by: INTERNAL MEDICINE

## 2022-02-07 PROCEDURE — 3072F PR LOW RISK FOR RETINOPATHY: ICD-10-PCS | Mod: CPTII,,, | Performed by: INTERNAL MEDICINE

## 2022-02-07 PROCEDURE — 1159F PR MEDICATION LIST DOCUMENTED IN MEDICAL RECORD: ICD-10-PCS | Mod: CPTII,,, | Performed by: INTERNAL MEDICINE

## 2022-02-07 PROCEDURE — 3044F PR MOST RECENT HEMOGLOBIN A1C LEVEL <7.0%: ICD-10-PCS | Mod: CPTII,,, | Performed by: INTERNAL MEDICINE

## 2022-02-07 PROCEDURE — 1160F RVW MEDS BY RX/DR IN RCRD: CPT | Mod: CPTII,,, | Performed by: INTERNAL MEDICINE

## 2022-02-07 PROCEDURE — 99999 PR PBB SHADOW E&M-EST. PATIENT-LVL III: CPT | Mod: PBBFAC,,, | Performed by: INTERNAL MEDICINE

## 2022-02-07 PROCEDURE — 4010F PR ACE/ARB THEARPY RXD/TAKEN: ICD-10-PCS | Mod: CPTII,,, | Performed by: INTERNAL MEDICINE

## 2022-02-07 PROCEDURE — 99214 PR OFFICE/OUTPT VISIT, EST, LEVL IV, 30-39 MIN: ICD-10-PCS | Mod: S$PBB,,, | Performed by: INTERNAL MEDICINE

## 2022-02-07 PROCEDURE — 3078F PR MOST RECENT DIASTOLIC BLOOD PRESSURE < 80 MM HG: ICD-10-PCS | Mod: CPTII,,, | Performed by: INTERNAL MEDICINE

## 2022-02-07 PROCEDURE — 3075F SYST BP GE 130 - 139MM HG: CPT | Mod: CPTII,,, | Performed by: INTERNAL MEDICINE

## 2022-02-07 PROCEDURE — 3008F PR BODY MASS INDEX (BMI) DOCUMENTED: ICD-10-PCS | Mod: CPTII,,, | Performed by: INTERNAL MEDICINE

## 2022-02-07 PROCEDURE — 90686 IIV4 VACC NO PRSV 0.5 ML IM: CPT | Mod: PBBFAC,PO | Performed by: INTERNAL MEDICINE

## 2022-02-07 PROCEDURE — 4010F ACE/ARB THERAPY RXD/TAKEN: CPT | Mod: CPTII,,, | Performed by: INTERNAL MEDICINE

## 2022-02-07 PROCEDURE — 3072F LOW RISK FOR RETINOPATHY: CPT | Mod: CPTII,,, | Performed by: INTERNAL MEDICINE

## 2022-02-07 PROCEDURE — 1159F MED LIST DOCD IN RCRD: CPT | Mod: CPTII,,, | Performed by: INTERNAL MEDICINE

## 2022-02-07 PROCEDURE — 99999 PR PBB SHADOW E&M-EST. PATIENT-LVL III: ICD-10-PCS | Mod: PBBFAC,,, | Performed by: INTERNAL MEDICINE

## 2022-02-07 RX ORDER — DULAGLUTIDE 0.75 MG/.5ML
0.75 INJECTION, SOLUTION SUBCUTANEOUS
Qty: 4 PEN | Refills: 11 | Status: SHIPPED | OUTPATIENT
Start: 2022-02-07 | End: 2023-02-09 | Stop reason: SDUPTHER

## 2022-02-07 RX ORDER — HYDROCHLOROTHIAZIDE 25 MG/1
25 TABLET ORAL DAILY
Qty: 90 TABLET | Refills: 3 | Status: SHIPPED | OUTPATIENT
Start: 2022-02-07 | End: 2022-06-17 | Stop reason: SDUPTHER

## 2022-02-07 RX ORDER — LISINOPRIL 40 MG/1
40 TABLET ORAL DAILY
Qty: 90 TABLET | Refills: 3 | Status: SHIPPED | OUTPATIENT
Start: 2022-02-07 | End: 2022-06-17 | Stop reason: SDUPTHER

## 2022-02-07 RX ORDER — ATORVASTATIN CALCIUM 40 MG/1
40 TABLET, FILM COATED ORAL DAILY
Qty: 90 TABLET | Refills: 3 | Status: SHIPPED | OUTPATIENT
Start: 2022-02-07 | End: 2022-06-17 | Stop reason: SDUPTHER

## 2022-04-24 ENCOUNTER — HOSPITAL ENCOUNTER (EMERGENCY)
Facility: HOSPITAL | Age: 62
Discharge: HOME | End: 2022-04-24
Attending: EMERGENCY MEDICINE
Payer: MEDICAID

## 2022-04-24 VITALS
TEMPERATURE: 98 F | WEIGHT: 175 LBS | HEIGHT: 66 IN | DIASTOLIC BLOOD PRESSURE: 80 MMHG | BODY MASS INDEX: 28.12 KG/M2 | RESPIRATION RATE: 18 BRPM | OXYGEN SATURATION: 98 % | HEART RATE: 62 BPM | SYSTOLIC BLOOD PRESSURE: 143 MMHG

## 2022-04-24 DIAGNOSIS — M25.521 RIGHT ELBOW PAIN: Primary | ICD-10-CM

## 2022-04-24 PROCEDURE — 99283 EMERGENCY DEPT VISIT LOW MDM: CPT | Mod: 25,ER

## 2022-04-24 PROCEDURE — 25000003 PHARM REV CODE 250: Mod: ER | Performed by: NURSE PRACTITIONER

## 2022-04-24 RX ORDER — IBUPROFEN 600 MG/1
600 TABLET ORAL EVERY 6 HOURS PRN
Qty: 20 TABLET | Refills: 0 | Status: SHIPPED | OUTPATIENT
Start: 2022-04-24 | End: 2023-05-16

## 2022-04-24 RX ORDER — IBUPROFEN 600 MG/1
600 TABLET ORAL
Status: COMPLETED | OUTPATIENT
Start: 2022-04-24 | End: 2022-04-24

## 2022-04-24 RX ADMIN — IBUPROFEN 600 MG: 600 TABLET ORAL at 02:04

## 2022-04-24 NOTE — ED PROVIDER NOTES
Encounter Date: 2022    SCRIBE #1 NOTE: I, Americo Medina, am scribing for, and in the presence of,  Bhavana Hanson NP. I have scribed the following portions of the note - Other sections scribed: HPI,ROS.       History     Chief Complaint   Patient presents with    Arm Pain     Pt has right arm pain that started 6 days ago. Denies any trauma.      Patient is a 61 year old female with PMHx of HTN, DM, and High cholesterol who presents to ED with complaints of right arm pain onset 6 days ago. Pain started in elbow and has radiated up and down the arm. No falls, trauma, or strenuous activity noted. She is right handed. Has been taking Ibuprofen for pain with no relief. No other complaints at this time.     The history is provided by the patient. No  was used.     Review of patient's allergies indicates:   Allergen Reactions    Oxycodone-acetaminophen Itching     Past Medical History:   Diagnosis Date    Cataract     Diabetes mellitus     does not take any meds for same    High cholesterol     Hypertension      Past Surgical History:   Procedure Laterality Date    CATARACT EXTRACTION W/  INTRAOCULAR LENS IMPLANT Right 2021    Dr. Ferreira     SECTION      CHOLECYSTECTOMY      EXTRACTION OF CATARACT Left 10/14/2021    Procedure: EXTRACTION, CATARACT;  Surgeon: Mina Ferreira MD;  Location: [facility] OR;  Service: Ophthalmology;  Laterality: Left;  RN Phone Pre Op. 10-6-21. Covid NEGATIVE 10-13-21. Arrival 06:00 am.  [place]    EYE SURGERY Right     catartact    HYSTERECTOMY      Age 53, Fibroids    INTRAOCULAR PROSTHESES INSERTION Right 2021    Procedure: INSERTION, IOL PROSTHESIS;  Surgeon: Mina Ferreira MD;  Location: [facility] OR;  Service: Ophthalmology;  Laterality: Right;    INTRAOCULAR PROSTHESES INSERTION Left 10/14/2021    Procedure: INSERTION, IOL PROSTHESIS;  Surgeon: Mina Ferreira MD;  Location: [facility] OR;  Service: Ophthalmology;  Laterality:  Left;    PHACOEMULSIFICATION OF CATARACT Right 9/23/2021    Procedure: PHACOEMULSIFICATION, CATARACT;  Surgeon: Mina Ferreira MD;  Location: [facility];  Service: Ophthalmology;  Laterality: Right;  RN Phone Pre Op 9-20-21. Covid NEGATIVE ON  9-22-21..  Arrival 05:30 am.  C A     Family History   Problem Relation Age of Onset    Coronary artery disease Mother     Diabetes Mother     Hypertension Mother     Cataracts Mother     Stroke Father     Hypertension Father     Prostate cancer Father     Cataracts Father     Lung cancer Brother     Stomach cancer Brother     Glaucoma Daughter     Glaucoma Daughter     Amblyopia Neg Hx     Blindness Neg Hx     Macular degeneration Neg Hx     Retinal detachment Neg Hx     Strabismus Neg Hx      Social History     Tobacco Use    Smoking status: Never Smoker    Smokeless tobacco: Never Used   Substance Use Topics    Alcohol use: Yes     Comment: socially    Drug use: Never     Review of Systems   Constitutional: Negative for activity change, appetite change, chills, fatigue and fever.   HENT: Negative for congestion, sore throat, trouble swallowing and voice change.    Eyes: Negative for photophobia, pain, redness and visual disturbance.   Respiratory: Negative for cough, chest tightness, shortness of breath and wheezing.    Cardiovascular: Negative for chest pain, palpitations and leg swelling.   Gastrointestinal: Negative for abdominal distention, abdominal pain, anal bleeding, constipation, diarrhea, nausea and vomiting.   Endocrine: Negative for polydipsia, polyphagia and polyuria.   Genitourinary: Negative for difficulty urinating, dysuria, frequency, hematuria, urgency, vaginal bleeding and vaginal discharge.   Musculoskeletal: Positive for arthralgias and myalgias.   Skin: Negative for rash and wound.   Neurological: Negative for dizziness, weakness, light-headedness and headaches.   Hematological: Negative for adenopathy.   All other systems  reviewed and are negative.      Physical Exam     Initial Vitals [04/24/22 1358]   BP Pulse Resp Temp SpO2   135/78 72 14 98.3 °F (36.8 °C) 99 %      MAP       --         Physical Exam    Constitutional: She appears well-developed and well-nourished. She is not diaphoretic. No distress.   HENT:   Head: Normocephalic and atraumatic.   Neck:   Normal range of motion.  Cardiovascular: Intact distal pulses.   Pulmonary/Chest: No respiratory distress.   Musculoskeletal:         General: Normal range of motion.      Right shoulder: Normal. No swelling or tenderness. Normal range of motion.      Right elbow: No swelling or effusion. Tenderness present in olecranon process.      Right wrist: Normal. No tenderness. Normal range of motion.      Cervical back: Normal and normal range of motion.      Comments: Pain with flexion extension of the right elbow.  There is no pain with micro motion of the joint.  There is no swelling, erythema, warmth.  Distal extremity neurovascularly intact.     Neurological: She is alert and oriented to person, place, and time.   Skin: Skin is warm and dry.   Psychiatric: She has a normal mood and affect. Her behavior is normal.         ED Course   Procedures  Labs Reviewed - No data to display       Imaging Results          X-Ray Elbow Complete Right (Final result)  Result time 04/24/22 14:52:06    Final result by Jonnie Simon MD (04/24/22 14:52:06)                 Impression:      No acute displaced fracture-dislocation identified.      Electronically signed by: Jonnie Simon MD  Date:    04/24/2022  Time:    14:52             Narrative:    EXAMINATION:  XR ELBOW COMPLETE 3 VIEW RIGHT    CLINICAL HISTORY:  . Pain in right elbow    TECHNIQUE:  AP, lateral, and oblique views of the right elbow were performed.    COMPARISON:  None    FINDINGS:  Bones are well mineralized. Overall alignment is within normal limits. No displaced fracture, dislocation or destructive osseous process.  No large elbow  joint effusion.  Joint spaces appear relatively maintained.  No subcutaneous emphysema or radiodense retained foreign body.                                 Medications   ibuprofen tablet 600 mg (600 mg Oral Given 4/24/22 1429)     Medical Decision Making:   History:   Old Medical Records: I decided to obtain old medical records.  Independently Interpreted Test(s):   I have ordered and independently interpreted X-rays - see prior notes.  Clinical Tests:   Radiological Study: Ordered and Reviewed  ED Management:  61-year-old female presenting to the ED with right elbow pain.  Denies injury trauma however she is right-handed.  Full exam of the above.  No evidence of neurovascular compromise or infection.  I do not suspect septic joint.  X-ray with no acute displaced fracture dislocation.  Will place an Ace wrap.  NSAIDs for pain.  Ortho follow-up.    Based on my clinical evaluation, I do not appreciate any immediate, emergent, or life threatening condition or etiology that warrants additional workup today.  I feel the patient can be discharged with close follow-up care.          Scribe Attestation:   Scribe #1: I performed the above scribed service and the documentation accurately describes the services I performed. I attest to the accuracy of the note.                 I, MIRTHA Hanson, personally performed the services described in this documentation. All medical record entries made by the scribe were at my direction and in my presence. I have reviewed the chart and agree that the record reflects my personal performance and is accurate and complete.  Clinical Impression:   Final diagnoses:  [M25.521] Right elbow pain (Primary)          ED Disposition Condition    Discharge Stable        ED Prescriptions     Medication Sig Dispense Start Date End Date Auth. Provider    ibuprofen (ADVIL,MOTRIN) 600 MG tablet Take 1 tablet (600 mg total) by mouth every 6 (six) hours as needed for Pain. 20 tablet 4/24/2022  Bhavana SANTANA  SAVANA Hanson        Follow-up Information     Follow up With Specialties Details Why Contact Info    Diaz Yip MD Internal Medicine Schedule an appointment as soon as possible for a visit  For follow-up [address] [facility] [id number] [phone]      Marva García MD Orthopedic Surgery Schedule an appointment as soon as possible for a visit  For follow-up [address] [facility] [id number] [phone]      [facility] ED Emergency Medicine Go to  If symptoms worsen [address] [facility] [id number] [phone]           Bhavana Hanson NP  04/24/22 8990

## 2022-04-24 NOTE — DISCHARGE INSTRUCTIONS
You may take ibuprofen and Tylenol as needed for pain.  Follow up with your primary care doctor and with Orthopedics should symptoms persist.  Return to the emergency department for any new or worsening symptoms such as redness or swelling of the elbow, numbness or tingling to her fingers, fever, or any other concerns.

## 2022-06-06 ENCOUNTER — TELEPHONE (OUTPATIENT)
Dept: FAMILY MEDICINE | Facility: CLINIC | Age: 62
End: 2022-06-06
Payer: MEDICAID

## 2022-06-06 NOTE — TELEPHONE ENCOUNTER
Can she call our referrals staff and see if they can help.  I see the referral was placed at the ED.  With her insurance it might be [facility] that would see her.     Referrals Coordinator at [phone]

## 2022-06-06 NOTE — TELEPHONE ENCOUNTER
Patient states she have Medicaid insurance and has been to [facility] and [facility] and they referred deilia to an orthoptic but the ones she called doesn't accept he insurance. Patient states her right arm is in pain. Patient states her arm been in pain for over a month. Patient states the ER doctor told her she may need an MRI of her neck and arm, because it look like she have arthritis. Patient states she is so much pain and uses her arm for work.  Please advise

## 2022-06-06 NOTE — TELEPHONE ENCOUNTER
----- Message from Deb Goodson sent at 6/6/2022  9:02 AM CDT -----  Type: Patient Call Back    Who called:pt    What is the request in detail:pt requesting referral for her right arm. Please call pt to discuss.     Can the clinic reply by RAMON[facility]ANER?    Would the patient rather a call back or a response via [facility]? call    Best call back number:[phone] (home)       Additional Information:

## 2022-06-06 NOTE — TELEPHONE ENCOUNTER
"   SUBJECTIVE:   CC: Ryan Cruz is an 63 year old woman who presents for preventive health visit.       Patient has been advised of split billing requirements and indicates understanding: Yes  Healthy Habits:     Getting at least 3 servings of Calcium per day:  Yes    Bi-annual eye exam:  Yes    Dental care twice a year:  Yes    Sleep apnea or symptoms of sleep apnea:  Daytime drowsiness    Diet:  Gluten-free/reduced    Frequency of exercise:  2-3 days/week    Duration of exercise:  15-30 minutes    Taking medications regularly:  Yes    Medication side effects:  Other    PHQ-2 Total Score: 2    Additional concerns today:  Yes    Saw ENT for tinnitis yesterday. Mild hearing changes but no worrisome etiology found per patient report. And rec f/u in one year.     GI: Crohns. changed biologics to Stelara. GI sx's flared over winter. No current issues    Rheum: has seen rheum and told the LE sx's were not related to her inflammatory arthritis.     Plantar fasciitis  Jammed foot when using crutches. Seen in ER and neg xray.   Then right foot started to get painful.   Did home PHYSICAL THERAPY for physical therapy and got better shoes.   Still painful for stairs. Not interested in ortho/podiatry. occ swelling at lateral feet.     3 years ago got the \"Minerva Radha treatment\" with provider in [place] to tighten the vaginal area. This helped her urinary dripping. Now using vaginal cream (compounded hormone) for dryness.     Rheum monitoring bone density    Today's PHQ-2 Score:   PHQ-2 ( 1999 Pfizer) 6/16/2021   Q1: Little interest or pleasure in doing things 1   Q2: Feeling down, depressed or hopeless 1   PHQ-2 Score 2   Q1: Little interest or pleasure in doing things Not at all   Q2: Feeling down, depressed or hopeless Several days   PHQ-2 Score 1       Abuse: Current or Past (Physical, Sexual or Emotional) - No  Do you feel safe in your environment? Yes        Social History     Tobacco Use     Smoking status: Never Smoker " Below information given to patient, verbalized   understanding       Smokeless tobacco: Never Used   Substance Use Topics     Alcohol use: No     If you drink alcohol do you typically have >3 drinks per day or >7 drinks per week? No    Alcohol Use 6/16/2021   Prescreen: >3 drinks/day or >7 drinks/week? No   Prescreen: >3 drinks/day or >7 drinks/week? -       Reviewed orders with patient.  Reviewed health maintenance and updated orders accordingly - Yes    Breast Cancer Screening:  Any new diagnosis of family breast, ovarian, or bowel cancer? No    FSH-7:   Breast CA Risk Assessment (FHS-7) 6/16/2021   Did any of your first-degree relatives have breast or ovarian cancer? No   Did any of your relatives have bilateral breast cancer? Unknown   Did any man in your family have breast cancer? No   Did any woman in your family have breast and ovarian cancer? Yes   Did any woman in your family have breast cancer before age 50 y? Yes   Do you have 2 or more relatives with breast and/or ovarian cancer? Yes   Do you have 2 or more relatives with breast and/or bowel cancer? Unknown       Mammogram Screening: Recommended annual mammography  Pertinent mammograms are reviewed under the imaging tab.    History of abnormal Pap smear: NO - age 30-65 PAP every 5 years with negative HPV co-testing recommended  PAP / HPV Latest Ref Rng & Units 9/9/2016 4/22/2014 10/12/2011   PAP - NIL NIL ASC-US(A)   HPV 16 DNA NEG Negative - -   HPV 18 DNA NEG Negative - -   OTHER HR HPV NEG Negative - -     Reviewed and updated as needed this visit by clinical staff  Tobacco  Allergies  Meds   Med Hx  Surg Hx  Fam Hx  Soc Hx        Reviewed and updated as needed this visit by Provider                    Review of Systems   Constitutional: Negative for chills and fever.   HENT: Positive for congestion. Negative for ear pain, hearing loss and sore throat.    Eyes: Negative for pain and visual disturbance.   Respiratory: Negative for cough and shortness of breath.    Cardiovascular: Positive for peripheral edema.  "Negative for chest pain and palpitations.   Gastrointestinal: Negative for abdominal pain, constipation, diarrhea, heartburn, hematochezia and nausea.   Breasts:  Negative for tenderness, breast mass and discharge.   Genitourinary: Negative for dysuria, frequency, genital sores, hematuria, pelvic pain, urgency, vaginal bleeding and vaginal discharge.   Musculoskeletal: Positive for arthralgias, joint swelling and myalgias.   Skin: Negative for rash.   Neurological: Positive for dizziness and headaches. Negative for weakness and paresthesias.   Psychiatric/Behavioral: Positive for mood changes.     Allergies  Feet swelling intermittently from feet issues.   No migraines. Mild dull ache/tension headache intermittently.   Talked with ENT about the dizziness. Could have been from low bp. Has been getting more salt and this has been much better.   Sleep pattern is not good- wake early. Feel over having all the pain and illness.      OBJECTIVE:   /58   Pulse 84   Temp 98.3  F (36.8  C) (Oral)   Ht 1.567 m (5' 1.7\")   Wt 69.6 kg (153 lb 8 oz)   LMP 04/17/2014 (Exact Date)   BMI 28.35 kg/m    Physical Exam  GENERAL: healthy, alert and no distress  EYES: Eyes grossly normal to inspection, PERRL and conjunctivae and sclerae normal  HENT: ear canals and TM's normal, nose and mouth without ulcers or lesions  NECK: no adenopathy, no asymmetry, masses, or scars and thyroid normal to palpation  RESP: lungs clear to auscultation - no rales, rhonchi or wheezes  BREAST: normal without masses, tenderness or nipple discharge and no palpable axillary masses or adenopathy  CV: regular rate and rhythm, normal S1 S2, no S3 or S4, no murmur, click or rub, no peripheral edema and peripheral pulses strong  ABDOMEN: soft, nontender, no hepatosplenomegaly, no masses and bowel sounds normal   (female): normal female external genitalia, normal urethral meatus, vaginal mucosa pink, moist, well rugated, and normal cervix/adnexa/uterus " without masses or discharge  MS: no gross musculoskeletal defects noted, no edema  SKIN: no suspicious lesions or rashes  NEURO: Normal strength and tone, mentation intact and speech normal  PSYCH: mentation appears normal, affect normal/bright    Diagnostic Test Results:  Labs reviewed in Epic    ASSESSMENT/PLAN:   1. Routine general medical examination at a health care facility  Will be establishing with pcp in [place] this [place] given increased health issues lately. Was given [facility] form to help with record transfer.     2. Inflammatory arthritis  Recent rheum visit, stable    3. Crohn's disease without complication, unspecified gastrointestinal tract location (H)  Flared this winter/spring but doing better on current biologic    4. Recurrent cold sores  Prn valtrex    5. Migraine without aura and without status migrainosus, not intractable  Stable/controlled     6. Situational stress  She is not interested in referral to psychologist or consideration of medication for sx's at this time. Denies any SI ideation and feels she is coping okay and will do better when she gets back to [place] this  fall    7. Screening for cervical cancer  - Pap imaged thin layer screen with HPV - recommended age 30 - 65 years (select HPV order below)  - HPV High Risk Types DNA Cervical    8. Family history of malignant neoplasm of breast  Reviewed option of genetics referral. The ovarian cancer hx if a non-blood related aunt and likely where cousins risk comes from. She declines referral to day  She will schedule her routine yearly mammogram this summer      COUNSELING:  Reviewed preventive health counseling, as reflected in patient instructions       Regular exercise       Healthy diet/nutrition       Vision screening       Osteoporosis prevention/bone health       (Leyla)menopause management    Estimated body mass index is 28.35 kg/m  as calculated from the following:    Height as of this encounter: 1.567 m (5'  "1.7\").    Weight as of this encounter: 69.6 kg (153 lb 8 oz).        She reports that she has never smoked. She has never used smokeless tobacco.      Counseling Resources:  ATP IV Guidelines  Pooled Cohorts Equation Calculator  Breast Cancer Risk Calculator  BRCA-Related Cancer Risk Assessment: FHS-7 Tool  FRAX Risk Assessment  ICSI Preventive Guidelines  Dietary Guidelines for Americans, 2010  USDA's MyPlate  ASA Prophylaxis  Lung CA Screening    Jaelyn Garcia MD  [facility]  "

## 2022-06-07 ENCOUNTER — TELEPHONE (OUTPATIENT)
Dept: FAMILY MEDICINE | Facility: CLINIC | Age: 62
End: 2022-06-07
Payer: MEDICAID

## 2022-06-07 NOTE — TELEPHONE ENCOUNTER
----- Message from Lee Alamo sent at 6/7/2022  8:38 AM CDT -----      Name of Who is Calling: RADHA TONEY [[id number]]      What is the request in detail: Pt called to speak with the provider say's she's still in pain and the referrals number that was given to her just rings and needs another solution.Please contact to further discuss and advise.          Can the clinic reply by [facility]: N      What Number to Call Back if not in [facility]: [phone]

## 2022-06-07 NOTE — TELEPHONE ENCOUNTER
----- Message from Lee Alamo sent at 6/7/2022  8:38 AM CDT -----      Name of Who is Calling: RADHA TONEY [[id number]]      What is the request in detail: Pt called to speak with the provider say's she's still in pain and the referrals number that was given to her just rings and needs another solution.Please contact to further discuss and advise.          Can the clinic reply by [facility]: N      What Number to Call Back if not in [facility]: [phone]

## 2022-06-20 ENCOUNTER — PATIENT MESSAGE (OUTPATIENT)
Dept: FAMILY MEDICINE | Facility: CLINIC | Age: 62
End: 2022-06-20
Payer: MEDICAID

## 2022-07-19 ENCOUNTER — PATIENT MESSAGE (OUTPATIENT)
Dept: RESEARCH | Facility: CLINIC | Age: 62
End: 2022-07-19
Payer: MEDICAID

## 2022-08-04 ENCOUNTER — LAB VISIT (OUTPATIENT)
Dept: LAB | Facility: HOSPITAL | Age: 62
End: 2022-08-04
Attending: INTERNAL MEDICINE
Payer: MEDICAID

## 2022-08-04 DIAGNOSIS — E11.9 TYPE 2 DIABETES MELLITUS WITHOUT COMPLICATION, WITHOUT LONG-TERM CURRENT USE OF INSULIN: ICD-10-CM

## 2022-08-04 LAB
ALBUMIN SERPL BCP-MCNC: 4.1 G/DL (ref 3.5–5.2)
ALP SERPL-CCNC: 78 U/L (ref 55–135)
ALT SERPL W/O P-5'-P-CCNC: 18 U/L (ref 10–44)
ANION GAP SERPL CALC-SCNC: 8 MMOL/L (ref 8–16)
AST SERPL-CCNC: 20 U/L (ref 10–40)
BASOPHILS # BLD AUTO: 0.07 K/UL (ref 0–0.2)
BASOPHILS NFR BLD: 1.3 % (ref 0–1.9)
BILIRUB SERPL-MCNC: 0.3 MG/DL (ref 0.1–1)
BUN SERPL-MCNC: 15 MG/DL (ref 6–20)
CALCIUM SERPL-MCNC: 9.8 MG/DL (ref 8.7–10.5)
CHLORIDE SERPL-SCNC: 109 MMOL/L (ref 95–110)
CHOLEST SERPL-MCNC: 194 MG/DL (ref 120–199)
CHOLEST/HDLC SERPL: 4 {RATIO} (ref 2–5)
CO2 SERPL-SCNC: 23 MMOL/L (ref 23–29)
CREAT SERPL-MCNC: 0.8 MG/DL (ref 0.5–1.4)
DIFFERENTIAL METHOD: ABNORMAL
EOSINOPHIL # BLD AUTO: 0.3 K/UL (ref 0–0.5)
EOSINOPHIL NFR BLD: 5.1 % (ref 0–8)
ERYTHROCYTE [DISTWIDTH] IN BLOOD BY AUTOMATED COUNT: 12.2 % (ref 11.5–14.5)
EST. GFR  (NO RACE VARIABLE): >60 ML/MIN/1.73 M^2
ESTIMATED AVG GLUCOSE: 146 MG/DL (ref 68–131)
GLUCOSE SERPL-MCNC: 94 MG/DL (ref 70–110)
HBA1C MFR BLD: 6.7 % (ref 4–5.6)
HCT VFR BLD AUTO: 33.2 % (ref 37–48.5)
HDLC SERPL-MCNC: 48 MG/DL (ref 40–75)
HDLC SERPL: 24.7 % (ref 20–50)
HGB BLD-MCNC: 11 G/DL (ref 12–16)
IMM GRANULOCYTES # BLD AUTO: 0.01 K/UL (ref 0–0.04)
IMM GRANULOCYTES NFR BLD AUTO: 0.2 % (ref 0–0.5)
LDLC SERPL CALC-MCNC: 119 MG/DL (ref 63–159)
LYMPHOCYTES # BLD AUTO: 2.3 K/UL (ref 1–4.8)
LYMPHOCYTES NFR BLD: 41.4 % (ref 18–48)
MCH RBC QN AUTO: 29.6 PG (ref 27–31)
MCHC RBC AUTO-ENTMCNC: 33.1 G/DL (ref 32–36)
MCV RBC AUTO: 90 FL (ref 82–98)
MONOCYTES # BLD AUTO: 0.4 K/UL (ref 0.3–1)
MONOCYTES NFR BLD: 7.6 % (ref 4–15)
NEUTROPHILS # BLD AUTO: 2.5 K/UL (ref 1.8–7.7)
NEUTROPHILS NFR BLD: 44.4 % (ref 38–73)
NONHDLC SERPL-MCNC: 146 MG/DL
NRBC BLD-RTO: 0 /100 WBC
PLATELET # BLD AUTO: 259 K/UL (ref 150–450)
PMV BLD AUTO: 10.8 FL (ref 9.2–12.9)
POTASSIUM SERPL-SCNC: 4.3 MMOL/L (ref 3.5–5.1)
PROT SERPL-MCNC: 7.7 G/DL (ref 6–8.4)
RBC # BLD AUTO: 3.71 M/UL (ref 4–5.4)
SODIUM SERPL-SCNC: 140 MMOL/L (ref 136–145)
TRIGL SERPL-MCNC: 135 MG/DL (ref 30–150)
WBC # BLD AUTO: 5.51 K/UL (ref 3.9–12.7)

## 2022-08-04 PROCEDURE — 85025 COMPLETE CBC W/AUTO DIFF WBC: CPT | Performed by: INTERNAL MEDICINE

## 2022-08-04 PROCEDURE — 83036 HEMOGLOBIN GLYCOSYLATED A1C: CPT | Performed by: INTERNAL MEDICINE

## 2022-08-04 PROCEDURE — 36415 COLL VENOUS BLD VENIPUNCTURE: CPT | Mod: PO | Performed by: INTERNAL MEDICINE

## 2022-08-04 PROCEDURE — 80053 COMPREHEN METABOLIC PANEL: CPT | Performed by: INTERNAL MEDICINE

## 2022-08-04 PROCEDURE — 80061 LIPID PANEL: CPT | Performed by: INTERNAL MEDICINE

## 2022-08-07 ENCOUNTER — PATIENT MESSAGE (OUTPATIENT)
Dept: OTHER | Facility: OTHER | Age: 62
End: 2022-08-07
Payer: MEDICAID

## 2022-08-08 ENCOUNTER — TELEPHONE (OUTPATIENT)
Dept: FAMILY MEDICINE | Facility: CLINIC | Age: 62
End: 2022-08-08
Payer: MEDICAID

## 2022-08-08 DIAGNOSIS — Z12.31 BREAST CANCER SCREENING BY MAMMOGRAM: Primary | ICD-10-CM

## 2022-08-08 NOTE — TELEPHONE ENCOUNTER
----- Message from Madeline Timmons sent at 8/8/2022  9:04 AM CDT -----  Regarding: request for an order for a mammo  Type:  Mammogram    Caller is requesting to schedule their annual mammogram appointment.  Order is not listed in EPIC.  Please enter order and contact patient to schedule.    Name of Caller: Torsten     Where would they like the mammogram performed? [facility]     Would the patient rather a call back or a response via [email or web address]? Call back     Best Call Back Number: [phone]    Additional Information: the patient is requesting an order for a mammogram. She would like a Monday if possible. Please return call at earliest convenience.

## 2022-08-11 ENCOUNTER — PATIENT MESSAGE (OUTPATIENT)
Dept: OTHER | Facility: OTHER | Age: 62
End: 2022-08-11
Payer: MEDICAID

## 2022-09-26 ENCOUNTER — PATIENT MESSAGE (OUTPATIENT)
Dept: FAMILY MEDICINE | Facility: CLINIC | Age: 62
End: 2022-09-26
Payer: MEDICAID

## 2022-09-26 DIAGNOSIS — E11.9 TYPE 2 DIABETES MELLITUS WITHOUT COMPLICATION, WITHOUT LONG-TERM CURRENT USE OF INSULIN: Primary | ICD-10-CM

## 2022-09-26 NOTE — PROGRESS NOTES
CBC mild anemia  CMP normal   Lipid profile not ideal with non , .  On statin.    A1c 6.7.  Urine microalbumin normal     Has been filling atorvastatin 40   Trulicity 0.75  HCTZ   Lisinopril    She canceled her appointment with me in August   No showed her appointment with nurse practitioner in mid September.    Results to patient via [facility].  Follow-up 3 months with pre visit labs

## 2022-09-26 NOTE — TELEPHONE ENCOUNTER
Can you please extend his labs past February 3rd because that seems to be the only availability right now as he only wants to see his provider.

## 2022-11-28 ENCOUNTER — PATIENT MESSAGE (OUTPATIENT)
Dept: OTHER | Facility: OTHER | Age: 62
End: 2022-11-28
Payer: MEDICAID

## 2022-12-27 ENCOUNTER — HOSPITAL ENCOUNTER (EMERGENCY)
Facility: HOSPITAL | Age: 62
Discharge: HOME | End: 2022-12-27
Attending: EMERGENCY MEDICINE
Payer: MEDICAID

## 2022-12-27 VITALS
HEIGHT: 66 IN | WEIGHT: 154 LBS | DIASTOLIC BLOOD PRESSURE: 64 MMHG | BODY MASS INDEX: 24.75 KG/M2 | TEMPERATURE: 100 F | OXYGEN SATURATION: 99 % | SYSTOLIC BLOOD PRESSURE: 134 MMHG | HEART RATE: 86 BPM | RESPIRATION RATE: 18 BRPM

## 2022-12-27 DIAGNOSIS — B34.9 VIRAL SYNDROME: Primary | ICD-10-CM

## 2022-12-27 DIAGNOSIS — N17.9 AKI (ACUTE KIDNEY INJURY): ICD-10-CM

## 2022-12-27 LAB
ALBUMIN SERPL BCP-MCNC: 4 G/DL (ref 3.5–5.2)
ALP SERPL-CCNC: 82 U/L (ref 55–135)
ALT SERPL W/O P-5'-P-CCNC: 43 U/L (ref 10–44)
ANION GAP SERPL CALC-SCNC: 13 MMOL/L (ref 8–16)
ANION GAP SERPL CALC-SCNC: 16 MMOL/L (ref 8–16)
AST SERPL-CCNC: 44 U/L (ref 10–40)
BASOPHILS # BLD AUTO: 0.04 K/UL (ref 0–0.2)
BASOPHILS NFR BLD: 0.3 % (ref 0–1.9)
BILIRUB SERPL-MCNC: 0.6 MG/DL (ref 0.1–1)
BILIRUB UR QL STRIP: NEGATIVE
BUN SERPL-MCNC: 27 MG/DL (ref 6–20)
BUN SERPL-MCNC: 29 MG/DL (ref 6–30)
CALCIUM SERPL-MCNC: 9 MG/DL (ref 8.7–10.5)
CHLORIDE SERPL-SCNC: 103 MMOL/L (ref 95–110)
CHLORIDE SERPL-SCNC: 106 MMOL/L (ref 95–110)
CK SERPL-CCNC: 87 U/L (ref 20–180)
CLARITY UR: ABNORMAL
CO2 SERPL-SCNC: 18 MMOL/L (ref 23–29)
COLOR UR: YELLOW
CREAT SERPL-MCNC: 1.5 MG/DL (ref 0.5–1.4)
CREAT SERPL-MCNC: 1.7 MG/DL (ref 0.5–1.4)
CTP QC/QA: YES
CTP QC/QA: YES
DIFFERENTIAL METHOD: ABNORMAL
EOSINOPHIL # BLD AUTO: 0 K/UL (ref 0–0.5)
EOSINOPHIL NFR BLD: 0 % (ref 0–8)
ERYTHROCYTE [DISTWIDTH] IN BLOOD BY AUTOMATED COUNT: 11.8 % (ref 11.5–14.5)
EST. GFR  (NO RACE VARIABLE): 34 ML/MIN/1.73 M^2
GLUCOSE SERPL-MCNC: 126 MG/DL (ref 70–110)
GLUCOSE SERPL-MCNC: 165 MG/DL (ref 70–110)
GLUCOSE UR QL STRIP: NEGATIVE
HCT VFR BLD AUTO: 34.6 % (ref 37–48.5)
HCT VFR BLD CALC: 36 %PCV (ref 36–54)
HGB BLD-MCNC: 12.2 G/DL (ref 12–16)
HGB UR QL STRIP: NEGATIVE
IMM GRANULOCYTES # BLD AUTO: 0.05 K/UL (ref 0–0.04)
IMM GRANULOCYTES NFR BLD AUTO: 0.3 % (ref 0–0.5)
KETONES UR QL STRIP: NEGATIVE
LEUKOCYTE ESTERASE UR QL STRIP: NEGATIVE
LIPASE SERPL-CCNC: 13 U/L (ref 4–60)
LYMPHOCYTES # BLD AUTO: 0.9 K/UL (ref 1–4.8)
LYMPHOCYTES NFR BLD: 5.8 % (ref 18–48)
MAGNESIUM SERPL-MCNC: 1.6 MG/DL (ref 1.6–2.6)
MCH RBC QN AUTO: 31 PG (ref 27–31)
MCHC RBC AUTO-ENTMCNC: 35.3 G/DL (ref 32–36)
MCV RBC AUTO: 88 FL (ref 82–98)
MONOCYTES # BLD AUTO: 0.6 K/UL (ref 0.3–1)
MONOCYTES NFR BLD: 4.3 % (ref 4–15)
NEUTROPHILS # BLD AUTO: 13.3 K/UL (ref 1.8–7.7)
NEUTROPHILS NFR BLD: 89.3 % (ref 38–73)
NITRITE UR QL STRIP: NEGATIVE
NRBC BLD-RTO: 0 /100 WBC
PH UR STRIP: 5 [PH] (ref 5–8)
PHOSPHATE SERPL-MCNC: 2.2 MG/DL (ref 2.7–4.5)
PLATELET # BLD AUTO: 229 K/UL (ref 150–450)
PMV BLD AUTO: 10.7 FL (ref 9.2–12.9)
POC IONIZED CALCIUM: 1.16 MMOL/L (ref 1.06–1.42)
POC MOLECULAR INFLUENZA A AGN: NEGATIVE
POC MOLECULAR INFLUENZA B AGN: NEGATIVE
POC TCO2 (MEASURED): 21 MMOL/L (ref 23–29)
POTASSIUM BLD-SCNC: 3.4 MMOL/L (ref 3.5–5.1)
POTASSIUM SERPL-SCNC: 3.3 MMOL/L (ref 3.5–5.1)
PROT SERPL-MCNC: 8.1 G/DL (ref 6–8.4)
PROT UR QL STRIP: ABNORMAL
RBC # BLD AUTO: 3.94 M/UL (ref 4–5.4)
SAMPLE: ABNORMAL
SARS-COV-2 RDRP RESP QL NAA+PROBE: NEGATIVE
SODIUM BLD-SCNC: 139 MMOL/L (ref 136–145)
SODIUM SERPL-SCNC: 134 MMOL/L (ref 136–145)
SP GR UR STRIP: 1.01 (ref 1–1.03)
URN SPEC COLLECT METH UR: ABNORMAL
UROBILINOGEN UR STRIP-ACNC: NEGATIVE EU/DL
WBC # BLD AUTO: 14.93 K/UL (ref 3.9–12.7)

## 2022-12-27 PROCEDURE — 82550 ASSAY OF CK (CPK): CPT

## 2022-12-27 PROCEDURE — 84100 ASSAY OF PHOSPHORUS: CPT

## 2022-12-27 PROCEDURE — 82330 ASSAY OF CALCIUM: CPT

## 2022-12-27 PROCEDURE — 82800 BLOOD PH: CPT

## 2022-12-27 PROCEDURE — 87502 INFLUENZA DNA AMP PROBE: CPT

## 2022-12-27 PROCEDURE — 82565 ASSAY OF CREATININE: CPT | Mod: 59

## 2022-12-27 PROCEDURE — 80047 BASIC METABLC PNL IONIZED CA: CPT

## 2022-12-27 PROCEDURE — 25000003 PHARM REV CODE 250

## 2022-12-27 PROCEDURE — 85014 HEMATOCRIT: CPT | Mod: 91

## 2022-12-27 PROCEDURE — 81003 URINALYSIS AUTO W/O SCOPE: CPT | Performed by: EMERGENCY MEDICINE

## 2022-12-27 PROCEDURE — 83690 ASSAY OF LIPASE: CPT

## 2022-12-27 PROCEDURE — 99900035 HC TECH TIME PER 15 MIN (STAT)

## 2022-12-27 PROCEDURE — 63600175 PHARM REV CODE 636 W HCPCS

## 2022-12-27 PROCEDURE — 96361 HYDRATE IV INFUSION ADD-ON: CPT

## 2022-12-27 PROCEDURE — 87635 SARS-COV-2 COVID-19 AMP PRB: CPT | Performed by: EMERGENCY MEDICINE

## 2022-12-27 PROCEDURE — 85025 COMPLETE CBC W/AUTO DIFF WBC: CPT

## 2022-12-27 PROCEDURE — 84132 ASSAY OF SERUM POTASSIUM: CPT | Mod: 91

## 2022-12-27 PROCEDURE — 96375 TX/PRO/DX INJ NEW DRUG ADDON: CPT

## 2022-12-27 PROCEDURE — 80053 COMPREHEN METABOLIC PANEL: CPT

## 2022-12-27 PROCEDURE — 96374 THER/PROPH/DIAG INJ IV PUSH: CPT

## 2022-12-27 PROCEDURE — 25000003 PHARM REV CODE 250: Performed by: EMERGENCY MEDICINE

## 2022-12-27 PROCEDURE — 83735 ASSAY OF MAGNESIUM: CPT

## 2022-12-27 PROCEDURE — 84295 ASSAY OF SERUM SODIUM: CPT

## 2022-12-27 PROCEDURE — 99285 EMERGENCY DEPT VISIT HI MDM: CPT | Mod: 25

## 2022-12-27 RX ORDER — DIPHENHYDRAMINE HYDROCHLORIDE 50 MG/ML
25 INJECTION INTRAMUSCULAR; INTRAVENOUS
Status: COMPLETED | OUTPATIENT
Start: 2022-12-27 | End: 2022-12-27

## 2022-12-27 RX ORDER — IBUPROFEN 600 MG/1
600 TABLET ORAL
Status: COMPLETED | OUTPATIENT
Start: 2022-12-27 | End: 2022-12-27

## 2022-12-27 RX ORDER — PROCHLORPERAZINE EDISYLATE 5 MG/ML
10 INJECTION INTRAMUSCULAR; INTRAVENOUS
Status: COMPLETED | OUTPATIENT
Start: 2022-12-27 | End: 2022-12-27

## 2022-12-27 RX ORDER — ONDANSETRON 4 MG/1
4 TABLET, ORALLY DISINTEGRATING ORAL EVERY 6 HOURS PRN
Qty: 20 TABLET | Refills: 0 | Status: SHIPPED | OUTPATIENT
Start: 2022-12-27 | End: 2023-05-16

## 2022-12-27 RX ADMIN — IBUPROFEN 600 MG: 600 TABLET ORAL at 03:12

## 2022-12-27 RX ADMIN — DIPHENHYDRAMINE HYDROCHLORIDE 25 MG: 50 INJECTION, SOLUTION INTRAMUSCULAR; INTRAVENOUS at 04:12

## 2022-12-27 RX ADMIN — SODIUM CHLORIDE 1000 ML: 0.9 INJECTION, SOLUTION INTRAVENOUS at 05:12

## 2022-12-27 RX ADMIN — PROCHLORPERAZINE EDISYLATE 10 MG: 5 INJECTION INTRAMUSCULAR; INTRAVENOUS at 04:12

## 2022-12-27 RX ADMIN — SODIUM CHLORIDE 1000 ML: 0.9 INJECTION, SOLUTION INTRAVENOUS at 04:12

## 2022-12-27 NOTE — ED TRIAGE NOTES
The patient presents to the ED via pov. The patient reports body aches, generalized weakness, runny nose, fevers, headache, nausea, vomiting, and diarrhea that started last night. She reports taking Advil pm last night.

## 2022-12-27 NOTE — FIRST PROVIDER EVALUATION
"Medical screening examination initiated.  I have conducted a focused provider triage encounter, findings are as follows:    Brief history of present illness:  62-year-old female presenting with generalized weakness, runny nose, fevers, headache since last night.  Denies sick contacts.  Notes mild back pain, denies dysuria.  Reports taking ibuprofen last night.  Denies chest pain shortness of breath.    Vitals:    12/27/22 1401   BP: (!) 110/90   BP Location: Left arm   Patient Position: Sitting   Pulse: 107   Resp: 18   Temp: 99.8 °F (37.7 °C)   TempSrc: Oral   SpO2: 98%   Weight: 69.9 kg (154 lb)   Height: 5' 6" (1.676 m)       Pertinent physical exam:  NAD, non-l;abored breathing    Brief workup plan:  flu/covid/UA, ibuprofen    Preliminary workup initiated; this workup will be continued and followed by the physician or advanced practice provider that is assigned to the patient when roomed.  "

## 2022-12-27 NOTE — ED PROVIDER NOTES
Encounter Date: 2022       History     Chief Complaint   Patient presents with    flu like symptoms     The patient reports body aches, generalized weakness, runny nose, fevers, headache that started last night. She reports taking Advil pm last night.      62-year-old female with past medical history of diabetes type 2, hypertension presents to ED complaining of acute onset subjective fever, rhinorrhea, nasal congestion, diarrhea, nausea, vomiting, chills, generalized myalgias, frontal headache with associated photophobia that started yesterday night.  She reports 4 episodes of emesis today.  She states this is worse headache she is ever had in her life.  She reports 4 episodes of emesis today.  She reports taking ibuprofen yesterday night.  She denies any sick contacts.  She denies any sore throat, cough, dysphagia, chest pain, shortness of breath, abdominal pain, dysuria, hematuria.  No other symptoms reported.    The history is provided by the patient. No  was used.   Review of patient's allergies indicates:   Allergen Reactions    Oxycodone-acetaminophen Itching     Past Medical History:   Diagnosis Date    Cataract     Diabetes mellitus     does not take any meds for same    High cholesterol     Hypertension      Past Surgical History:   Procedure Laterality Date    CATARACT EXTRACTION W/  INTRAOCULAR LENS IMPLANT Right 2021    Dr. Ferreira     SECTION      CHOLECYSTECTOMY      EXTRACTION OF CATARACT Left 10/14/2021    Procedure: EXTRACTION, CATARACT;  Surgeon: Mina Ferreira MD;  Location: [facility] OR;  Service: Ophthalmology;  Laterality: Left;  RN Phone Pre Op. 10-6-21. Covid NEGATIVE 10-13-21. Arrival 06:00 am.  [place]    EYE SURGERY Right     catartact    HYSTERECTOMY      Age 53, Fibroids    INTRAOCULAR PROSTHESES INSERTION Right 2021    Procedure: INSERTION, IOL PROSTHESIS;  Surgeon: Mina Ferreira MD;  Location: [facility] OR;  Service: Ophthalmology;   Laterality: Right;    INTRAOCULAR PROSTHESES INSERTION Left 10/14/2021    Procedure: INSERTION, IOL PROSTHESIS;  Surgeon: Mina Ferreira MD;  Location: [facility] OR;  Service: Ophthalmology;  Laterality: Left;    PHACOEMULSIFICATION OF CATARACT Right 9/23/2021    Procedure: PHACOEMULSIFICATION, CATARACT;  Surgeon: Mina Ferreira MD;  Location: [facility] OR;  Service: Ophthalmology;  Laterality: Right;  RN Phone Pre Op 9-20-21. Covid NEGATIVE ON  9-22-21..  Arrival 05:30 am.  C A     Family History   Problem Relation Age of Onset    Coronary artery disease Mother     Diabetes Mother     Hypertension Mother     Cataracts Mother     Stroke Father     Hypertension Father     Prostate cancer Father     Cataracts Father     Lung cancer Brother     Stomach cancer Brother     Glaucoma Daughter     Glaucoma Daughter     Amblyopia Neg Hx     Blindness Neg Hx     Macular degeneration Neg Hx     Retinal detachment Neg Hx     Strabismus Neg Hx      Social History     Tobacco Use    Smoking status: Never    Smokeless tobacco: Never   Substance Use Topics    Alcohol use: Yes     Comment: socially    Drug use: Never     Review of Systems   Constitutional:  Positive for chills and fever.   HENT:  Positive for congestion and rhinorrhea. Negative for ear pain and sore throat.    Eyes:  Positive for photophobia. Negative for redness.   Respiratory:  Negative for cough and shortness of breath.    Cardiovascular:  Negative for chest pain.   Gastrointestinal:  Positive for diarrhea, nausea and vomiting. Negative for abdominal pain.   Genitourinary:  Negative for decreased urine volume, difficulty urinating, dysuria, frequency, hematuria and urgency.   Musculoskeletal:  Positive for myalgias. Negative for back pain and neck pain.   Skin:  Negative for rash.   Neurological:  Positive for headaches.   Psychiatric/Behavioral:  Negative for confusion.      Physical Exam     Initial Vitals [12/27/22 1401]   BP Pulse Resp Temp SpO2   (!)  110/90 107 18 99.8 °F (37.7 °C) 98 %      MAP       --         Physical Exam    Nursing note and vitals reviewed.  Constitutional: She appears well-developed and well-nourished.  Non-toxic appearance. She does not appear ill.   HENT:   Head: Normocephalic and atraumatic.   Right Ear: Hearing, tympanic membrane, external ear and ear canal normal. Tympanic membrane is not perforated, not erythematous and not bulging.   Left Ear: Hearing, tympanic membrane, external ear and ear canal normal. Tympanic membrane is not perforated, not erythematous and not bulging.   Nose: Nose normal.   Mouth/Throat: Uvula is midline, oropharynx is clear and moist and mucous membranes are normal.   Eyes: Conjunctivae and EOM are normal.   Neck: Neck supple.   Normal range of motion.   Full passive range of motion without pain.     Cardiovascular:  Normal rate and regular rhythm.           Pulses:       Radial pulses are 2+ on the right side and 2+ on the left side.   Pulmonary/Chest: Effort normal and breath sounds normal. No respiratory distress.   Abdominal: Abdomen is soft. Bowel sounds are normal. She exhibits no distension. There is no abdominal tenderness. There is no rebound and no guarding.   Musculoskeletal:         General: Normal range of motion.      Cervical back: Full passive range of motion without pain, normal range of motion and neck supple. No rigidity.     Neurological: She is alert. No cranial nerve deficit.   Neuro intact.  Strength and sensation intact bilateral upper and lower extremities.   Skin: Skin is warm and dry.   Psychiatric: She has a normal mood and affect.       ED Course   Procedures  Labs Reviewed   CBC W/ AUTO DIFFERENTIAL - Abnormal; Notable for the following components:       Result Value    WBC 14.93 (*)     RBC 3.94 (*)     Hematocrit 34.6 (*)     Gran # (ANC) 13.3 (*)     Immature Grans (Abs) 0.05 (*)     Lymph # 0.9 (*)     Gran % 89.3 (*)     Lymph % 5.8 (*)     All other components within  normal limits   COMPREHENSIVE METABOLIC PANEL - Abnormal; Notable for the following components:    Sodium 134 (*)     Potassium 3.3 (*)     CO2 18 (*)     Glucose 165 (*)     BUN 27 (*)     Creatinine 1.7 (*)     AST 44 (*)     eGFR 34 (*)     All other components within normal limits   PHOSPHORUS - Abnormal; Notable for the following components:    Phosphorus 2.2 (*)     All other components within normal limits   ISTAT PROCEDURE - Abnormal; Notable for the following components:    POC Glucose 126 (*)     POC Creatinine 1.5 (*)     POC Potassium 3.4 (*)     POC TCO2 (MEASURED) 21 (*)     All other components within normal limits   LIPASE   MAGNESIUM   CK   URINALYSIS, REFLEX TO URINE CULTURE   URINALYSIS, REFLEX TO URINE CULTURE   SARS-COV-2 RDRP GENE   POCT INFLUENZA A/B MOLECULAR   ISTAT CHEM8          Imaging Results              CT Head Without Contrast (Final result)  Result time 12/27/22 17:09:07      Final result by Shannan Powell MD (12/27/22 17:09:07)                   Impression:      No acute intracranial abnormalities identified.      Electronically signed by: Shannan Powell MD  Date:    12/27/2022  Time:    17:09               Narrative:    EXAMINATION:  CT HEAD WITHOUT CONTRAST    CLINICAL HISTORY:  Headache, sudden, severe;    TECHNIQUE:  Low dose axial images were obtained through the head.  Coronal and sagittal reformations were also performed. Contrast was not administered.    COMPARISON:  None.    FINDINGS:  No evidence of acute/recent major vascular distribution cerebral infarction, intraparenchymal hemorrhage, or intra-axial space occupying lesion. The ventricular system is normal in size and configuration with no evidence of hydrocephalus. No effacement of the skull-base cisterns. No abnormal extra-axial fluid collections or blood products. Visualized paranasal sinuses and mastoid air cells are clear. The calvarium shows no significant abnormality.                                        Medications   ibuprofen tablet 600 mg (600 mg Oral Given 12/27/22 1509)   sodium chloride 0.9% bolus 1,000 mL 1,000 mL (0 mLs Intravenous Stopped 12/27/22 1759)   prochlorperazine injection Soln 10 mg (10 mg Intravenous Given 12/27/22 1628)   diphenhydrAMINE injection 25 mg (25 mg Intravenous Given 12/27/22 1628)   sodium chloride 0.9% bolus 1,000 mL 1,000 mL (0 mLs Intravenous Stopped 12/27/22 1922)     Medical Decision Making:   Clinical Tests:   Lab Tests: Ordered and Reviewed  Radiological Study: Ordered and Reviewed  ED Management:  This is a 62-year-old female with past medical history of diabetes type 2, hypertension presents to ED complaining of acute onset subjective fever, rhinorrhea, nasal congestion, diarrhea, nausea, vomiting, chills, generalized myalgias, frontal headache with associated photophobia that started yesterday night.  She reports 4 episodes of emesis today.  On physical exam, patient is well-appearing and in no acute distress.  Nontoxic appearing.  Lungs are clear to auscultation bilaterally.  Abdomen is soft and nontender.  No guarding, rigidity, rebound.  2+ radial pulses bilaterally.  Posterior oropharynx is not erythematous.  No edema or exudate.  Uvula midline.  Bilateral tympanic membrane is normal.  No erythema, bulging, or perforations.  Neuro intact.  Strength and sensation intact bilateral upper and lower extremities.  Full range of motion neck.  No neck rigidity.  COVID and flu negative.  CT head revealed no acute intracranial abnormalities.  Doubt subarachnoid hemorrhage.  CPK unremarkable.  Doubt rhabdo.  Phosphorus 2.2.  Magnesium 1.6.  Lipase unremarkable.  Doubt pancreatitis.  CBC revealed mild leukocytosis of 14.93.  Likely demargination secondary to emesis.  CMP reveals sodium 134, potassium 3.3, creatinine 1.7, BUN 27.  Glucose 165.  GFR 34.  Consulted with my attending, Dr. Chang, secondary to PAM, who states that she can be admitted or we can give her 2 L of  fluids and repeat her Chem 8 to check her renal function.  Spoke with the patient, who states that she does not want to be admitted.  2 L of fluid given.  Compazine, Benadryl, ibuprofen ordered.  Upon reassessment, patient reports relief to her symptoms.  Chem 8 revealed creatinine 1.5 and BUN 29.  Patient states she is unable to urinate.  Catheterization ordered.    Case discussed with Gary Cooney PA-C, upon shift change pending UA and disposition.                        Clinical Impression:   Final diagnoses:  [B34.9] Viral syndrome (Primary)  [N17.9] PAM (acute kidney injury)               Rodolfo Anderson PA-C  12/27/22 2012

## 2022-12-28 NOTE — DISCHARGE INSTRUCTIONS
Avoid Ibuprofen, Naproxen, and other NSAIDs for the time being. Follow-up with your primary care provider to repeat your kidney function tests, to ensure improvement.  Drink lots of fluids, stay well hydrated.  Zofran as needed for any nausea.  Continue Tylenol as needed for headache.  Over-the-counter Zyrtec or Claritin once daily to help with runny nose and congestion.  Get some good rest.    Follow-up with your primary care provider for reevaluation, further recommendations. Return to this ED if unable to treat a fever, if symptoms persist or worsen despite treatment, if you begin with shortness of breath or difficulty breathing, if you begin with frequent vomiting, if unable to eat or drink, if any other problems occur.    Thank you for coming to our Emergency Department today. It is important to remember that some problems or medical conditions are difficult to diagnose and may not be found or addressed during your Emergency Department visit.     Be sure to follow up with your primary care doctor and review all labs/imaging/tests that were performed during your ER visit with them. Some labs/imaging/tests may be outside of the normal range, and require non-emergent follow-up and/or further investigation/treatment/procedures/testing to help diagnose/exclude/prevent complications or other potentially serious medical conditions that were not discussed or addressed during your ER visit.    If you do not have a primary care doctor, you may contact the one listed on your discharge paperwork or you may also call the [facility] Appointment Desk at [phone] to schedule an appointment and establish care with one. It is important to your health that you have a primary care doctor.    Please take all medications as directed. All medications may potentially have side-effects and it is impossible to predict which medications may give you side-effects or what side-effects (if any) they will give you.. If you feel  that you are having a negative effect or side-effect of any medication you should immediately stop taking them and seek medical attention. If you feel that you are having a life-threatening reaction call 911.    Return to the ER with any questions/concerns, new/concerning symptoms, worsening or failure to improve.     Do not drive, swim, climb to height, take a bath, operate heavy machinery, drink alcohol or take potentially sedating medications, sign any legal documents or make any important decisions for 24 hours if you have received any pain medications, sedatives or mood altering drugs during your ER visit or within 24 hours of taking them if they have been prescribed to you.     You can find additional resources for Dentists, hearing aids, durable medical equipment, low cost pharmacies and other resources at [email or web address]

## 2022-12-28 NOTE — RESPIRATORY THERAPY
Latest Reference Range & Units 12/27/22 19:41   POC Chloride 95 - 110 mmol/L 106   POC Anion Gap 8 - 16 mmol/L 16   POC BUN 6 - 30 mg/dL 29   Sample  VENOUS   POC Ionized Calcium 1.06 - 1.42 mmol/L 1.16   POC Sodium 136 - 145 mmol/L 139   POC Potassium 3.5 - 5.1 mmol/L 3.4 (L)   POC Glucose 70 - 110 mg/dL 126 (H)   POC Hematocrit 36 - 54 %PCV 36   POC TCO2 (MEASURED) 23 - 29 mmol/L 21 (L)   POC Creatinine 0.5 - 1.4 mg/dL 1.5 (H)   (L): Data is abnormally low  (H): Data is abnormally high

## 2023-01-01 ENCOUNTER — HOSPITAL ENCOUNTER (EMERGENCY)
Facility: HOSPITAL | Age: 63
Discharge: LEFT BEFORE DISCHARGE | End: 2023-01-01
Payer: MEDICAID

## 2023-01-01 VITALS
BODY MASS INDEX: 27.64 KG/M2 | SYSTOLIC BLOOD PRESSURE: 127 MMHG | OXYGEN SATURATION: 99 % | TEMPERATURE: 98 F | HEIGHT: 66 IN | HEART RATE: 66 BPM | RESPIRATION RATE: 18 BRPM | WEIGHT: 172 LBS | DIASTOLIC BLOOD PRESSURE: 77 MMHG

## 2023-01-01 PROCEDURE — 99281 EMR DPT VST MAYX REQ PHY/QHP: CPT | Mod: ER

## 2023-01-01 NOTE — FIRST PROVIDER EVALUATION
"Medical screening examination initiated.  I have conducted a focused provider triage encounter, findings are as follows:    Brief history of present illness:  62-year-old female presenting to ED for diarrhea.    Vitals:    01/01/23 1151   BP: 127/77   BP Location: Right arm   Patient Position: Sitting   Pulse: 66   Resp: 18   Temp: 97.6 °F (36.4 °C)   TempSrc: Oral   SpO2: 99%   Weight: 78 kg (172 lb)   Height: 5' 6" (1.676 m)       Pertinent physical exam:  Nontoxic.  Well-appearing.  Nondistressed.    Brief workup plan:  Full physical exam once roomed.    Preliminary workup initiated; this workup will be continued and followed by the physician or advanced practice provider that is assigned to the patient when roomed.  "

## 2023-01-03 ENCOUNTER — PATIENT MESSAGE (OUTPATIENT)
Dept: FAMILY MEDICINE | Facility: CLINIC | Age: 63
End: 2023-01-03
Payer: MEDICAID

## 2023-01-03 ENCOUNTER — TELEPHONE (OUTPATIENT)
Dept: FAMILY MEDICINE | Facility: CLINIC | Age: 63
End: 2023-01-03
Payer: MEDICAID

## 2023-01-03 NOTE — TELEPHONE ENCOUNTER
Spoke with patient verbalized understanding on no availability in the clinic. Patient has been provided with the number to the [facility]

## 2023-01-03 NOTE — TELEPHONE ENCOUNTER
Spoke with patient she states that she has been having nausea and diarrhea. Patient mentioned that she went to ER and they informed her that she was severely dehydrated, and patient has been prescribed something for nausea. Patient says that she hasn't been having nausea anymore but she is still having diarrhea and also feeling very weak. Patient asked about an appointment, next available isn't until 01/20.

## 2023-01-03 NOTE — TELEPHONE ENCOUNTER
----- Message from Angle Bourgeois sent at 1/3/2023  7:53 AM CST -----  Regarding: Self/  [phone]  Type: Patient Call Back    Who called:  Patient    What is the request in detail:  Patient is needing a call from staff she stated she's been having diarrhea really bad and losing weight.  Thank you    Would the patient rather a call back or a response via [facility]?   Call back    Best call back number:   [phone]          Thank you

## 2023-01-30 ENCOUNTER — LAB VISIT (OUTPATIENT)
Dept: LAB | Facility: HOSPITAL | Age: 63
End: 2023-01-30
Attending: INTERNAL MEDICINE
Payer: MEDICAID

## 2023-01-30 DIAGNOSIS — E11.9 TYPE 2 DIABETES MELLITUS WITHOUT COMPLICATION, WITHOUT LONG-TERM CURRENT USE OF INSULIN: ICD-10-CM

## 2023-01-30 LAB
ALBUMIN SERPL BCP-MCNC: 4 G/DL (ref 3.5–5.2)
ALP SERPL-CCNC: 78 U/L (ref 55–135)
ALT SERPL W/O P-5'-P-CCNC: 13 U/L (ref 10–44)
ANION GAP SERPL CALC-SCNC: 9 MMOL/L (ref 8–16)
AST SERPL-CCNC: 12 U/L (ref 10–40)
BASOPHILS # BLD AUTO: 0.08 K/UL (ref 0–0.2)
BASOPHILS NFR BLD: 1.3 % (ref 0–1.9)
BILIRUB SERPL-MCNC: 0.3 MG/DL (ref 0.1–1)
BUN SERPL-MCNC: 15 MG/DL (ref 6–20)
CALCIUM SERPL-MCNC: 9.9 MG/DL (ref 8.7–10.5)
CHLORIDE SERPL-SCNC: 107 MMOL/L (ref 95–110)
CO2 SERPL-SCNC: 23 MMOL/L (ref 23–29)
CREAT SERPL-MCNC: 0.9 MG/DL (ref 0.5–1.4)
DIFFERENTIAL METHOD: ABNORMAL
EOSINOPHIL # BLD AUTO: 0.2 K/UL (ref 0–0.5)
EOSINOPHIL NFR BLD: 3.9 % (ref 0–8)
ERYTHROCYTE [DISTWIDTH] IN BLOOD BY AUTOMATED COUNT: 11.9 % (ref 11.5–14.5)
EST. GFR  (NO RACE VARIABLE): >60 ML/MIN/1.73 M^2
ESTIMATED AVG GLUCOSE: 143 MG/DL (ref 68–131)
GLUCOSE SERPL-MCNC: 82 MG/DL (ref 70–110)
HBA1C MFR BLD: 6.6 % (ref 4–5.6)
HCT VFR BLD AUTO: 36.8 % (ref 37–48.5)
HGB BLD-MCNC: 11.8 G/DL (ref 12–16)
IMM GRANULOCYTES # BLD AUTO: 0.02 K/UL (ref 0–0.04)
IMM GRANULOCYTES NFR BLD AUTO: 0.3 % (ref 0–0.5)
LYMPHOCYTES # BLD AUTO: 3 K/UL (ref 1–4.8)
LYMPHOCYTES NFR BLD: 47.9 % (ref 18–48)
MCH RBC QN AUTO: 29.8 PG (ref 27–31)
MCHC RBC AUTO-ENTMCNC: 32.1 G/DL (ref 32–36)
MCV RBC AUTO: 93 FL (ref 82–98)
MONOCYTES # BLD AUTO: 0.5 K/UL (ref 0.3–1)
MONOCYTES NFR BLD: 7.6 % (ref 4–15)
NEUTROPHILS # BLD AUTO: 2.4 K/UL (ref 1.8–7.7)
NEUTROPHILS NFR BLD: 39 % (ref 38–73)
NRBC BLD-RTO: 0 /100 WBC
PLATELET # BLD AUTO: 266 K/UL (ref 150–450)
PMV BLD AUTO: 11 FL (ref 9.2–12.9)
POTASSIUM SERPL-SCNC: 3.8 MMOL/L (ref 3.5–5.1)
PROT SERPL-MCNC: 7.8 G/DL (ref 6–8.4)
RBC # BLD AUTO: 3.96 M/UL (ref 4–5.4)
SODIUM SERPL-SCNC: 139 MMOL/L (ref 136–145)
WBC # BLD AUTO: 6.2 K/UL (ref 3.9–12.7)

## 2023-01-30 PROCEDURE — 80053 COMPREHEN METABOLIC PANEL: CPT | Performed by: INTERNAL MEDICINE

## 2023-01-30 PROCEDURE — 83036 HEMOGLOBIN GLYCOSYLATED A1C: CPT | Performed by: INTERNAL MEDICINE

## 2023-01-30 PROCEDURE — 36415 COLL VENOUS BLD VENIPUNCTURE: CPT | Mod: PO | Performed by: INTERNAL MEDICINE

## 2023-01-30 PROCEDURE — 85025 COMPLETE CBC W/AUTO DIFF WBC: CPT | Performed by: INTERNAL MEDICINE

## 2023-02-09 DIAGNOSIS — E11.9 TYPE 2 DIABETES MELLITUS WITHOUT COMPLICATION, WITHOUT LONG-TERM CURRENT USE OF INSULIN: ICD-10-CM

## 2023-02-09 RX ORDER — DULAGLUTIDE 0.75 MG/.5ML
0.75 INJECTION, SOLUTION SUBCUTANEOUS
Qty: 4 PEN | Refills: 11 | Status: SHIPPED | OUTPATIENT
Start: 2023-02-09 | End: 2023-02-20 | Stop reason: SDUPTHER

## 2023-02-09 NOTE — TELEPHONE ENCOUNTER
----- Message from Mary Aguilera sent at 2/9/2023  8:20 AM CST -----  Regarding: Refill request  .Type: RX Refill Request    Who Called: Self    Have you contacted your pharmacy: No     Refill or New Rx: Refill    RX Name and Strength:dulaglutide (TRULICITY) 0.75 mg/0.5 mL pen injector    Preferred Pharmacy with phone number: .  [facility] - [place] - [address]  [address]  [place] [address]  Phone: [phone] Fax: [phone]      Local or Mail Order: local    Ordering Provider: RAJIV Perales    Would the patient rather a call back or a response via [facility]? Call     Best Call Back Number: .[phone]      Additional Information:

## 2023-02-09 NOTE — TELEPHONE ENCOUNTER
No new care gaps identified.  [facility] Embedded Care Gaps. Reference number: [id number]. 2/09/2023   8:26:48 AM CST

## 2023-03-07 ENCOUNTER — PATIENT MESSAGE (OUTPATIENT)
Dept: FAMILY MEDICINE | Facility: CLINIC | Age: 63
End: 2023-03-07
Payer: MEDICAID

## 2023-03-08 ENCOUNTER — HOSPITAL ENCOUNTER (EMERGENCY)
Facility: HOSPITAL | Age: 63
Discharge: LEFT BEFORE DISCHARGE | End: 2023-03-08
Attending: EMERGENCY MEDICINE
Payer: MEDICAID

## 2023-03-08 VITALS
DIASTOLIC BLOOD PRESSURE: 75 MMHG | BODY MASS INDEX: 28.93 KG/M2 | RESPIRATION RATE: 20 BRPM | TEMPERATURE: 99 F | OXYGEN SATURATION: 99 % | HEART RATE: 84 BPM | SYSTOLIC BLOOD PRESSURE: 134 MMHG | HEIGHT: 66 IN | WEIGHT: 180 LBS

## 2023-03-08 DIAGNOSIS — U07.1 COVID-19 VIRUS DETECTED: ICD-10-CM

## 2023-03-08 LAB
CTP QC/QA: YES
INFLUENZA A ANTIGEN, POC: NEGATIVE
INFLUENZA B ANTIGEN, POC: NEGATIVE
POC RAPID STREP A: NEGATIVE
POCT GLUCOSE: 65 MG/DL (ref 70–110)
SARS-COV-2 RDRP RESP QL NAA+PROBE: POSITIVE

## 2023-03-08 PROCEDURE — 82962 GLUCOSE BLOOD TEST: CPT | Mod: ER

## 2023-03-08 PROCEDURE — 99282 EMERGENCY DEPT VISIT SF MDM: CPT | Mod: ER

## 2023-03-08 NOTE — FIRST PROVIDER EVALUATION
"Medical screening examination initiated.  I have conducted a focused provider triage encounter, findings are as follows:    Brief history of present illness:  cough, sore throat and body aches for the past 3 dyas    Vitals:    03/08/23 1053   BP: 134/75   BP Location: Right arm   Patient Position: Sitting   Pulse: 84   Resp: 20   Temp: 99.3 °F (37.4 °C)   TempSrc: Oral   SpO2: 99%   Weight: 81.6 kg (180 lb)   Height: 5' 6" (1.676 m)       Pertinent physical exam:  no respiratory distress    Brief workup plan:  covid, influenza    Preliminary workup initiated; this workup will be continued and followed by the physician or advanced practice provider that is assigned to the patient when roomed.  "

## 2023-05-02 ENCOUNTER — PATIENT MESSAGE (OUTPATIENT)
Dept: ADMINISTRATIVE | Facility: HOSPITAL | Age: 63
End: 2023-05-02
Payer: MEDICAID

## 2023-05-08 ENCOUNTER — PATIENT MESSAGE (OUTPATIENT)
Dept: FAMILY MEDICINE | Facility: CLINIC | Age: 63
End: 2023-05-08
Payer: MEDICAID

## 2023-05-08 ENCOUNTER — PATIENT OUTREACH (OUTPATIENT)
Dept: ADMINISTRATIVE | Facility: HOSPITAL | Age: 63
End: 2023-05-08
Payer: MEDICAID

## 2023-05-08 DIAGNOSIS — E11.9 TYPE 2 DIABETES MELLITUS WITHOUT COMPLICATION, WITHOUT LONG-TERM CURRENT USE OF INSULIN: Primary | ICD-10-CM

## 2023-05-09 ENCOUNTER — TELEPHONE (OUTPATIENT)
Dept: FAMILY MEDICINE | Facility: CLINIC | Age: 63
End: 2023-05-09
Payer: MEDICAID

## 2023-05-09 ENCOUNTER — HOSPITAL ENCOUNTER (OUTPATIENT)
Dept: RADIOLOGY | Facility: HOSPITAL | Age: 63
Discharge: HOME | End: 2023-05-09
Attending: INTERNAL MEDICINE
Payer: MEDICAID

## 2023-05-09 ENCOUNTER — PATIENT MESSAGE (OUTPATIENT)
Dept: INFECTIOUS DISEASES | Facility: CLINIC | Age: 63
End: 2023-05-09
Payer: MEDICAID

## 2023-05-09 ENCOUNTER — TELEPHONE (OUTPATIENT)
Dept: OPTOMETRY | Facility: CLINIC | Age: 63
End: 2023-05-09
Payer: MEDICAID

## 2023-05-09 DIAGNOSIS — Z12.31 BREAST CANCER SCREENING BY MAMMOGRAM: ICD-10-CM

## 2023-05-09 PROCEDURE — 77067 MAMMO DIGITAL SCREENING BILAT WITH TOMO: ICD-10-PCS | Mod: 26,,, | Performed by: RADIOLOGY

## 2023-05-09 PROCEDURE — 77067 SCR MAMMO BI INCL CAD: CPT | Mod: TC,PO

## 2023-05-09 PROCEDURE — 77063 BREAST TOMOSYNTHESIS BI: CPT | Mod: 26,,, | Performed by: RADIOLOGY

## 2023-05-09 PROCEDURE — 77067 SCR MAMMO BI INCL CAD: CPT | Mod: 26,,, | Performed by: RADIOLOGY

## 2023-05-09 PROCEDURE — 77063 MAMMO DIGITAL SCREENING BILAT WITH TOMO: ICD-10-PCS | Mod: 26,,, | Performed by: RADIOLOGY

## 2023-05-09 NOTE — TELEPHONE ENCOUNTER
----- Message from Mickie Marsh sent at 5/9/2023  8:11 AM CDT -----  Regarding: self [phone]  Type:  Sooner Appointment Request    Patient is requesting a sooner appointment.  Patient declined first available appointment listed as well as another facility and provider .  Patient will not accept being placed on the waitlist and is requesting a message be sent to doctor.    Name of Caller:  self     When is the first available appointment?  Nov with pcp, aug with np    Symptoms: annual    Would the patient rather a call back or a response via [facility]? Call back     Best Call Back Number:  [phone]

## 2023-05-09 NOTE — TELEPHONE ENCOUNTER
----- Message from Madhuri Bowden sent at 5/9/2023  8:18 AM CDT -----  Regarding: appt access  Contact: self @ [phone]  Pt is calling to schedule a diabetic eye exam.  There is nothing available.  Pls call with an appt.

## 2023-05-15 ENCOUNTER — PATIENT MESSAGE (OUTPATIENT)
Dept: PODIATRY | Facility: CLINIC | Age: 63
End: 2023-05-15

## 2023-05-16 ENCOUNTER — LAB VISIT (OUTPATIENT)
Dept: LAB | Facility: HOSPITAL | Age: 63
End: 2023-05-16
Attending: INTERNAL MEDICINE
Payer: MEDICAID

## 2023-05-16 ENCOUNTER — OFFICE VISIT (OUTPATIENT)
Dept: FAMILY MEDICINE | Facility: CLINIC | Age: 63
End: 2023-05-16
Payer: MEDICAID

## 2023-05-16 VITALS
OXYGEN SATURATION: 99 % | TEMPERATURE: 98 F | DIASTOLIC BLOOD PRESSURE: 62 MMHG | RESPIRATION RATE: 18 BRPM | BODY MASS INDEX: 27.21 KG/M2 | HEIGHT: 66 IN | SYSTOLIC BLOOD PRESSURE: 112 MMHG | HEART RATE: 50 BPM | WEIGHT: 169.31 LBS

## 2023-05-16 DIAGNOSIS — E78.5 DYSLIPIDEMIA: ICD-10-CM

## 2023-05-16 DIAGNOSIS — G89.29 CHRONIC RIGHT SHOULDER PAIN: ICD-10-CM

## 2023-05-16 DIAGNOSIS — Z23 NEED FOR PNEUMOCOCCAL VACCINE: ICD-10-CM

## 2023-05-16 DIAGNOSIS — B35.1 ONYCHOMYCOSIS: ICD-10-CM

## 2023-05-16 DIAGNOSIS — R10.84 ABDOMINAL PAIN, GENERALIZED: ICD-10-CM

## 2023-05-16 DIAGNOSIS — I10 ESSENTIAL HYPERTENSION: Primary | ICD-10-CM

## 2023-05-16 DIAGNOSIS — E11.9 TYPE 2 DIABETES MELLITUS WITHOUT COMPLICATION, WITHOUT LONG-TERM CURRENT USE OF INSULIN: ICD-10-CM

## 2023-05-16 DIAGNOSIS — M25.511 CHRONIC RIGHT SHOULDER PAIN: ICD-10-CM

## 2023-05-16 DIAGNOSIS — I10 ESSENTIAL HYPERTENSION: ICD-10-CM

## 2023-05-16 DIAGNOSIS — Z23 NEED FOR SHINGLES VACCINE: ICD-10-CM

## 2023-05-16 LAB
ALBUMIN SERPL BCP-MCNC: 4.4 G/DL (ref 3.5–5.2)
ALP SERPL-CCNC: 78 U/L (ref 55–135)
ALT SERPL W/O P-5'-P-CCNC: 18 U/L (ref 10–44)
AMYLASE SERPL-CCNC: 86 U/L (ref 20–110)
ANION GAP SERPL CALC-SCNC: 13 MMOL/L (ref 8–16)
AST SERPL-CCNC: 16 U/L (ref 10–40)
BASOPHILS # BLD AUTO: 0.09 K/UL (ref 0–0.2)
BASOPHILS NFR BLD: 1.4 % (ref 0–1.9)
BILIRUB SERPL-MCNC: 0.5 MG/DL (ref 0.1–1)
BUN SERPL-MCNC: 23 MG/DL (ref 6–20)
CALCIUM SERPL-MCNC: 10.3 MG/DL (ref 8.7–10.5)
CHLORIDE SERPL-SCNC: 108 MMOL/L (ref 95–110)
CHOLEST SERPL-MCNC: 208 MG/DL (ref 120–199)
CHOLEST/HDLC SERPL: 3.2 {RATIO} (ref 2–5)
CO2 SERPL-SCNC: 21 MMOL/L (ref 23–29)
CREAT SERPL-MCNC: 1 MG/DL (ref 0.5–1.4)
DIFFERENTIAL METHOD: ABNORMAL
EOSINOPHIL # BLD AUTO: 0.2 K/UL (ref 0–0.5)
EOSINOPHIL NFR BLD: 2.4 % (ref 0–8)
ERYTHROCYTE [DISTWIDTH] IN BLOOD BY AUTOMATED COUNT: 12.6 % (ref 11.5–14.5)
EST. GFR  (NO RACE VARIABLE): >60 ML/MIN/1.73 M^2
ESTIMATED AVG GLUCOSE: 120 MG/DL (ref 68–131)
GLUCOSE SERPL-MCNC: 98 MG/DL (ref 70–110)
HBA1C MFR BLD: 5.8 % (ref 4–5.6)
HCT VFR BLD AUTO: 36.8 % (ref 37–48.5)
HDLC SERPL-MCNC: 65 MG/DL (ref 40–75)
HDLC SERPL: 31.3 % (ref 20–50)
HGB BLD-MCNC: 11.6 G/DL (ref 12–16)
IMM GRANULOCYTES # BLD AUTO: 0.01 K/UL (ref 0–0.04)
IMM GRANULOCYTES NFR BLD AUTO: 0.2 % (ref 0–0.5)
LDLC SERPL CALC-MCNC: 130 MG/DL (ref 63–159)
LIPASE SERPL-CCNC: 18 U/L (ref 4–60)
LYMPHOCYTES # BLD AUTO: 2.4 K/UL (ref 1–4.8)
LYMPHOCYTES NFR BLD: 38.9 % (ref 18–48)
MCH RBC QN AUTO: 30.4 PG (ref 27–31)
MCHC RBC AUTO-ENTMCNC: 31.5 G/DL (ref 32–36)
MCV RBC AUTO: 96 FL (ref 82–98)
MONOCYTES # BLD AUTO: 0.4 K/UL (ref 0.3–1)
MONOCYTES NFR BLD: 6.5 % (ref 4–15)
NEUTROPHILS # BLD AUTO: 3.2 K/UL (ref 1.8–7.7)
NEUTROPHILS NFR BLD: 50.6 % (ref 38–73)
NONHDLC SERPL-MCNC: 143 MG/DL
NRBC BLD-RTO: 0 /100 WBC
PLATELET # BLD AUTO: 274 K/UL (ref 150–450)
PMV BLD AUTO: 11 FL (ref 9.2–12.9)
POTASSIUM SERPL-SCNC: 3.7 MMOL/L (ref 3.5–5.1)
PROT SERPL-MCNC: 8.3 G/DL (ref 6–8.4)
RBC # BLD AUTO: 3.82 M/UL (ref 4–5.4)
SODIUM SERPL-SCNC: 142 MMOL/L (ref 136–145)
TRIGL SERPL-MCNC: 65 MG/DL (ref 30–150)
TSH SERPL DL<=0.005 MIU/L-ACNC: 0.93 UIU/ML (ref 0.4–4)
WBC # BLD AUTO: 6.27 K/UL (ref 3.9–12.7)

## 2023-05-16 PROCEDURE — 3008F PR BODY MASS INDEX (BMI) DOCUMENTED: ICD-10-PCS | Mod: CPTII,,, | Performed by: INTERNAL MEDICINE

## 2023-05-16 PROCEDURE — 99214 PR OFFICE/OUTPT VISIT, EST, LEVL IV, 30-39 MIN: ICD-10-PCS | Mod: S$PBB,,, | Performed by: INTERNAL MEDICINE

## 2023-05-16 PROCEDURE — 99214 OFFICE O/P EST MOD 30 MIN: CPT | Mod: PBBFAC,PO | Performed by: INTERNAL MEDICINE

## 2023-05-16 PROCEDURE — 99999 PR PBB SHADOW E&M-EST. PATIENT-LVL IV: CPT | Mod: PBBFAC,,, | Performed by: INTERNAL MEDICINE

## 2023-05-16 PROCEDURE — 83690 ASSAY OF LIPASE: CPT | Performed by: INTERNAL MEDICINE

## 2023-05-16 PROCEDURE — 4010F PR ACE/ARB THEARPY RXD/TAKEN: ICD-10-PCS | Mod: CPTII,,, | Performed by: INTERNAL MEDICINE

## 2023-05-16 PROCEDURE — 3074F SYST BP LT 130 MM HG: CPT | Mod: CPTII,,, | Performed by: INTERNAL MEDICINE

## 2023-05-16 PROCEDURE — 3044F PR MOST RECENT HEMOGLOBIN A1C LEVEL <7.0%: ICD-10-PCS | Mod: CPTII,,, | Performed by: INTERNAL MEDICINE

## 2023-05-16 PROCEDURE — 36415 COLL VENOUS BLD VENIPUNCTURE: CPT | Mod: PO | Performed by: INTERNAL MEDICINE

## 2023-05-16 PROCEDURE — 80053 COMPREHEN METABOLIC PANEL: CPT | Performed by: INTERNAL MEDICINE

## 2023-05-16 PROCEDURE — 4010F ACE/ARB THERAPY RXD/TAKEN: CPT | Mod: CPTII,,, | Performed by: INTERNAL MEDICINE

## 2023-05-16 PROCEDURE — 99214 OFFICE O/P EST MOD 30 MIN: CPT | Mod: S$PBB,,, | Performed by: INTERNAL MEDICINE

## 2023-05-16 PROCEDURE — 3008F BODY MASS INDEX DOCD: CPT | Mod: CPTII,,, | Performed by: INTERNAL MEDICINE

## 2023-05-16 PROCEDURE — 1160F RVW MEDS BY RX/DR IN RCRD: CPT | Mod: CPTII,,, | Performed by: INTERNAL MEDICINE

## 2023-05-16 PROCEDURE — 80061 LIPID PANEL: CPT | Performed by: INTERNAL MEDICINE

## 2023-05-16 PROCEDURE — 84443 ASSAY THYROID STIM HORMONE: CPT | Performed by: INTERNAL MEDICINE

## 2023-05-16 PROCEDURE — 83036 HEMOGLOBIN GLYCOSYLATED A1C: CPT | Performed by: INTERNAL MEDICINE

## 2023-05-16 PROCEDURE — 82150 ASSAY OF AMYLASE: CPT | Performed by: INTERNAL MEDICINE

## 2023-05-16 PROCEDURE — 1159F MED LIST DOCD IN RCRD: CPT | Mod: CPTII,,, | Performed by: INTERNAL MEDICINE

## 2023-05-16 PROCEDURE — 3078F PR MOST RECENT DIASTOLIC BLOOD PRESSURE < 80 MM HG: ICD-10-PCS | Mod: CPTII,,, | Performed by: INTERNAL MEDICINE

## 2023-05-16 PROCEDURE — 3044F HG A1C LEVEL LT 7.0%: CPT | Mod: CPTII,,, | Performed by: INTERNAL MEDICINE

## 2023-05-16 PROCEDURE — 1160F PR REVIEW ALL MEDS BY PRESCRIBER/CLIN PHARMACIST DOCUMENTED: ICD-10-PCS | Mod: CPTII,,, | Performed by: INTERNAL MEDICINE

## 2023-05-16 PROCEDURE — 1159F PR MEDICATION LIST DOCUMENTED IN MEDICAL RECORD: ICD-10-PCS | Mod: CPTII,,, | Performed by: INTERNAL MEDICINE

## 2023-05-16 PROCEDURE — 3074F PR MOST RECENT SYSTOLIC BLOOD PRESSURE < 130 MM HG: ICD-10-PCS | Mod: CPTII,,, | Performed by: INTERNAL MEDICINE

## 2023-05-16 PROCEDURE — 99999 PR PBB SHADOW E&M-EST. PATIENT-LVL IV: ICD-10-PCS | Mod: PBBFAC,,, | Performed by: INTERNAL MEDICINE

## 2023-05-16 PROCEDURE — 85025 COMPLETE CBC W/AUTO DIFF WBC: CPT | Performed by: INTERNAL MEDICINE

## 2023-05-16 PROCEDURE — 3078F DIAST BP <80 MM HG: CPT | Mod: CPTII,,, | Performed by: INTERNAL MEDICINE

## 2023-05-16 RX ORDER — CYCLOBENZAPRINE HCL 10 MG
10 TABLET ORAL DAILY PRN
Qty: 30 TABLET | Refills: 0 | Status: SHIPPED | OUTPATIENT
Start: 2023-05-16 | End: 2023-06-15

## 2023-05-16 RX ORDER — MELOXICAM 15 MG/1
TABLET ORAL
Qty: 30 TABLET | Refills: 0 | Status: SHIPPED | OUTPATIENT
Start: 2023-05-16

## 2023-05-16 NOTE — PROGRESS NOTES
Health Maintenance Due   Topic     Shingles Vaccine (1 of 2) Pt agree to get today    Pneumococcal Vaccines (Age 0-64) (2 - PCV) Pt agree to get today    COVID-19 Vaccine (3 - Booster for Pfizer series) Not offered at this Facility    Foot Exam  Consult pcp    Eye Exam  Consult pcp

## 2023-05-16 NOTE — PROGRESS NOTES
I hereby acknowledge that I am relying upon documentation authored by a medical student working under my supervision and further I hereby attest that I have verified the student documentation or findings by personally re-performing the physical exam and medical decision making activities of the Evaluation and Management service to be billed.  Ilsa Gamez    Subjective:       Patient ID: Torsten Trevizo is a 62 y.o. female.    Chief Complaint: Annual Exam      Ms. Torsten Trevizo is a 63yo F with a PMHx of eHTN, Dyslipidemia, and T2DM who presents for an Annual exam.    Ms. Trevizo usually sees Dr. Yip as her PCP, but was unable to schedule an appointment until November.    Ms. Trevizo reports being compliant with her medications, but did want to discuss the risk of pancreatic cancer when taking Trulicity as one of her friends that is also taking it was recently diagnosed with pancreatic cancer.    Ms. Trevizo's other concerns involve right chest and shoulder pain, lower abdominal pain, and pain and tenderness under the nail of her left hallux. Her right chest and shoulder pain has been present for less than 3 months, comes on suddenly even at rest, and is not exacerbated by activity. No reports of shortness of breath or crushing chest pain. Her abdominal pain is not connected to any change in bowel movements. Her left hallux is tender to touch and is reported to be black under her nail polish.        Review of Systems   Constitutional:  Negative for chills, fatigue and fever.   HENT:  Negative for congestion.    Respiratory:  Negative for cough, chest tightness, shortness of breath and wheezing.    Cardiovascular:  Positive for chest pain. Negative for palpitations.   Gastrointestinal:  Positive for abdominal pain. Negative for constipation, diarrhea, nausea and vomiting.        Hypogastric pain   Musculoskeletal:  Positive for arthralgias and myalgias. Negative for gait problem and joint swelling.        Pain in  right chest.  Pain under nail of left hallux.   Neurological:  Positive for numbness. Negative for tremors, weakness and headaches.        Numbness radiating down right arm to fingers        Past Medical History:   Diagnosis Date    Cataract     Diabetes mellitus     does not take any meds for same    High cholesterol     Hypertension      Past Surgical History:   Procedure Laterality Date    CATARACT EXTRACTION W/  INTRAOCULAR LENS IMPLANT Right 2021    Dr. Ferreira     SECTION      CHOLECYSTECTOMY      EXTRACTION OF CATARACT Left 10/14/2021    Procedure: EXTRACTION, CATARACT;  Surgeon: Mina Ferreira MD;  Location: [facility] OR;  Service: Ophthalmology;  Laterality: Left;  RN Phone Pre Op. 10-6-21. Covid NEGATIVE 10-13-21. Arrival 06:00 am.  [place]    EYE SURGERY Right     catartact    HYSTERECTOMY      Age 53, Fibroids    INTRAOCULAR PROSTHESES INSERTION Right 2021    Procedure: INSERTION, IOL PROSTHESIS;  Surgeon: Mina Ferreira MD;  Location: [facility] OR;  Service: Ophthalmology;  Laterality: Right;    INTRAOCULAR PROSTHESES INSERTION Left 10/14/2021    Procedure: INSERTION, IOL PROSTHESIS;  Surgeon: Mina Ferreira MD;  Location: [facility] OR;  Service: Ophthalmology;  Laterality: Left;    PHACOEMULSIFICATION OF CATARACT Right 2021    Procedure: PHACOEMULSIFICATION, CATARACT;  Surgeon: Mina Ferreira MD;  Location: [facility] OR;  Service: Ophthalmology;  Laterality: Right;  RN Phone Pre Op 21. Covid NEGATIVE ON  21..  Arrival 05:30 am.  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     Employer: [employer]   Tobacco Use    Smoking status: Never    Smokeless tobacco: Never   Substance and Sexual Activity    Alcohol use: Yes     Comment: socially    Drug use: Never       Current Outpatient Medications:     atorvastatin (LIPITOR) 40 MG tablet, Take 1 tablet (40 mg total) by mouth once daily., Disp: 90 tablet, Rfl: 3    blood-glucose meter kit, To check BG 1 times daily, to use with insurance preferred meter, Disp: 1 each, Rfl: 0    dulaglutide (TRULICITY) 0.75 mg/0.5 mL pen injector, Inject 0.75 mg into the skin every 7 days., Disp: 4 pen, Rfl: 11    hydroCHLOROthiazide (HYDRODIURIL) 25 MG tablet, Take 1 tablet (25 mg total) by mouth once daily., Disp: 90 tablet, Rfl: 3    lancets (ONETOUCH DELICA LANCETS) 33 gauge Misc, 1 lancet by Misc.(Non-Drug; Combo Route) route once daily. ONCE DAILY BLOOD SUGAR TESTING; WHICHEVER BRAND COVERED BY INSURANCE, Disp: 30 each, Rfl: 11    lisinopriL (PRINIVIL,ZESTRIL) 40 MG tablet, Take 1 tablet (40 mg total) by mouth once daily., Disp: 90 tablet, Rfl: 3    cyclobenzaprine (FLEXERIL) 10 MG tablet, Take 1 tablet (10 mg total) by mouth daily as needed for Muscle spasms. Never before driving, Disp: 30 tablet, Rfl: 0    meloxicam (MOBIC) 15 MG tablet, Take as needed in the evening, no other NSAIDs, on a full stomach, Disp: 30 tablet, Rfl: 0   Objective:      Vitals:    05/16/23 0854   BP: 112/62   BP Location: Right arm   Patient Position: Sitting   BP Method: Small (Manual)   Pulse: (!) 50   Resp: 18   Temp: 98.2 °F (36.8 °C)   TempSrc: Oral   SpO2: 99%   Weight: 76.8 kg (169 lb 5 oz)   Height: 5' 6" (1.676 m)       Physical Exam  Constitutional:       General: She is not in acute distress.     Appearance: Normal appearance. She is not ill-appearing.   HENT:      " Head: Normocephalic and atraumatic.      Mouth/Throat:      Mouth: Mucous membranes are moist.      Pharynx: Oropharynx is clear.   Eyes:      Conjunctiva/sclera: Conjunctivae normal.   Cardiovascular:      Rate and Rhythm: Normal rate and regular rhythm.      Pulses: Normal pulses.      Heart sounds: Normal heart sounds. No murmur heard.    No friction rub.   Pulmonary:      Effort: Pulmonary effort is normal. No respiratory distress.      Breath sounds: Normal breath sounds. No wheezing.   Musculoskeletal:         General: Tenderness present. No swelling, deformity or signs of injury.        Feet:    Feet:      Left foot:      Skin integrity: Skin integrity normal.      Toenail Condition: Fungal disease present.  Skin:     General: Skin is warm and dry.      Capillary Refill: Capillary refill takes less than 2 seconds.   Neurological:      Mental Status: She is alert and oriented to person, place, and time.   Psychiatric:         Mood and Affect: Mood normal.         Behavior: Behavior normal.         Thought Content: Thought content normal.          Assessment:       1. Essential hypertension    2. Type 2 diabetes mellitus without complication, without long-term current use of insulin    3. Dyslipidemia    4. Onychomycosis    5. Chronic right shoulder pain    6. Need for shingles vaccine    7. Need for pneumococcal vaccine    8. Abdominal pain, generalized        Plan:       Essential hypertension  -     CBC Auto Differential; Future  -     Comprehensive Metabolic Panel; Future; Expected date: 05/16/2023  -     TSH; Future; Expected date: 05/16/2023  -     Currently managed with lisinopril and HCTZ    Type 2 diabetes mellitus without complication, without long-term current use of insulin  -     Hemoglobin A1C; Future; Expected date: 05/16/2023  -     Patient to continue taking Trulicity while labs for pancreas are worked up. Discussed the fact that pancreatic cancer is not part of the black box warning for  Peter.  Will contact Dr. Yip, her usual PCP.     Monitored and treated.    Dyslipidemia  -     Lipid Panel; Future; Expected date: 05/16/2023    Onychomycosis  -     Ambulatory referral/consult to Podiatry; Future; Expected date: 05/23/2023    Chronic right shoulder pain  -     meloxicam (MOBIC) 15 MG tablet; Take as needed in the evening, no other NSAIDs, on a full stomach  Dispense: 30 tablet; Refill: 0  -     cyclobenzaprine (FLEXERIL) 10 MG tablet; Take 1 tablet (10 mg total) by mouth daily as needed for Muscle spasms. Never before driving  Dispense: 30 tablet; Refill: 0    Will try meloxicam and Flexeril.    Need for pneumococcal vaccine  -     Pneumococcal Conjugate Vaccine (20 Valent) (IM)    Abdominal pain, generalized  -     Amylase; Future; Expected date: 05/16/2023  -     LIPASE; Future; Expected date: 05/16/2023    See HPI, will add pancreatic function tests.         Future Appointments   Date Time Provider Department Center   6/5/2023 11:00 AM Sweta Castillo DPM [facility]       Patient note was created using [employer]?.  Any errors in syntax or even information may not have been identified and edited on initial review prior to signing this note.

## 2023-06-05 ENCOUNTER — OFFICE VISIT (OUTPATIENT)
Dept: PODIATRY | Facility: CLINIC | Age: 63
End: 2023-06-05
Payer: MEDICAID

## 2023-06-05 VITALS — WEIGHT: 169.31 LBS | HEIGHT: 66 IN | BODY MASS INDEX: 27.21 KG/M2

## 2023-06-05 DIAGNOSIS — B35.1 PAIN DUE TO ONYCHOMYCOSIS OF TOENAIL: ICD-10-CM

## 2023-06-05 DIAGNOSIS — M79.676 PAIN DUE TO ONYCHOMYCOSIS OF TOENAIL: ICD-10-CM

## 2023-06-05 DIAGNOSIS — E11.9 TYPE 2 DIABETES MELLITUS WITHOUT COMPLICATION, WITHOUT LONG-TERM CURRENT USE OF INSULIN: Primary | ICD-10-CM

## 2023-06-05 DIAGNOSIS — B35.1 ONYCHOMYCOSIS: ICD-10-CM

## 2023-06-05 PROCEDURE — 4010F PR ACE/ARB THEARPY RXD/TAKEN: ICD-10-PCS | Mod: CPTII,,, | Performed by: PODIATRIST

## 2023-06-05 PROCEDURE — 99999 PR PBB SHADOW E&M-EST. PATIENT-LVL III: CPT | Mod: PBBFAC,,, | Performed by: PODIATRIST

## 2023-06-05 PROCEDURE — 1159F PR MEDICATION LIST DOCUMENTED IN MEDICAL RECORD: ICD-10-PCS | Mod: CPTII,,, | Performed by: PODIATRIST

## 2023-06-05 PROCEDURE — 99213 OFFICE O/P EST LOW 20 MIN: CPT | Mod: PBBFAC,PO | Performed by: PODIATRIST

## 2023-06-05 PROCEDURE — 3008F BODY MASS INDEX DOCD: CPT | Mod: CPTII,,, | Performed by: PODIATRIST

## 2023-06-05 PROCEDURE — 99999 PR PBB SHADOW E&M-EST. PATIENT-LVL III: ICD-10-PCS | Mod: PBBFAC,,, | Performed by: PODIATRIST

## 2023-06-05 PROCEDURE — 1159F MED LIST DOCD IN RCRD: CPT | Mod: CPTII,,, | Performed by: PODIATRIST

## 2023-06-05 PROCEDURE — 3044F PR MOST RECENT HEMOGLOBIN A1C LEVEL <7.0%: ICD-10-PCS | Mod: CPTII,,, | Performed by: PODIATRIST

## 2023-06-05 PROCEDURE — 3044F HG A1C LEVEL LT 7.0%: CPT | Mod: CPTII,,, | Performed by: PODIATRIST

## 2023-06-05 PROCEDURE — 4010F ACE/ARB THERAPY RXD/TAKEN: CPT | Mod: CPTII,,, | Performed by: PODIATRIST

## 2023-06-05 PROCEDURE — 3008F PR BODY MASS INDEX (BMI) DOCUMENTED: ICD-10-PCS | Mod: CPTII,,, | Performed by: PODIATRIST

## 2023-06-05 PROCEDURE — 99214 OFFICE O/P EST MOD 30 MIN: CPT | Mod: S$PBB,,, | Performed by: PODIATRIST

## 2023-06-05 PROCEDURE — 99214 PR OFFICE/OUTPT VISIT, EST, LEVL IV, 30-39 MIN: ICD-10-PCS | Mod: S$PBB,,, | Performed by: PODIATRIST

## 2023-06-05 RX ORDER — CICLOPIROX 80 MG/ML
SOLUTION TOPICAL NIGHTLY
Qty: 6.6 ML | Refills: 3 | Status: SHIPPED | OUTPATIENT
Start: 2023-06-05

## 2023-06-05 NOTE — PATIENT INSTRUCTIONS
Kerasal for fungal nails apply daily to all toenails.  Can be found at [facility]

## 2023-06-05 NOTE — PROGRESS NOTES
Subjective:     Patient ID: Torsten Trevizo is a 62 y.o. female.    Chief Complaint: Diabetes Mellitus (5/6/23 Dr Gamez PCP) and Foot Problem (Great toes burn)    Torsten is a 62 y.o. female who presents to the clinic upon referral from Dr. Gamez  for evaluation and treatment of diabetic feet. Torsten has a past medical history of Cataract, Diabetes mellitus, High cholesterol, and Hypertension. Presents for diabetic foot risk assessment.  Reports painful B/L great toenail.     PCP: Diaz Yip MD    Date Last Seen by PCP: per above    Current shoe gear: Tennis shoes    Hemoglobin A1C   Date Value Ref Range Status   05/16/2023 5.8 (H) 4.0 - 5.6 % Final     Comment:     ADA Screening Guidelines:  5.7-6.4%  Consistent with prediabetes  >or=6.5%  Consistent with diabetes    High levels of fetal hemoglobin interfere with the HbA1C  assay. Heterozygous hemoglobin variants (HbS, HgC, etc)do  not significantly interfere with this assay.   However, presence of multiple variants may affect accuracy.     01/30/2023 6.6 (H) 4.0 - 5.6 % Final     Comment:     ADA Screening Guidelines:  5.7-6.4%  Consistent with prediabetes  >or=6.5%  Consistent with diabetes    High levels of fetal hemoglobin interfere with the HbA1C  assay. Heterozygous hemoglobin variants (HbS, HgC, etc)do  not significantly interfere with this assay.   However, presence of multiple variants may affect accuracy.     08/04/2022 6.7 (H) 4.0 - 5.6 % Final     Comment:     ADA Screening Guidelines:  5.7-6.4%  Consistent with prediabetes  >or=6.5%  Consistent with diabetes    High levels of fetal hemoglobin interfere with the HbA1C  assay. Heterozygous hemoglobin variants (HbS, HgC, etc)do  not significantly interfere with this assay.   However, presence of multiple variants may affect accuracy.           Review of Systems   Constitutional: Negative for chills.   Cardiovascular:  Negative for chest pain and claudication.   Respiratory:  Negative for cough.     Skin:  Positive for color change, dry skin and nail changes.   Musculoskeletal:  Positive for joint pain.   Gastrointestinal:  Negative for nausea.   Neurological:  Positive for paresthesias. Negative for numbness.   Psychiatric/Behavioral:  The patient is not nervous/anxious.       Objective:     Physical Exam  Constitutional:       Appearance: She is well-developed.      Comments: Oriented to time, place, and person.   Cardiovascular:      Comments: DP and PT pulses are palpable bilaterally. 3 sec capillary refill time and toes and feet are warm to touch proximally .  There is  hair growth on the feet and toes b/l. There is no edema b/l. No spider veins or varicosities present b/l.     Musculoskeletal:      Comments: Equinus noted b/l ankles with < 10 deg DF noted. MMT 5/5 in DF/PF/Inv/Ev resistance with no reproduction of pain in any direction. Passive range of motion of ankle and pedal joints is painless b/l.     Feet:      Right foot:      Skin integrity: No callus or dry skin.      Left foot:      Skin integrity: No callus or dry skin.   Lymphadenopathy:      Comments: Negative lymphadenopathy bilateral popliteal fossa and tarsal tunnel.   Skin:     Comments: No open lesions, lacerations or wounds noted.Interdigital spaces clean, dry and intact b/l. No erythema noted to b/l foot.    Toenails 1-5 bilaterally are elongated by 2-3 mm, thickened by 2-3 mm, discolored/yellowed, dystrophic, brittle with subungual debris.       Neurological:      Mental Status: She is alert.      Comments: Light touch, proprioception, and sharp/dull sensation are all intact bilaterally. Protective threshold with the Belmont-Wienstein monofilament is intact bilaterally.    Psychiatric:         Behavior: Behavior is cooperative.         Assessment:      Encounter Diagnoses   Name Primary?    Onychomycosis     Pain due to onychomycosis of toenail     Type 2 diabetes mellitus without complication, without long-term current use of  insulin Yes     Plan:     Torsten was seen today for diabetes mellitus and foot problem.    Diagnoses and all orders for this visit:    Type 2 diabetes mellitus without complication, without long-term current use of insulin    Onychomycosis  -     Ambulatory referral/consult to Podiatry    Pain due to onychomycosis of toenail      I counseled the patient on her conditions, their implications and medical management.        At patient's request, I discussed different treatments for toenail fungus. We discussed oral antifungals but I did not recommend them as a first line treatment since the medication is taken internally and can have side effects such as rash, taste disturbances, and liver enzyme elevation. We discussed topical Penlac to be applied daily and removed weekly. Pt. Expresses understanding and would like to try the Penlac. Rx sent to the pharmacy.     - Shoe inspection. Diabetic Foot Education. Patient reminded of the importance of good nutrition and blood sugar control to help prevent podiatric complications of diabetes. Patient instructed on proper foot hygeine. We discussed wearing proper shoe gear, daily foot inspections, never walking without protective shoe gear, caution putting sharp instruments to feet     - Discussed DM foot care:  Wear comfortable, proper fitting shoes. Wash feet daily. Dry well. After drying, apply moisturizer to feet (no lotion to webspaces). Inspect feet daily for skin breaks, blisters, swelling, or redness. Wear cotton socks (preferably white)  Change socks every day. Do NOT walk barefoot. Do NOT use heating pads or warm/hot water soaks     B/L hallux nail trimmed an filed smooth.     F/u one year DM foot exam sooner PRN.

## 2023-06-17 DIAGNOSIS — E78.5 DYSLIPIDEMIA: ICD-10-CM

## 2023-06-17 DIAGNOSIS — I10 ESSENTIAL HYPERTENSION: ICD-10-CM

## 2023-06-17 NOTE — TELEPHONE ENCOUNTER
Care Due:                  Date            Visit Type   Department     Provider  --------------------------------------------------------------------------------                                [facility] FAMILY                              PRIMARY      MED/ INTERNAL  Last Visit: 02-      CARE ([facility])   MED/ PEDS      Diaz Yip  Next Visit: None Scheduled  None         None Found                                                            Last  Test          Frequency    Reason                     Performed    Due Date  --------------------------------------------------------------------------------    Office Visit  12 months..  atorvastatin,              02- 02-                             dulaglutide,                             hydroCHLOROthiazide,                             lisinopriL...............    [employer] Embedded Care Due Messages. Reference number: [id number].   6/17/2023 8:04:52 AM CDT

## 2023-06-18 RX ORDER — ATORVASTATIN CALCIUM 40 MG/1
40 TABLET, FILM COATED ORAL DAILY
Qty: 90 TABLET | Refills: 1 | Status: SHIPPED | OUTPATIENT
Start: 2023-06-18 | End: 2024-03-11 | Stop reason: SDUPTHER

## 2023-06-18 RX ORDER — LISINOPRIL 40 MG/1
40 TABLET ORAL DAILY
Qty: 90 TABLET | Refills: 1 | Status: SHIPPED | OUTPATIENT
Start: 2023-06-18 | End: 2023-07-10

## 2023-06-18 RX ORDER — HYDROCHLOROTHIAZIDE 25 MG/1
25 TABLET ORAL DAILY
Qty: 90 TABLET | Refills: 1 | Status: SHIPPED | OUTPATIENT
Start: 2023-06-18 | End: 2023-07-10

## 2023-06-20 ENCOUNTER — TELEPHONE (OUTPATIENT)
Dept: PODIATRY | Facility: CLINIC | Age: 63
End: 2023-06-20
Payer: MEDICAID

## 2023-06-20 NOTE — TELEPHONE ENCOUNTER
----- Message from Sweta Castillo DPM sent at 6/20/2023 11:56 AM CDT -----  Recommend OTC Kerasal  ----- Message -----  From: Alexis S. Barthelemy, MA  Sent: 6/20/2023  11:52 AM CDT  To: Sweta Castillo DPM    Pharmacy is requesting a P.A for this pt's medication.       ciclopirox (PENLAC) 8 % Soln    Apply topically nightly.   Dispense: 6.6 mL Refills: 3    Start: 6/5/2023     Class: Normal     This order has been released to its destination.  To be filled at: [facility] [place] - [address]

## 2023-06-20 NOTE — TELEPHONE ENCOUNTER
Spoke with pt regarding the P.A for ciclopirox (PENLAC) 8 % Soln. I did inform the pt instead of Penlac  does recommend that she uses OTC Kerasal. Pt did verbally understand.

## 2023-06-23 ENCOUNTER — PATIENT MESSAGE (OUTPATIENT)
Dept: FAMILY MEDICINE | Facility: CLINIC | Age: 63
End: 2023-06-23
Payer: MEDICAID

## 2023-06-26 ENCOUNTER — OFFICE VISIT (OUTPATIENT)
Dept: FAMILY MEDICINE | Facility: CLINIC | Age: 63
End: 2023-06-26
Payer: MEDICAID

## 2023-06-26 VITALS
OXYGEN SATURATION: 97 % | SYSTOLIC BLOOD PRESSURE: 98 MMHG | TEMPERATURE: 98 F | HEART RATE: 51 BPM | HEIGHT: 66 IN | WEIGHT: 166 LBS | DIASTOLIC BLOOD PRESSURE: 60 MMHG | BODY MASS INDEX: 26.68 KG/M2 | RESPIRATION RATE: 16 BRPM

## 2023-06-26 DIAGNOSIS — I10 ESSENTIAL HYPERTENSION: ICD-10-CM

## 2023-06-26 DIAGNOSIS — E11.9 TYPE 2 DIABETES MELLITUS WITHOUT COMPLICATION, WITHOUT LONG-TERM CURRENT USE OF INSULIN: ICD-10-CM

## 2023-06-26 DIAGNOSIS — R42 VERTIGO: Primary | ICD-10-CM

## 2023-06-26 PROCEDURE — 99214 OFFICE O/P EST MOD 30 MIN: CPT | Mod: S$PBB,,, | Performed by: FAMILY MEDICINE

## 2023-06-26 PROCEDURE — 4010F ACE/ARB THERAPY RXD/TAKEN: CPT | Mod: CPTII,,, | Performed by: FAMILY MEDICINE

## 2023-06-26 PROCEDURE — 1159F MED LIST DOCD IN RCRD: CPT | Mod: CPTII,,, | Performed by: FAMILY MEDICINE

## 2023-06-26 PROCEDURE — 99214 OFFICE O/P EST MOD 30 MIN: CPT | Mod: PBBFAC,PN | Performed by: FAMILY MEDICINE

## 2023-06-26 PROCEDURE — 99999 PR PBB SHADOW E&M-EST. PATIENT-LVL IV: CPT | Mod: PBBFAC,,, | Performed by: FAMILY MEDICINE

## 2023-06-26 PROCEDURE — 3074F PR MOST RECENT SYSTOLIC BLOOD PRESSURE < 130 MM HG: ICD-10-PCS | Mod: CPTII,,, | Performed by: FAMILY MEDICINE

## 2023-06-26 PROCEDURE — 1159F PR MEDICATION LIST DOCUMENTED IN MEDICAL RECORD: ICD-10-PCS | Mod: CPTII,,, | Performed by: FAMILY MEDICINE

## 2023-06-26 PROCEDURE — 3044F PR MOST RECENT HEMOGLOBIN A1C LEVEL <7.0%: ICD-10-PCS | Mod: CPTII,,, | Performed by: FAMILY MEDICINE

## 2023-06-26 PROCEDURE — 3008F BODY MASS INDEX DOCD: CPT | Mod: CPTII,,, | Performed by: FAMILY MEDICINE

## 2023-06-26 PROCEDURE — 99214 PR OFFICE/OUTPT VISIT, EST, LEVL IV, 30-39 MIN: ICD-10-PCS | Mod: S$PBB,,, | Performed by: FAMILY MEDICINE

## 2023-06-26 PROCEDURE — 3078F PR MOST RECENT DIASTOLIC BLOOD PRESSURE < 80 MM HG: ICD-10-PCS | Mod: CPTII,,, | Performed by: FAMILY MEDICINE

## 2023-06-26 PROCEDURE — 4010F PR ACE/ARB THEARPY RXD/TAKEN: ICD-10-PCS | Mod: CPTII,,, | Performed by: FAMILY MEDICINE

## 2023-06-26 PROCEDURE — 3074F SYST BP LT 130 MM HG: CPT | Mod: CPTII,,, | Performed by: FAMILY MEDICINE

## 2023-06-26 PROCEDURE — 3008F PR BODY MASS INDEX (BMI) DOCUMENTED: ICD-10-PCS | Mod: CPTII,,, | Performed by: FAMILY MEDICINE

## 2023-06-26 PROCEDURE — 99999 PR PBB SHADOW E&M-EST. PATIENT-LVL IV: ICD-10-PCS | Mod: PBBFAC,,, | Performed by: FAMILY MEDICINE

## 2023-06-26 PROCEDURE — 3078F DIAST BP <80 MM HG: CPT | Mod: CPTII,,, | Performed by: FAMILY MEDICINE

## 2023-06-26 PROCEDURE — 3044F HG A1C LEVEL LT 7.0%: CPT | Mod: CPTII,,, | Performed by: FAMILY MEDICINE

## 2023-06-26 RX ORDER — MECLIZINE HYDROCHLORIDE 25 MG/1
25 TABLET ORAL 3 TIMES DAILY PRN
Qty: 30 TABLET | Refills: 0 | Status: SHIPPED | OUTPATIENT
Start: 2023-06-26 | End: 2023-12-05 | Stop reason: SDUPTHER

## 2023-06-26 RX ORDER — ONDANSETRON 8 MG/1
8 TABLET, ORALLY DISINTEGRATING ORAL EVERY 8 HOURS PRN
Qty: 25 TABLET | Refills: 0 | Status: SHIPPED | OUTPATIENT
Start: 2023-06-26 | End: 2023-12-05 | Stop reason: SDUPTHER

## 2023-06-26 NOTE — PROGRESS NOTES
Chief Complaint   Patient presents with    Nausea    Dizziness    Fatigue       HPI  Torsten Trevizo is a 62 y.o. female with multiple medical diagnoses as listed in the medical history and problem list that presents for evaluation for nausea, dizziness and fatigue    Nausea- she has had one week of dizziness, nausea, and fatigue with intermittent vomiting, hx of DM and BG have been running 117-120 fasting; she has had stomach cramping, her bowel movements are not regular; her nausea started after having some drinks but the symptoms did not improve; she gets dizzy with standing and loses her balance with room spinning, she has been drinking approx five 16 oz bottles of water      ALLERGIES AND MEDICATIONS: updated and reviewed.  Review of patient's allergies indicates:   Allergen Reactions    Oxycodone-acetaminophen Itching     Medication List with Changes/Refills   New Medications    MECLIZINE (ANTIVERT) 25 MG TABLET    Take 1 tablet (25 mg total) by mouth 3 (three) times daily as needed for Dizziness.    ONDANSETRON (ZOFRAN-ODT) 8 MG TBDL    Take 1 tablet (8 mg total) by mouth every 8 (eight) hours as needed.   Current Medications    ATORVASTATIN (LIPITOR) 40 MG TABLET    Take 1 tablet (40 mg total) by mouth once daily.    BLOOD-GLUCOSE METER KIT    To check BG 1 times daily, to use with insurance preferred meter    CICLOPIROX (PENLAC) 8 % SOLN    Apply topically nightly.    DULAGLUTIDE (TRULICITY) 0.75 MG/0.5 ML PEN INJECTOR    Inject 0.75 mg into the skin every 7 days.    HYDROCHLOROTHIAZIDE (HYDRODIURIL) 25 MG TABLET    Take 1 tablet (25 mg total) by mouth once daily.    LANCETS (ONETOUCH DELICA LANCETS) 33 GAUGE MISC    1 lancet by Misc.(Non-Drug; Combo Route) route once daily. ONCE DAILY BLOOD SUGAR TESTING; WHICHEVER BRAND COVERED BY INSURANCE    LISINOPRIL (PRINIVIL,ZESTRIL) 40 MG TABLET    Take 1 tablet (40 mg total) by mouth once daily.    MELOXICAM (MOBIC) 15 MG TABLET    Take as needed in the evening,  "no other NSAIDs, on a full stomach       ROS  Review of Systems   Constitutional:  Positive for fatigue. Negative for chills, diaphoresis, fever and unexpected weight change.   HENT:  Negative for rhinorrhea, sinus pressure, sore throat and tinnitus.    Eyes:  Negative for photophobia and visual disturbance.   Respiratory:  Negative for cough, shortness of breath and wheezing.    Cardiovascular:  Negative for chest pain and palpitations.   Gastrointestinal:  Negative for abdominal pain, blood in stool, constipation, diarrhea, nausea and vomiting.   Genitourinary:  Negative for dysuria, flank pain, frequency and vaginal discharge.   Musculoskeletal:  Negative for arthralgias and joint swelling.   Skin:  Negative for rash.   Neurological:  Positive for dizziness. Negative for speech difficulty, weakness, light-headedness and headaches.   Psychiatric/Behavioral:  Negative for behavioral problems and dysphoric mood.      Physical Exam  Vitals:    06/26/23 0814   BP: 98/60   BP Location: Left arm   Patient Position: Sitting   BP Method: Medium (Manual)   Pulse: (!) 51   Resp: 16   Temp: 98 °F (36.7 °C)   TempSrc: Oral   SpO2: 97%   Weight: 75.3 kg (166 lb 0.1 oz)   Height: 5' 6" (1.676 m)    Body mass index is 26.79 kg/m².  Weight: 75.3 kg (166 lb 0.1 oz)   Height: 5' 6" (167.6 cm)     Physical Exam  Vitals reviewed.   Constitutional:       General: She is not in acute distress.     Appearance: She is well-developed.   HENT:      Head: Normocephalic and atraumatic.      Right Ear: A middle ear effusion is present.      Left Ear: Tympanic membrane normal.   Eyes:      Extraocular Movements:      Right eye: Nystagmus present.   Cardiovascular:      Rate and Rhythm: Normal rate and regular rhythm.      Heart sounds: No murmur heard.    No friction rub. No gallop.   Pulmonary:      Effort: Pulmonary effort is normal. No respiratory distress.      Breath sounds: Normal breath sounds. No wheezing or rales.   Skin:     General: " Skin is warm and dry.      Findings: No rash.   Neurological:      Mental Status: She is alert. Mental status is at baseline.      Cranial Nerves: Cranial nerves 2-12 are intact. No facial asymmetry.   Psychiatric:         Behavior: Behavior normal.         Thought Content: Thought content normal.       Health Maintenance         Date Due Completion Date    Shingles Vaccine (1 of 2) Never done ---    Pneumococcal Vaccines (Age 0-64) (2 - PCV) 01/19/2017 1/19/2016    COVID-19 Vaccine (3 - Pfizer series) 09/06/2021 7/12/2021    Eye Exam 06/24/2022 6/24/2021    Diabetes Urine Screening 08/04/2023 8/4/2022    Influenza Vaccine (Season Ended) 09/01/2023 2/7/2022    Hemoglobin A1c 11/16/2023 5/16/2023    Mammogram 05/09/2024 5/9/2023    Lipid Panel 05/16/2024 5/16/2023    Foot Exam 06/05/2024 6/5/2023 (Done)    Override on 6/5/2023: Done    Low Dose Statin 06/26/2024 6/26/2023    TETANUS VACCINE 02/09/2025 2/9/2015    Colorectal Cancer Screening 11/21/2026 11/21/2016            Health maintenance reviewed and addressed as ordered      ASSESSMENT/PLAN       1. Vertigo  Continue hydration  Trial of meclizine for vertigo symptoms with zofran for nausea to take 30 min prior to eating  - meclizine (ANTIVERT) 25 mg tablet; Take 1 tablet (25 mg total) by mouth 3 (three) times daily as needed for Dizziness.  Dispense: 30 tablet; Refill: 0  - ondansetron (ZOFRAN-ODT) 8 MG TbDL; Take 1 tablet (8 mg total) by mouth every 8 (eight) hours as needed.  Dispense: 25 tablet; Refill: 0    2. Type 2 diabetes mellitus without complication, without long-term current use of insulin hx metformin with GI SE; saxagliptin expensive  BG in normal range    3. Essential hypertension hx of hctz, amlodipine, lisinopril, propranolol  Stable, hold BP medicine until BP rises        Gina Ramirez MD  06/26/2023 8:35 AM        Follow up in about 2 weeks (around 7/10/2023).    No orders of the defined types were placed in this encounter.

## 2023-07-10 ENCOUNTER — OFFICE VISIT (OUTPATIENT)
Dept: FAMILY MEDICINE | Facility: CLINIC | Age: 63
End: 2023-07-10
Payer: MEDICAID

## 2023-07-10 VITALS
SYSTOLIC BLOOD PRESSURE: 100 MMHG | RESPIRATION RATE: 16 BRPM | WEIGHT: 161.63 LBS | OXYGEN SATURATION: 98 % | HEIGHT: 66 IN | TEMPERATURE: 98 F | DIASTOLIC BLOOD PRESSURE: 58 MMHG | BODY MASS INDEX: 25.97 KG/M2 | HEART RATE: 66 BPM

## 2023-07-10 DIAGNOSIS — I95.9 HYPOTENSION, UNSPECIFIED HYPOTENSION TYPE: Primary | ICD-10-CM

## 2023-07-10 DIAGNOSIS — R42 VERTIGO: ICD-10-CM

## 2023-07-10 PROCEDURE — 4010F ACE/ARB THERAPY RXD/TAKEN: CPT | Mod: CPTII,,, | Performed by: FAMILY MEDICINE

## 2023-07-10 PROCEDURE — 3074F SYST BP LT 130 MM HG: CPT | Mod: CPTII,,, | Performed by: FAMILY MEDICINE

## 2023-07-10 PROCEDURE — 99214 OFFICE O/P EST MOD 30 MIN: CPT | Mod: PBBFAC,PN | Performed by: FAMILY MEDICINE

## 2023-07-10 PROCEDURE — 99999 PR PBB SHADOW E&M-EST. PATIENT-LVL IV: ICD-10-PCS | Mod: PBBFAC,,, | Performed by: FAMILY MEDICINE

## 2023-07-10 PROCEDURE — 99214 OFFICE O/P EST MOD 30 MIN: CPT | Mod: S$PBB,,, | Performed by: FAMILY MEDICINE

## 2023-07-10 PROCEDURE — 3008F PR BODY MASS INDEX (BMI) DOCUMENTED: ICD-10-PCS | Mod: CPTII,,, | Performed by: FAMILY MEDICINE

## 2023-07-10 PROCEDURE — 1159F PR MEDICATION LIST DOCUMENTED IN MEDICAL RECORD: ICD-10-PCS | Mod: CPTII,,, | Performed by: FAMILY MEDICINE

## 2023-07-10 PROCEDURE — 3044F HG A1C LEVEL LT 7.0%: CPT | Mod: CPTII,,, | Performed by: FAMILY MEDICINE

## 2023-07-10 PROCEDURE — 3008F BODY MASS INDEX DOCD: CPT | Mod: CPTII,,, | Performed by: FAMILY MEDICINE

## 2023-07-10 PROCEDURE — 1159F MED LIST DOCD IN RCRD: CPT | Mod: CPTII,,, | Performed by: FAMILY MEDICINE

## 2023-07-10 PROCEDURE — 99214 PR OFFICE/OUTPT VISIT, EST, LEVL IV, 30-39 MIN: ICD-10-PCS | Mod: S$PBB,,, | Performed by: FAMILY MEDICINE

## 2023-07-10 PROCEDURE — 4010F PR ACE/ARB THEARPY RXD/TAKEN: ICD-10-PCS | Mod: CPTII,,, | Performed by: FAMILY MEDICINE

## 2023-07-10 PROCEDURE — 3044F PR MOST RECENT HEMOGLOBIN A1C LEVEL <7.0%: ICD-10-PCS | Mod: CPTII,,, | Performed by: FAMILY MEDICINE

## 2023-07-10 PROCEDURE — 3078F PR MOST RECENT DIASTOLIC BLOOD PRESSURE < 80 MM HG: ICD-10-PCS | Mod: CPTII,,, | Performed by: FAMILY MEDICINE

## 2023-07-10 PROCEDURE — 99999 PR PBB SHADOW E&M-EST. PATIENT-LVL IV: CPT | Mod: PBBFAC,,, | Performed by: FAMILY MEDICINE

## 2023-07-10 PROCEDURE — 3078F DIAST BP <80 MM HG: CPT | Mod: CPTII,,, | Performed by: FAMILY MEDICINE

## 2023-07-10 PROCEDURE — 3074F PR MOST RECENT SYSTOLIC BLOOD PRESSURE < 130 MM HG: ICD-10-PCS | Mod: CPTII,,, | Performed by: FAMILY MEDICINE

## 2023-07-10 RX ORDER — HYDROCHLOROTHIAZIDE 12.5 MG/1
12.5 TABLET ORAL DAILY
Qty: 30 TABLET | Refills: 2 | Status: SHIPPED | OUTPATIENT
Start: 2023-07-10 | End: 2023-09-05

## 2023-07-10 RX ORDER — LISINOPRIL 20 MG/1
20 TABLET ORAL DAILY
Qty: 30 TABLET | Refills: 2 | Status: SHIPPED | OUTPATIENT
Start: 2023-07-10 | End: 2023-12-05 | Stop reason: SDUPTHER

## 2023-07-10 NOTE — PROGRESS NOTES
Chief Complaint   Patient presents with    Dizziness       HPI  Torsten Trevizo is a 63 y.o. female with multiple medical diagnoses as listed in the medical history and problem list that presents for follow-up for vertigo    1.vertigo- symptoms have not improved, she has been taking medication, she has had improvement in nausea, however she does not eat because of the trulicity cutting her appetite  2. Low BP-she has not stopped her BP medication as instructed, her readings continue to be low but she is worried about her BP elevating      ALLERGIES AND MEDICATIONS: updated and reviewed.  Review of patient's allergies indicates:   Allergen Reactions    Oxycodone-acetaminophen Itching     Medication List with Changes/Refills   New Medications    HYDROCHLOROTHIAZIDE (HYDRODIURIL) 12.5 MG TAB    Take 1 tablet (12.5 mg total) by mouth once daily.    LISINOPRIL (PRINIVIL,ZESTRIL) 20 MG TABLET    Take 1 tablet (20 mg total) by mouth once daily.   Current Medications    ATORVASTATIN (LIPITOR) 40 MG TABLET    Take 1 tablet (40 mg total) by mouth once daily.    BLOOD-GLUCOSE METER KIT    To check BG 1 times daily, to use with insurance preferred meter    CICLOPIROX (PENLAC) 8 % SOLN    Apply topically nightly.    DULAGLUTIDE (TRULICITY) 0.75 MG/0.5 ML PEN INJECTOR    Inject 0.75 mg into the skin every 7 days.    LANCETS (ONETOUCH DELICA LANCETS) 33 GAUGE MISC    1 lancet by Misc.(Non-Drug; Combo Route) route once daily. ONCE DAILY BLOOD SUGAR TESTING; WHICHEVER BRAND COVERED BY INSURANCE    MECLIZINE (ANTIVERT) 25 MG TABLET    Take 1 tablet (25 mg total) by mouth 3 (three) times daily as needed for Dizziness.    MELOXICAM (MOBIC) 15 MG TABLET    Take as needed in the evening, no other NSAIDs, on a full stomach    ONDANSETRON (ZOFRAN-ODT) 8 MG TBDL    Take 1 tablet (8 mg total) by mouth every 8 (eight) hours as needed.   Discontinued Medications    HYDROCHLOROTHIAZIDE (HYDRODIURIL) 25 MG TABLET    Take 1 tablet (25 mg total)  "by mouth once daily.    LISINOPRIL (PRINIVIL,ZESTRIL) 40 MG TABLET    Take 1 tablet (40 mg total) by mouth once daily.       ROS  Review of Systems   Constitutional:  Negative for chills, diaphoresis, fatigue, fever and unexpected weight change.   HENT:  Negative for rhinorrhea, sinus pressure, sore throat and tinnitus.    Eyes:  Negative for photophobia and visual disturbance.   Respiratory:  Negative for cough, shortness of breath and wheezing.    Cardiovascular:  Negative for chest pain and palpitations.   Gastrointestinal:  Negative for abdominal pain, blood in stool, constipation, diarrhea, nausea and vomiting.   Genitourinary:  Negative for dysuria, flank pain, frequency and vaginal discharge.   Musculoskeletal:  Negative for arthralgias and joint swelling.   Skin:  Negative for rash.   Neurological:  Positive for dizziness. Negative for speech difficulty, weakness, light-headedness and headaches.   Psychiatric/Behavioral:  Negative for behavioral problems and dysphoric mood.      Physical Exam  Vitals:    07/10/23 1306   BP: (!) 100/58   BP Location: Left arm   Patient Position: Sitting   BP Method: Large (Manual)   Pulse: 66   Resp: 16   Temp: 98 °F (36.7 °C)   TempSrc: Oral   SpO2: 98%   Weight: 73.3 kg (161 lb 9.6 oz)   Height: 5' 6" (1.676 m)    Body mass index is 26.08 kg/m².  Weight: 73.3 kg (161 lb 9.6 oz)   Height: 5' 6" (167.6 cm)     Physical Exam  Vitals and nursing note reviewed.   Constitutional:       Appearance: She is well-developed.   Skin:     General: Skin is warm and dry.      Findings: No erythema or rash.   Neurological:      Mental Status: She is alert. Mental status is at baseline.   Psychiatric:         Behavior: Behavior normal.       Health Maintenance         Date Due Completion Date    Shingles Vaccine (1 of 2) Never done ---    Pneumococcal Vaccines (Age 0-64) (2 - PCV) 01/19/2017 1/19/2016    COVID-19 Vaccine (3 - Pfizer series) 09/06/2021 7/12/2021    Eye Exam 06/24/2022 " 6/24/2021    Diabetes Urine Screening 08/04/2023 8/4/2022    Influenza Vaccine (1) 09/01/2023 2/7/2022    Hemoglobin A1c 11/16/2023 5/16/2023    Mammogram 05/09/2024 5/9/2023    Lipid Panel 05/16/2024 5/16/2023    Foot Exam 06/05/2024 6/5/2023 (Done)    Override on 6/5/2023: Done    Low Dose Statin 07/10/2024 7/10/2023    TETANUS VACCINE 02/09/2025 2/9/2015    Colorectal Cancer Screening 11/21/2026 11/21/2016            Health maintenance reviewed and addressed as ordered      ASSESSMENT/PLAN       1. Hypotension, unspecified hypotension type  Decrease BP medicine by 50%  Suspect this as cause of dizziness  - lisinopriL (PRINIVIL,ZESTRIL) 20 MG tablet; Take 1 tablet (20 mg total) by mouth once daily.  Dispense: 30 tablet; Refill: 2  - hydroCHLOROthiazide (HYDRODIURIL) 12.5 MG Tab; Take 1 tablet (12.5 mg total) by mouth once daily.  Dispense: 30 tablet; Refill: 2    2. Vertigo  Refer to ENT in case sx fail to improve  - Ambulatory referral/consult to ENT; Future        Gina Ramirez MD  07/10/2023 1:16 PM        Follow up in about 2 weeks (around 7/24/2023).    Orders Placed This Encounter   Procedures    Ambulatory referral/consult to ENT

## 2023-07-17 ENCOUNTER — TELEPHONE (OUTPATIENT)
Dept: ADMINISTRATIVE | Facility: HOSPITAL | Age: 63
End: 2023-07-17
Payer: MEDICAID

## 2023-08-15 ENCOUNTER — PATIENT MESSAGE (OUTPATIENT)
Dept: ADMINISTRATIVE | Facility: HOSPITAL | Age: 63
End: 2023-08-15
Payer: MEDICAID

## 2023-08-27 ENCOUNTER — PATIENT MESSAGE (OUTPATIENT)
Dept: FAMILY MEDICINE | Facility: CLINIC | Age: 63
End: 2023-08-27
Payer: MEDICAID

## 2023-09-01 NOTE — PROGRESS NOTES
This note was created by combination of typed  and M-Modal dictation.  Transcription errors may be present.  If there are any questions, please contact me.    Assessment and Plan:   Assessment and Plan    Normal physical exam  -colonoscopy due 2026  Last gynecologic visit 08/2021.  Status post total hysterectomy  -     Comprehensive Metabolic Panel; Future; Expected date: 03/03/2024  -     Lipid Panel; Future; Expected date: 03/03/2024  -     Hemoglobin A1C; Future; Expected date: 03/03/2024  -     CBC Without Differential; Future; Expected date: 03/05/2024    Type 2 diabetes mellitus without complication, without long-term current use of insulin hx metformin with GI SE; saxagliptin expensive  -A1c good on Trulicity.  She likes at the Trulicity overall has resulted in weight loss, she overall feels better with less weight  She has not been at this weight since giving birth her daughter about 30 years ago.  Today in the 160 range, she has been in the 190-200 lb range.  Update labs   Needs to update Optometry and I have asked her to stop off at the scheduling desk  -     Comprehensive Metabolic Panel; Future; Expected date: 03/03/2024  -     Lipid Panel; Future; Expected date: 03/03/2024  -     Hemoglobin A1C; Future; Expected date: 03/03/2024  -     CBC Without Differential; Future; Expected date: 03/05/2024    Essential hypertension hx of hctz, amlodipine, lisinopril, propranolol  -with significant weight loss, 40 lb, I suspect her dizziness and lightheadedness are from lower blood pressure requirements.  Not being followed by digital monitoring.  Has room cuff but not checking.  I have asked her to start checking regularly.    Stop the HCTZ   Stay on the lisinopril at current lower dose 20 mg   Start checking blood pressures regularly  Nurse visit 3 weeks for BP check.  If still symptomatic but blood pressure reasonable, cut down the lisinopril to 10    Dyslipidemia hx of crestor, lipitor  -last lipid  profile not quite ideal.  On statin, for now no changes, check with future labs    Need for vaccination for Strep pneumoniae  -     (In Office Administered) Pneumococcal Conjugate Vaccine (20 Valent) (IM)       Medications Discontinued During This Encounter   Medication Reason    hydroCHLOROthiazide (HYDRODIURIL) 12.5 MG Tab        meds sent this encounter:         Follow Up:  Nurse visit 3 weeks for BP check off of HCTZ.  Follow-up 6 months with pre visit labs  Future Appointments   Date Time Provider Department Center   9/5/2023  2:15 PM [facility]EDDIE [facility] [place]   9/18/2023 10:30 AM NURSE, [facility] FAM MED/INT MED [facility] MED [place]   11/24/2023  1:40 PM Diaz Yip MD [facility] [place]   3/11/2024 10:20 AM Diaz Yip MD [facility] [place]         Subjective:   Subjective   Chief Complaint   Patient presents with    Annual Exam       HPI  Torsten is a 63 y.o. female.    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     Employer: [employer]      Social History     Social History Narrative    Not on file       No LMP recorded (lmp unknown). Patient has had a hysterectomy.    Last appointment with this clinic was Visit date not found. Last visit with me 2/7/2022   To summarize last visit and events leading up to today:  Diabetes.  Trulicity possible side effect of fatigue.  Offered to change, she ultimately decided to stay on Trulicity.  Want her to reestablish with digital monitoring.  Hypertension, reestablish with digital monitoring.  Hyperlipidemia statin.    8/2022 labs  CBC mild anemia  CMP normal   Lipid profile not ideal with non , .  On statin.    A1c 6.7.  Urine microalbumin normal   Has been filling atorvastatin 40   Trulicity 0.75  HCTZ    Lisinopril    1/2023  CBC mild anemia  CMP normal   A1c 6.6    ED 03/2023 COVID infection    Was seen at another location in May.  Had noted some abdominal pain.  Lipase was normal.  Amylase normal.  Lipid not ideal but non    A1c 5.8 from 6.6    Had messaged in mid June complaining of nausea and lightheadedness  Was seen at another location.  Vertigo type symptoms, Zofran and meclizine, BP low was instructed to hold BP meds  Had subsequent follow-up at the location 2 weeks later, had continued blood pressure medicines and not stopped it.  Instructions were to cut the dose of her medications in half      Today's visit:  Please note: Chronic medical problems that are stable but are being addressed at this visit may not be listed/documented here, but may be addressed in more detail in the Assessment and Plan section.   Below are salient features of chronic medical conditions, or new/acute conditions and their details.    Dizziness x about a month  Waviness sensation and then everything starts having white tint like she is going to pass out  Was more come and go and now increasing in frequency.  Not sensation of room spinning.   She has a horse, when she is at the barn in the heat seems to make it worse.      Took her BP meds.  The doses were cut in half  With the change in dose - seemed initially to help but seems that over time the symptoms have returned.      The last time she weighed 160 was before her daughter who is 30s now  Typically 190s.   Injects trulicity on Mondays. Some burning and some fatigue with this soon after injection    Notes some constipation worse of late.  Started a fiber supplement. Historically has had constipation    Overall likes the effects of trulicity. A number of family members with DM with complications and she is trying to limit that.  She would like to stay on the trulicity if possible.      Patient Care Team:  Diaz Yip MD as PCP - General (Internal Medicine)  Daren  Sharon as Digital Medicine Health   Darryl Lockett, PharmD as Diabetes Digital Medicine Clinician  Darryl Lcokett, PharmD as Hypertension Digital Medicine Clinician  Diaz Yip MD as Diabetes Digital Medicine Responsible Provider (Internal Medicine)  Diaz Yip MD as Hypertension Digital Medicine Responsible Provider (Internal Medicine)  McI as Hypertension Digital Medicine Contract  McI as Diabetes Digital Medicine Contract  Leslie Irby MA as Care Coordinator      Patient Active Problem List    Diagnosis Date Noted    Nuclear sclerotic cataract of left eye 10/14/2021    Nuclear sclerotic cataract of right eye 09/23/2021    Screening for colon cancer 11/21/2016 colonoscopy normal repeat 10 years. 06/01/2020 11/21/2016 colonoscopy normal repeat 10 years.      Primary insomnia hx of melatonin without relief; hx of trazodone 03/13/2020    GERD (gastroesophageal reflux disease) 01/29/2016    Dyslipidemia hx of crestor, lipitor 10/19/2015    Headache chronic with hx of topamax hx of propranolol 10/19/2015    Essential hypertension hx of hctz, amlodipine, lisinopril, propranolol 11/20/2014    Type 2 diabetes mellitus without complication, without long-term current use of insulin hx metformin with GI SE; saxagliptin expensive 11/20/2014       PAST MEDICAL PROBLEMS, PAST SURGICAL HISTORY: please see relevant portions of the electronic medical record    ALLERGIES AND MEDICATIONS: updated and reviewed.  Medication List with Changes/Refills   Current Medications    ATORVASTATIN (LIPITOR) 40 MG TABLET    Take 1 tablet (40 mg total) by mouth once daily.    BLOOD-GLUCOSE METER KIT    To check BG 1 times daily, to use with insurance preferred meter    CICLOPIROX (PENLAC) 8 % SOLN    Apply topically nightly.    DULAGLUTIDE (TRULICITY) 0.75 MG/0.5 ML PEN INJECTOR    Inject 0.75 mg into the skin every 7 days.    HYDROCHLOROTHIAZIDE (HYDRODIURIL) 12.5 MG TAB    Take 1 tablet (12.5 mg total) by mouth once  "daily.    LANCETS (ONETOUCH DELICA LANCETS) 33 GAUGE MISC    1 lancet by Misc.(Non-Drug; Combo Route) route once daily. ONCE DAILY BLOOD SUGAR TESTING; WHICHEVER BRAND COVERED BY INSURANCE    LISINOPRIL (PRINIVIL,ZESTRIL) 20 MG TABLET    Take 1 tablet (20 mg total) by mouth once daily.    MECLIZINE (ANTIVERT) 25 MG TABLET    Take 1 tablet (25 mg total) by mouth 3 (three) times daily as needed for Dizziness.    MELOXICAM (MOBIC) 15 MG TABLET    Take as needed in the evening, no other NSAIDs, on a full stomach    ONDANSETRON (ZOFRAN-ODT) 8 MG TBDL    Take 1 tablet (8 mg total) by mouth every 8 (eight) hours as needed.         Objective:   Objective   Physical Exam   Vitals:    09/05/23 0801 09/05/23 0820   BP: 132/78 120/62   BP Location:  Left arm   Patient Position:  Sitting   BP Method:  Medium (Manual)   Pulse: (!) 52    Temp: 98.1 °F (36.7 °C)    TempSrc: Oral    SpO2: 96%    Weight: 74.6 kg (164 lb 7.4 oz)    Height: 5' 6" (1.676 m)     Body mass index is 26.55 kg/m².  Weight: 74.6 kg (164 lb 7.4 oz)   Height: 5' 6" (167.6 cm)     Physical Exam  Constitutional:       Appearance: She is well-developed.   HENT:      Right Ear: Tympanic membrane, ear canal and external ear normal.      Left Ear: Tympanic membrane, ear canal and external ear normal.   Eyes:      General: No scleral icterus.     Extraocular Movements: Extraocular movements intact.      Pupils: Pupils are equal, round, and reactive to light.   Neck:      Thyroid: No thyromegaly.   Cardiovascular:      Rate and Rhythm: Normal rate and regular rhythm.      Heart sounds: Normal heart sounds. No murmur heard.  Pulmonary:      Effort: Pulmonary effort is normal.      Breath sounds: Normal breath sounds. No wheezing.   Abdominal:      Palpations: Abdomen is soft. There is no hepatomegaly, splenomegaly or mass.      Tenderness: There is no abdominal tenderness.   Musculoskeletal:         General: No deformity. Normal range of motion.      Cervical back: Neck " supple.      Right lower leg: No edema.      Left lower leg: No edema.   Lymphadenopathy:      Cervical: No cervical adenopathy.   Skin:     General: Skin is warm and dry.      Findings: No rash.      Comments: On exposed skin   Neurological:      Mental Status: She is alert and oriented to person, place, and time.      Deep Tendon Reflexes: Reflexes are normal and symmetric.   Psychiatric:         Behavior: Behavior normal.         Thought Content: Thought content normal.         Judgment: Judgment normal.

## 2023-09-05 ENCOUNTER — OFFICE VISIT (OUTPATIENT)
Dept: FAMILY MEDICINE | Facility: CLINIC | Age: 63
End: 2023-09-05
Payer: MEDICAID

## 2023-09-05 VITALS
SYSTOLIC BLOOD PRESSURE: 120 MMHG | TEMPERATURE: 98 F | OXYGEN SATURATION: 96 % | WEIGHT: 164.44 LBS | BODY MASS INDEX: 26.43 KG/M2 | HEIGHT: 66 IN | HEART RATE: 52 BPM | DIASTOLIC BLOOD PRESSURE: 62 MMHG

## 2023-09-05 DIAGNOSIS — Z23 NEED FOR VACCINATION FOR STREP PNEUMONIAE: ICD-10-CM

## 2023-09-05 DIAGNOSIS — I10 ESSENTIAL HYPERTENSION: ICD-10-CM

## 2023-09-05 DIAGNOSIS — E11.9 TYPE 2 DIABETES MELLITUS WITHOUT COMPLICATION, WITHOUT LONG-TERM CURRENT USE OF INSULIN: ICD-10-CM

## 2023-09-05 DIAGNOSIS — Z00.00 NORMAL PHYSICAL EXAM: Primary | ICD-10-CM

## 2023-09-05 DIAGNOSIS — E78.5 DYSLIPIDEMIA: ICD-10-CM

## 2023-09-05 PROCEDURE — 99999 PR PBB SHADOW E&M-EST. PATIENT-LVL IV: CPT | Mod: PBBFAC,,, | Performed by: INTERNAL MEDICINE

## 2023-09-05 PROCEDURE — 99396 PREV VISIT EST AGE 40-64: CPT | Mod: S$PBB,,, | Performed by: INTERNAL MEDICINE

## 2023-09-05 PROCEDURE — 99214 OFFICE O/P EST MOD 30 MIN: CPT | Mod: PBBFAC,PO | Performed by: INTERNAL MEDICINE

## 2023-09-05 PROCEDURE — 3008F PR BODY MASS INDEX (BMI) DOCUMENTED: ICD-10-PCS | Mod: CPTII,,, | Performed by: INTERNAL MEDICINE

## 2023-09-05 PROCEDURE — 3078F PR MOST RECENT DIASTOLIC BLOOD PRESSURE < 80 MM HG: ICD-10-PCS | Mod: CPTII,,, | Performed by: INTERNAL MEDICINE

## 2023-09-05 PROCEDURE — 1159F PR MEDICATION LIST DOCUMENTED IN MEDICAL RECORD: ICD-10-PCS | Mod: CPTII,,, | Performed by: INTERNAL MEDICINE

## 2023-09-05 PROCEDURE — 4010F ACE/ARB THERAPY RXD/TAKEN: CPT | Mod: CPTII,,, | Performed by: INTERNAL MEDICINE

## 2023-09-05 PROCEDURE — 3078F DIAST BP <80 MM HG: CPT | Mod: CPTII,,, | Performed by: INTERNAL MEDICINE

## 2023-09-05 PROCEDURE — 99396 PR PREVENTIVE VISIT,EST,40-64: ICD-10-PCS | Mod: S$PBB,,, | Performed by: INTERNAL MEDICINE

## 2023-09-05 PROCEDURE — 3008F BODY MASS INDEX DOCD: CPT | Mod: CPTII,,, | Performed by: INTERNAL MEDICINE

## 2023-09-05 PROCEDURE — 1159F MED LIST DOCD IN RCRD: CPT | Mod: CPTII,,, | Performed by: INTERNAL MEDICINE

## 2023-09-05 PROCEDURE — 99999 PR PBB SHADOW E&M-EST. PATIENT-LVL IV: ICD-10-PCS | Mod: PBBFAC,,, | Performed by: INTERNAL MEDICINE

## 2023-09-05 PROCEDURE — 1160F PR REVIEW ALL MEDS BY PRESCRIBER/CLIN PHARMACIST DOCUMENTED: ICD-10-PCS | Mod: CPTII,,, | Performed by: INTERNAL MEDICINE

## 2023-09-05 PROCEDURE — 3044F HG A1C LEVEL LT 7.0%: CPT | Mod: CPTII,,, | Performed by: INTERNAL MEDICINE

## 2023-09-05 PROCEDURE — 3074F SYST BP LT 130 MM HG: CPT | Mod: CPTII,,, | Performed by: INTERNAL MEDICINE

## 2023-09-05 PROCEDURE — 90677 PCV20 VACCINE IM: CPT | Mod: PBBFAC,PO

## 2023-09-05 PROCEDURE — 3044F PR MOST RECENT HEMOGLOBIN A1C LEVEL <7.0%: ICD-10-PCS | Mod: CPTII,,, | Performed by: INTERNAL MEDICINE

## 2023-09-05 PROCEDURE — 99999PBSHW PNEUMOCOCCAL CONJUGATE VACCINE 20-VALENT: Mod: PBBFAC,,,

## 2023-09-05 PROCEDURE — 3074F PR MOST RECENT SYSTOLIC BLOOD PRESSURE < 130 MM HG: ICD-10-PCS | Mod: CPTII,,, | Performed by: INTERNAL MEDICINE

## 2023-09-05 PROCEDURE — 1160F RVW MEDS BY RX/DR IN RCRD: CPT | Mod: CPTII,,, | Performed by: INTERNAL MEDICINE

## 2023-09-05 PROCEDURE — 99999PBSHW PNEUMOCOCCAL CONJUGATE VACCINE 20-VALENT: ICD-10-PCS | Mod: PBBFAC,,,

## 2023-09-05 PROCEDURE — 4010F PR ACE/ARB THEARPY RXD/TAKEN: ICD-10-PCS | Mod: CPTII,,, | Performed by: INTERNAL MEDICINE

## 2023-09-05 NOTE — PATIENT INSTRUCTIONS
Stop the HCTZ  Stay on lisinopril  Please check BP regularly  Top number (systolic) should be between 100 and 140  Bottom number (diastolic) should be between 60 and 90    Stay on trulicity    Nurse visit 3 weeks for BP check; let her know if you are still feeling dizzy/lightheaded    Please schedule optometry follow up for your eyes.

## 2023-09-11 ENCOUNTER — OFFICE VISIT (OUTPATIENT)
Dept: OPTOMETRY | Facility: CLINIC | Age: 63
End: 2023-09-11
Payer: MEDICAID

## 2023-09-11 DIAGNOSIS — H52.4 MYOPIA WITH PRESBYOPIA OF BOTH EYES: ICD-10-CM

## 2023-09-11 DIAGNOSIS — E11.9 TYPE 2 DIABETES MELLITUS WITHOUT RETINOPATHY: Primary | ICD-10-CM

## 2023-09-11 DIAGNOSIS — Z13.5 GLAUCOMA SCREENING: ICD-10-CM

## 2023-09-11 DIAGNOSIS — H52.13 MYOPIA WITH PRESBYOPIA OF BOTH EYES: ICD-10-CM

## 2023-09-11 PROCEDURE — 1160F PR REVIEW ALL MEDS BY PRESCRIBER/CLIN PHARMACIST DOCUMENTED: ICD-10-PCS | Mod: CPTII,,, | Performed by: OPTOMETRIST

## 2023-09-11 PROCEDURE — 1159F PR MEDICATION LIST DOCUMENTED IN MEDICAL RECORD: ICD-10-PCS | Mod: CPTII,,, | Performed by: OPTOMETRIST

## 2023-09-11 PROCEDURE — 99999 PR PBB SHADOW E&M-EST. PATIENT-LVL III: ICD-10-PCS | Mod: PBBFAC,,, | Performed by: OPTOMETRIST

## 2023-09-11 PROCEDURE — 3044F PR MOST RECENT HEMOGLOBIN A1C LEVEL <7.0%: ICD-10-PCS | Mod: CPTII,,, | Performed by: OPTOMETRIST

## 2023-09-11 PROCEDURE — 1160F RVW MEDS BY RX/DR IN RCRD: CPT | Mod: CPTII,,, | Performed by: OPTOMETRIST

## 2023-09-11 PROCEDURE — 92015 PR REFRACTION: ICD-10-PCS | Mod: ,,, | Performed by: OPTOMETRIST

## 2023-09-11 PROCEDURE — 92015 DETERMINE REFRACTIVE STATE: CPT | Mod: ,,, | Performed by: OPTOMETRIST

## 2023-09-11 PROCEDURE — 99213 OFFICE O/P EST LOW 20 MIN: CPT | Mod: PBBFAC | Performed by: OPTOMETRIST

## 2023-09-11 PROCEDURE — 4010F PR ACE/ARB THEARPY RXD/TAKEN: ICD-10-PCS | Mod: CPTII,,, | Performed by: OPTOMETRIST

## 2023-09-11 PROCEDURE — 99999 PR PBB SHADOW E&M-EST. PATIENT-LVL III: CPT | Mod: PBBFAC,,, | Performed by: OPTOMETRIST

## 2023-09-11 PROCEDURE — 2023F PR DILATED RETINAL EXAM W/O EVID OF RETINOPATHY: ICD-10-PCS | Mod: CPTII,,, | Performed by: OPTOMETRIST

## 2023-09-11 PROCEDURE — 92014 COMPRE OPH EXAM EST PT 1/>: CPT | Mod: S$PBB,,, | Performed by: OPTOMETRIST

## 2023-09-11 PROCEDURE — 1159F MED LIST DOCD IN RCRD: CPT | Mod: CPTII,,, | Performed by: OPTOMETRIST

## 2023-09-11 PROCEDURE — 92014 PR EYE EXAM, EST PATIENT,COMPREHESV: ICD-10-PCS | Mod: S$PBB,,, | Performed by: OPTOMETRIST

## 2023-09-11 PROCEDURE — 3044F HG A1C LEVEL LT 7.0%: CPT | Mod: CPTII,,, | Performed by: OPTOMETRIST

## 2023-09-11 PROCEDURE — 4010F ACE/ARB THERAPY RXD/TAKEN: CPT | Mod: CPTII,,, | Performed by: OPTOMETRIST

## 2023-09-11 PROCEDURE — 2023F DILAT RTA XM W/O RTNOPTHY: CPT | Mod: CPTII,,, | Performed by: OPTOMETRIST

## 2023-09-11 RX ORDER — HYDROCHLOROTHIAZIDE 12.5 MG/1
12.5 TABLET ORAL
COMMUNITY
Start: 2023-09-05 | End: 2024-03-11 | Stop reason: ALTCHOICE

## 2023-09-11 NOTE — PROGRESS NOTES
HPI    Patient is here today for a diabetic eye exam. Patient was last seen   05/26/2021. Patient states her vision is blurry. She also has occasional   floaters in her vision. Patient is not experiencing any pain or discomfort   in her eyes today.  Patient is not experiencing light sensitivity or   glaring. Patient is not using any eye drops at this time.    Hemoglobin A1C       Date                     Value               Ref Range             Status                09/05/2023               5.7 (H)             4.0 - 5.6 %           Final              Comment:    ADA Screening Guidelines:  5.7-6.4%  Consistent with   prediabetes  >or=6.5%  Consistent with diabetes    High levels of fetal   hemoglobin interfere with the HbA1C  assay. Heterozygous hemoglobin   variants (HbS, HgC, etc)do  not significantly interfere with this assay.     However, presence of multiple variants may affect accuracy.         05/16/2023               5.8 (H)             4.0 - 5.6 %           Final              Comment:    ADA Screening Guidelines:  5.7-6.4%  Consistent with   prediabetes  >or=6.5%  Consistent with diabetes    High levels of fetal   hemoglobin interfere with the HbA1C  assay. Heterozygous hemoglobin   variants (HbS, HgC, etc)do  not significantly interfere with this assay.     However, presence of multiple variants may affect accuracy.         01/30/2023               6.6 (H)             4.0 - 5.6 %           Final              Comment:    ADA Screening Guidelines:  5.7-6.4%  Consistent with   prediabetes  >or=6.5%  Consistent with diabetes    High levels of fetal   hemoglobin interfere with the HbA1C  assay. Heterozygous hemoglobin   variants (HbS, HgC, etc)do  not significantly interfere with this assay.     However, presence of multiple variants may affect accuracy.    ----------   Last edited by Mary Anderson on 9/11/2023  2:06 PM.            Assessment /Plan     For exam results, see Encounter Report.    Type 2 diabetes  mellitus without retinopathy    Glaucoma screening    Myopia with presbyopia of both eyes      Sp pciol OU--wrote new spex Rx  DM- WITHOUT RETINOPATHY.  Advised yearly DFE     PLAN:    Rtc 1 yr

## 2023-09-13 ENCOUNTER — PATIENT MESSAGE (OUTPATIENT)
Dept: FAMILY MEDICINE | Facility: CLINIC | Age: 63
End: 2023-09-13
Payer: MEDICAID

## 2023-09-13 DIAGNOSIS — D64.9 ANEMIA, UNSPECIFIED TYPE: Primary | ICD-10-CM

## 2023-09-14 ENCOUNTER — LAB VISIT (OUTPATIENT)
Dept: LAB | Facility: HOSPITAL | Age: 63
End: 2023-09-14
Attending: INTERNAL MEDICINE
Payer: MEDICAID

## 2023-09-14 DIAGNOSIS — D64.9 ANEMIA, UNSPECIFIED TYPE: ICD-10-CM

## 2023-09-14 DIAGNOSIS — Z00.00 NORMAL PHYSICAL EXAM: ICD-10-CM

## 2023-09-14 DIAGNOSIS — E11.9 TYPE 2 DIABETES MELLITUS WITHOUT COMPLICATION, WITHOUT LONG-TERM CURRENT USE OF INSULIN: ICD-10-CM

## 2023-09-14 LAB
ALBUMIN SERPL BCP-MCNC: 4.1 G/DL (ref 3.5–5.2)
ALP SERPL-CCNC: 59 U/L (ref 55–135)
ALT SERPL W/O P-5'-P-CCNC: 15 U/L (ref 10–44)
ANION GAP SERPL CALC-SCNC: 11 MMOL/L (ref 8–16)
AST SERPL-CCNC: 15 U/L (ref 10–40)
BILIRUB SERPL-MCNC: 0.5 MG/DL (ref 0.1–1)
BUN SERPL-MCNC: 13 MG/DL (ref 8–23)
CALCIUM SERPL-MCNC: 9.8 MG/DL (ref 8.7–10.5)
CHLORIDE SERPL-SCNC: 110 MMOL/L (ref 95–110)
CHOLEST SERPL-MCNC: 180 MG/DL (ref 120–199)
CHOLEST/HDLC SERPL: 3.1 {RATIO} (ref 2–5)
CO2 SERPL-SCNC: 20 MMOL/L (ref 23–29)
CREAT SERPL-MCNC: 0.8 MG/DL (ref 0.5–1.4)
ERYTHROCYTE [DISTWIDTH] IN BLOOD BY AUTOMATED COUNT: 13.2 % (ref 11.5–14.5)
EST. GFR  (NO RACE VARIABLE): >60 ML/MIN/1.73 M^2
FERRITIN SERPL-MCNC: 193 NG/ML (ref 20–300)
GLUCOSE SERPL-MCNC: 87 MG/DL (ref 70–110)
HCT VFR BLD AUTO: 31.2 % (ref 37–48.5)
HDLC SERPL-MCNC: 58 MG/DL (ref 40–75)
HDLC SERPL: 32.2 % (ref 20–50)
HGB BLD-MCNC: 9.9 G/DL (ref 12–16)
IRON SERPL-MCNC: 87 UG/DL (ref 30–160)
LDLC SERPL CALC-MCNC: 112.6 MG/DL (ref 63–159)
MCH RBC QN AUTO: 30.9 PG (ref 27–31)
MCHC RBC AUTO-ENTMCNC: 31.7 G/DL (ref 32–36)
MCV RBC AUTO: 98 FL (ref 82–98)
NONHDLC SERPL-MCNC: 122 MG/DL
PLATELET # BLD AUTO: 256 K/UL (ref 150–450)
PMV BLD AUTO: 11 FL (ref 9.2–12.9)
POTASSIUM SERPL-SCNC: 3.7 MMOL/L (ref 3.5–5.1)
PROT SERPL-MCNC: 7.5 G/DL (ref 6–8.4)
RBC # BLD AUTO: 3.2 M/UL (ref 4–5.4)
SATURATED IRON: 21 % (ref 20–50)
SODIUM SERPL-SCNC: 141 MMOL/L (ref 136–145)
TOTAL IRON BINDING CAPACITY: 410 UG/DL (ref 250–450)
TRANSFERRIN SERPL-MCNC: 277 MG/DL (ref 200–375)
TRIGL SERPL-MCNC: 47 MG/DL (ref 30–150)
WBC # BLD AUTO: 6.16 K/UL (ref 3.9–12.7)

## 2023-09-14 PROCEDURE — 80053 COMPREHEN METABOLIC PANEL: CPT | Performed by: INTERNAL MEDICINE

## 2023-09-14 PROCEDURE — 36415 COLL VENOUS BLD VENIPUNCTURE: CPT | Mod: PO | Performed by: INTERNAL MEDICINE

## 2023-09-14 PROCEDURE — 82728 ASSAY OF FERRITIN: CPT | Performed by: INTERNAL MEDICINE

## 2023-09-14 PROCEDURE — 85027 COMPLETE CBC AUTOMATED: CPT | Performed by: INTERNAL MEDICINE

## 2023-09-14 PROCEDURE — 84466 ASSAY OF TRANSFERRIN: CPT | Performed by: INTERNAL MEDICINE

## 2023-09-14 PROCEDURE — 80061 LIPID PANEL: CPT | Performed by: INTERNAL MEDICINE

## 2023-09-14 PROCEDURE — 83540 ASSAY OF IRON: CPT | Performed by: INTERNAL MEDICINE

## 2023-09-14 PROCEDURE — 83036 HEMOGLOBIN GLYCOSYLATED A1C: CPT | Performed by: INTERNAL MEDICINE

## 2023-09-15 DIAGNOSIS — D64.9 ANEMIA, UNSPECIFIED TYPE: Primary | ICD-10-CM

## 2023-09-15 LAB
ESTIMATED AVG GLUCOSE: 111 MG/DL (ref 68–131)
HBA1C MFR BLD: 5.5 % (ref 4–5.6)

## 2023-09-18 ENCOUNTER — CLINICAL SUPPORT (OUTPATIENT)
Dept: FAMILY MEDICINE | Facility: CLINIC | Age: 63
End: 2023-09-18
Payer: MEDICAID

## 2023-09-18 VITALS — DIASTOLIC BLOOD PRESSURE: 70 MMHG | OXYGEN SATURATION: 98 % | SYSTOLIC BLOOD PRESSURE: 120 MMHG | HEART RATE: 98 BPM

## 2023-09-18 DIAGNOSIS — I10 ESSENTIAL HYPERTENSION: Primary | ICD-10-CM

## 2023-09-18 PROCEDURE — 99212 OFFICE O/P EST SF 10 MIN: CPT | Mod: PBBFAC,PO

## 2023-09-18 PROCEDURE — 99999 PR PBB SHADOW E&M-EST. PATIENT-LVL II: ICD-10-PCS | Mod: PBBFAC,,,

## 2023-09-18 PROCEDURE — 99999 PR PBB SHADOW E&M-EST. PATIENT-LVL II: CPT | Mod: PBBFAC,,,

## 2023-09-18 NOTE — PROGRESS NOTES
Torsten Trevizo 63 y.o. female is here today for Blood Pressure check.   History of HTN yes.    Review of patient's allergies indicates:   Allergen Reactions    Oxycodone-acetaminophen Itching     Creatinine   Date Value Ref Range Status   09/14/2023 0.8 0.5 - 1.4 mg/dL Final     Sodium   Date Value Ref Range Status   09/14/2023 141 136 - 145 mmol/L Final     Potassium   Date Value Ref Range Status   09/14/2023 3.7 3.5 - 5.1 mmol/L Final   ]  Patient verifies taking blood pressure medications on a regular basis at the same time of the day.     Current Outpatient Medications:     atorvastatin (LIPITOR) 40 MG tablet, Take 1 tablet (40 mg total) by mouth once daily., Disp: 90 tablet, Rfl: 1    blood-glucose meter kit, To check BG 1 times daily, to use with insurance preferred meter (Patient not taking: Reported on 9/5/2023), Disp: 1 each, Rfl: 0    ciclopirox (PENLAC) 8 % Soln, Apply topically nightly. (Patient not taking: Reported on 9/5/2023), Disp: 6.6 mL, Rfl: 3    dulaglutide (TRULICITY) 0.75 mg/0.5 mL pen injector, Inject 0.75 mg into the skin every 7 days., Disp: 4 pen, Rfl: 11    hydroCHLOROthiazide (HYDRODIURIL) 12.5 MG Tab, Take 12.5 mg by mouth., Disp: , Rfl:     lancets (ONETOUCH DELICA LANCETS) 33 gauge Misc, 1 lancet by Misc.(Non-Drug; Combo Route) route once daily. ONCE DAILY BLOOD SUGAR TESTING; WHICHEVER BRAND COVERED BY INSURANCE (Patient not taking: Reported on 9/5/2023), Disp: 30 each, Rfl: 11    lisinopriL (PRINIVIL,ZESTRIL) 20 MG tablet, Take 1 tablet (20 mg total) by mouth once daily., Disp: 30 tablet, Rfl: 2    meclizine (ANTIVERT) 25 mg tablet, Take 1 tablet (25 mg total) by mouth 3 (three) times daily as needed for Dizziness. (Patient not taking: Reported on 9/5/2023), Disp: 30 tablet, Rfl: 0    meloxicam (MOBIC) 15 MG tablet, Take as needed in the evening, no other NSAIDs, on a full stomach (Patient not taking: Reported on 9/5/2023), Disp: 30 tablet, Rfl: 0    ondansetron (ZOFRAN-ODT) 8 MG  HiDL, Take 1 tablet (8 mg total) by mouth every 8 (eight) hours as needed. (Patient not taking: Reported on 9/5/2023), Disp: 25 tablet, Rfl: 0  Does patient have record of home blood pressure readings no.   Last dose of blood pressure medication was taken at this morning.  Patient is asymptomatic.   Complains of none.    Vitals:    09/18/23 1024   BP: 120/70   BP Location: Left arm   Patient Position: Sitting   BP Method: Medium (Manual)   Pulse: 98   SpO2: 98%         Dr. Yip informed of nurse visit.

## 2023-09-21 ENCOUNTER — PATIENT MESSAGE (OUTPATIENT)
Dept: FAMILY MEDICINE | Facility: CLINIC | Age: 63
End: 2023-09-21
Payer: MEDICAID

## 2023-09-21 DIAGNOSIS — E11.9 TYPE 2 DIABETES MELLITUS WITHOUT COMPLICATION, WITHOUT LONG-TERM CURRENT USE OF INSULIN: ICD-10-CM

## 2023-09-21 RX ORDER — DULAGLUTIDE 0.75 MG/.5ML
0.75 INJECTION, SOLUTION SUBCUTANEOUS
Qty: 4 PEN | Refills: 11 | Status: SHIPPED | OUTPATIENT
Start: 2023-09-21 | End: 2024-03-11 | Stop reason: SDUPTHER

## 2023-09-21 RX ORDER — DULAGLUTIDE 0.75 MG/.5ML
0.75 INJECTION, SOLUTION SUBCUTANEOUS
Qty: 4 PEN | Refills: 11 | OUTPATIENT
Start: 2023-09-21

## 2023-09-21 NOTE — TELEPHONE ENCOUNTER
No care due was identified.  [facility] Embedded Care Due Messages. Reference number: [id number].   9/21/2023 7:59:04 AM CDT

## 2023-09-22 ENCOUNTER — PATIENT MESSAGE (OUTPATIENT)
Dept: FAMILY MEDICINE | Facility: CLINIC | Age: 63
End: 2023-09-22
Payer: MEDICAID

## 2023-09-27 ENCOUNTER — TELEPHONE (OUTPATIENT)
Dept: ENDOSCOPY | Facility: HOSPITAL | Age: 63
End: 2023-09-27

## 2023-09-27 ENCOUNTER — CLINICAL SUPPORT (OUTPATIENT)
Dept: ENDOSCOPY | Facility: HOSPITAL | Age: 63
End: 2023-09-27
Attending: INTERNAL MEDICINE
Payer: MEDICAID

## 2023-09-27 DIAGNOSIS — Z12.11 SCREEN FOR COLON CANCER: Primary | ICD-10-CM

## 2023-09-27 DIAGNOSIS — D64.9 ANEMIA, UNSPECIFIED TYPE: ICD-10-CM

## 2023-09-27 NOTE — TELEPHONE ENCOUNTER
Spoke to pt to schedule procedure(s) Colonoscopy/EGD       Physician to perform procedure(s) Dr. MITUL Upton  Date of Procedure (s) 12/19/23  Arrival Time 12:15 PM  Time of Procedure(s) 1:15 PM   Location of Procedure(s) [facility] 2nd Floor  Type of Rx Prep sent to patient: PEG  Instructions provided to patient via [facility]    Patient was informed on the following information and verbalized understanding. Screening questionnaire reviewed with patient and complete. If procedure requires anesthesia, a responsible adult needs to be present to accompany the patient home, patient cannot drive after receiving anesthesia. Appointment details are tentative, especially check-in time. Patient will receive a prep-op call 4 days prior to confirm check-in time for procedure. If applicable the patient should contact their pharmacy to verify Rx for procedure prep is ready for pick-up. Patient was advised to call the scheduling department at [phone] if pharmacy states no Rx is available. Patient was advised to call the endoscopy scheduling department if any questions or concerns arise.      [facility] Scheduling Department

## 2023-11-09 ENCOUNTER — OFFICE VISIT (OUTPATIENT)
Dept: OPHTHALMOLOGY | Facility: CLINIC | Age: 63
End: 2023-11-09
Payer: MEDICAID

## 2023-11-09 ENCOUNTER — PATIENT MESSAGE (OUTPATIENT)
Dept: OPHTHALMOLOGY | Facility: CLINIC | Age: 63
End: 2023-11-09

## 2023-11-09 ENCOUNTER — PATIENT MESSAGE (OUTPATIENT)
Dept: OPTOMETRY | Facility: CLINIC | Age: 63
End: 2023-11-09
Payer: MEDICAID

## 2023-11-09 ENCOUNTER — TELEPHONE (OUTPATIENT)
Dept: OPTOMETRY | Facility: CLINIC | Age: 63
End: 2023-11-09
Payer: MEDICAID

## 2023-11-09 DIAGNOSIS — Z96.1 PSEUDOPHAKIA: ICD-10-CM

## 2023-11-09 DIAGNOSIS — E11.9 DM TYPE 2 WITHOUT RETINOPATHY: ICD-10-CM

## 2023-11-09 DIAGNOSIS — H34.8120 CENTRAL RETINAL VEIN OCCLUSION WITH MACULAR EDEMA OF LEFT EYE: Primary | ICD-10-CM

## 2023-11-09 PROCEDURE — 92014 PR EYE EXAM, EST PATIENT,COMPREHESV: ICD-10-PCS | Mod: S$PBB,,, | Performed by: OPHTHALMOLOGY

## 2023-11-09 PROCEDURE — 1160F RVW MEDS BY RX/DR IN RCRD: CPT | Mod: CPTII,,, | Performed by: OPHTHALMOLOGY

## 2023-11-09 PROCEDURE — 99999 PR PBB SHADOW E&M-EST. PATIENT-LVL III: CPT | Mod: PBBFAC,,, | Performed by: OPHTHALMOLOGY

## 2023-11-09 PROCEDURE — 4010F ACE/ARB THERAPY RXD/TAKEN: CPT | Mod: CPTII,,, | Performed by: OPHTHALMOLOGY

## 2023-11-09 PROCEDURE — 92014 COMPRE OPH EXAM EST PT 1/>: CPT | Mod: S$PBB,,, | Performed by: OPHTHALMOLOGY

## 2023-11-09 PROCEDURE — 1159F MED LIST DOCD IN RCRD: CPT | Mod: CPTII,,, | Performed by: OPHTHALMOLOGY

## 2023-11-09 PROCEDURE — 3044F PR MOST RECENT HEMOGLOBIN A1C LEVEL <7.0%: ICD-10-PCS | Mod: CPTII,,, | Performed by: OPHTHALMOLOGY

## 2023-11-09 PROCEDURE — 1160F PR REVIEW ALL MEDS BY PRESCRIBER/CLIN PHARMACIST DOCUMENTED: ICD-10-PCS | Mod: CPTII,,, | Performed by: OPHTHALMOLOGY

## 2023-11-09 PROCEDURE — 99213 OFFICE O/P EST LOW 20 MIN: CPT | Mod: PBBFAC,PO | Performed by: OPHTHALMOLOGY

## 2023-11-09 PROCEDURE — 4010F PR ACE/ARB THEARPY RXD/TAKEN: ICD-10-PCS | Mod: CPTII,,, | Performed by: OPHTHALMOLOGY

## 2023-11-09 PROCEDURE — 1159F PR MEDICATION LIST DOCUMENTED IN MEDICAL RECORD: ICD-10-PCS | Mod: CPTII,,, | Performed by: OPHTHALMOLOGY

## 2023-11-09 PROCEDURE — 3044F HG A1C LEVEL LT 7.0%: CPT | Mod: CPTII,,, | Performed by: OPHTHALMOLOGY

## 2023-11-09 PROCEDURE — 99999 PR PBB SHADOW E&M-EST. PATIENT-LVL III: ICD-10-PCS | Mod: PBBFAC,,, | Performed by: OPHTHALMOLOGY

## 2023-11-09 NOTE — PROGRESS NOTES
Subjective:       Patient ID: Torsten Trevizo is a 63 y.o. female.    Chief Complaint: Eye Problem    HPI    Urgent    S/p Phaco w/IOL OS- 10/14/2021  S/p Phaco w/IOL OD- 9/23/2021    No eyedrops    Pt here for urgent visit.  Pt states she has had decreased VA OS x 4 days   and it seems to be getting worse.  Pt states when she covers OD her   central VA OS is black. Pt states she has been rinsing OS but no change   noticed in VA OS.  Pt states floaters OS x 2 days.  Pt denies flashes or   eye pain OU.   Last edited by Krysten Carr MA on 11/9/2023  2:27 PM.             Assessment:       1. Central retinal vein occlusion with macular edema of left eye    2. DM type 2 without retinopathy    3. Pseudophakia        Plan:       CRVO with mac edema OS-Needs Retina eval/tx.  DM-No NPDR OU.      Refer to Dr Hidalgo.

## 2023-11-09 NOTE — TELEPHONE ENCOUNTER
----- Message from Gary Jane sent at 11/9/2023 11:27 AM CST -----  Contact: [phone]  Pt is calling and states it feels like something is in her left eye for the past week. She is also having an issue where her vision is dimming in the left eye as well, to the point where if she covers there right eye she can't see at all. Please call back to further assist.

## 2023-11-15 ENCOUNTER — OFFICE VISIT (OUTPATIENT)
Dept: OPHTHALMOLOGY | Facility: CLINIC | Age: 63
End: 2023-11-15
Attending: OPHTHALMOLOGY
Payer: MEDICAID

## 2023-11-15 DIAGNOSIS — H34.8120 CENTRAL RETINAL VEIN OCCLUSION WITH MACULAR EDEMA OF LEFT EYE: Primary | ICD-10-CM

## 2023-11-15 PROCEDURE — 92134 CPTRZ OPH DX IMG PST SGM RTA: CPT | Mod: PBBFAC,PO | Performed by: OPHTHALMOLOGY

## 2023-11-15 PROCEDURE — 4010F PR ACE/ARB THEARPY RXD/TAKEN: ICD-10-PCS | Mod: CPTII,,, | Performed by: OPHTHALMOLOGY

## 2023-11-15 PROCEDURE — 4010F ACE/ARB THERAPY RXD/TAKEN: CPT | Mod: CPTII,,, | Performed by: OPHTHALMOLOGY

## 2023-11-15 PROCEDURE — 99999 PR PBB SHADOW E&M-EST. PATIENT-LVL III: ICD-10-PCS | Mod: PBBFAC,,, | Performed by: OPHTHALMOLOGY

## 2023-11-15 PROCEDURE — 1159F MED LIST DOCD IN RCRD: CPT | Mod: CPTII,,, | Performed by: OPHTHALMOLOGY

## 2023-11-15 PROCEDURE — 92134 POSTERIOR SEGMENT OCT RETINA (OCULAR COHERENCE TOMOGRAPHY)-BOTH EYES: ICD-10-PCS | Mod: 26,S$PBB,, | Performed by: OPHTHALMOLOGY

## 2023-11-15 PROCEDURE — 1160F RVW MEDS BY RX/DR IN RCRD: CPT | Mod: CPTII,,, | Performed by: OPHTHALMOLOGY

## 2023-11-15 PROCEDURE — 3044F HG A1C LEVEL LT 7.0%: CPT | Mod: CPTII,,, | Performed by: OPHTHALMOLOGY

## 2023-11-15 PROCEDURE — 67028 INJECTION EYE DRUG: CPT | Mod: PBBFAC,PO,LT | Performed by: OPHTHALMOLOGY

## 2023-11-15 PROCEDURE — 3044F PR MOST RECENT HEMOGLOBIN A1C LEVEL <7.0%: ICD-10-PCS | Mod: CPTII,,, | Performed by: OPHTHALMOLOGY

## 2023-11-15 PROCEDURE — 99999 PR PBB SHADOW E&M-EST. PATIENT-LVL III: CPT | Mod: PBBFAC,,, | Performed by: OPHTHALMOLOGY

## 2023-11-15 PROCEDURE — 1160F PR REVIEW ALL MEDS BY PRESCRIBER/CLIN PHARMACIST DOCUMENTED: ICD-10-PCS | Mod: CPTII,,, | Performed by: OPHTHALMOLOGY

## 2023-11-15 PROCEDURE — 99213 OFFICE O/P EST LOW 20 MIN: CPT | Mod: PBBFAC,PO | Performed by: OPHTHALMOLOGY

## 2023-11-15 PROCEDURE — 1159F PR MEDICATION LIST DOCUMENTED IN MEDICAL RECORD: ICD-10-PCS | Mod: CPTII,,, | Performed by: OPHTHALMOLOGY

## 2023-11-15 PROCEDURE — 67028 INJECTION EYE DRUG: CPT | Mod: S$PBB,LT,, | Performed by: OPHTHALMOLOGY

## 2023-11-15 PROCEDURE — 99214 OFFICE O/P EST MOD 30 MIN: CPT | Mod: 25,S$PBB,, | Performed by: OPHTHALMOLOGY

## 2023-11-15 PROCEDURE — 67028 PR INJECT INTRAVITREAL PHARMCOLOGIC: ICD-10-PCS | Mod: S$PBB,LT,, | Performed by: OPHTHALMOLOGY

## 2023-11-15 PROCEDURE — 99214 PR OFFICE/OUTPT VISIT, EST, LEVL IV, 30-39 MIN: ICD-10-PCS | Mod: 25,S$PBB,, | Performed by: OPHTHALMOLOGY

## 2023-11-15 RX ADMIN — Medication 1.25 MG: at 03:11

## 2023-11-15 NOTE — PROGRESS NOTES
Subjective:       Patient ID: Torsten Trevizo is a 63 y.o. female      Chief Complaint   Patient presents with    Concerns About Ocular Health    Eye Pain     History of Present Illness  HPI    Urgent -Crvo Dr. Ferreira     Pt states the beginning of the month she woke up and her vision on her OS   was very blurry like vision was coming in and out. She saw Dr. Ferreira   on 11/09/2023. She states yesterday pretty much all of the vision was   blacked out on her OS and now she can feel slight pain. She rates her pain   today a 7. She has not taken anything for pain.       +Flashes  OS   -Floaters   +Pain OS 07  Last edited by Mushtaq Hidalgo MD on 11/15/2023  2:50 PM.        Imaging:    See report    Assessment/Plan:     1. Central retinal vein occlusion with macular edema of left eye  Discussed pathology in detail with eye model    Recommend Avastin OS today.  Discussed RBA inc endophthalmitis  Discussed injection protocol, TREX, and visual expectations  Pt wishes to have Rx        Follow up in about 1 month (around 12/15/2023), or if symptoms worsen or fail to improve, for Injection Left eye, OCT and INJECTION ONLY, Avastin.       Patient identified.  Timeout performed.    Risks, benefits, and alternatives to treatment were discussed in detail with the patient, including bleeding/infection (endophthalmitis)/etc.  The patient voiced understanding and wished to proceed with the procedure.  See separate consent form.    Injection Procedure Note:  Diagnosis: CRVO Left Eye    Topical Proparacaine drop placed then topical 5% Betadine  Sterile gloves used, and sterile lid speculum placed.  5% Betadine placed at injection site again prior to injection.  Avastin 1.25mg in 0.05cc Injected inferotemporally 3.5-4mm posterior to the limbus.  Complications: None  Va at least CF at 5 feet post injection.  Retina, ONH, IOP normal after injection.    Followup as above.  Patient should return immediately PRN.  Retinal  Detachment and Endophthalmitis precautions given.

## 2023-12-05 DIAGNOSIS — R42 VERTIGO: ICD-10-CM

## 2023-12-05 DIAGNOSIS — I95.9 HYPOTENSION, UNSPECIFIED HYPOTENSION TYPE: ICD-10-CM

## 2023-12-06 RX ORDER — LISINOPRIL 20 MG/1
20 TABLET ORAL DAILY
Qty: 90 TABLET | Refills: 3 | Status: SHIPPED | OUTPATIENT
Start: 2023-12-06 | End: 2024-03-08 | Stop reason: SDUPTHER

## 2023-12-06 NOTE — TELEPHONE ENCOUNTER
No care due was identified.  [facility] Embedded Care Due Messages. Reference number: [id number].   12/05/2023 7:00:32 PM CST

## 2023-12-06 NOTE — TELEPHONE ENCOUNTER
No care due was identified.  [facility] Embedded Care Due Messages. Reference number: [id number].   12/05/2023 6:59:31 PM CST

## 2023-12-06 NOTE — TELEPHONE ENCOUNTER
Refill Decision Note   Torsten Trevizo  is requesting a refill authorization.  Brief Assessment and Rationale for Refill:  Approve     Medication Therapy Plan:       Medication Reconciliation Completed: No   Comments:     No Care Gaps recommended.     Note composed:2:42 PM 12/06/2023

## 2023-12-06 NOTE — TELEPHONE ENCOUNTER
Refill Routing Note   Medication(s) are not appropriate for processing by [facility] for the following reason(s):        Drug-disease interaction:  lisinopriL and Hypotension, unspecified hypotension type     [facility] action(s):  Defer      Medication Therapy Plan:       Pharmacist review requested: Yes     Appointments  past 12m or future 3m with PCP    Date Provider   Last Visit   7/10/2023 Gina Ramirez MD   Next Visit   12/5/2023 Gina Ramirez MD   ED visits in past 90 days: 0        Note composed:2:41 PM 12/06/2023

## 2023-12-06 NOTE — TELEPHONE ENCOUNTER
No care due was identified.  [facility] Embedded Care Due Messages. Reference number: [id number].   12/05/2023 7:01:18 PM CST

## 2023-12-08 RX ORDER — MECLIZINE HYDROCHLORIDE 25 MG/1
25 TABLET ORAL 3 TIMES DAILY PRN
Qty: 30 TABLET | Refills: 0 | Status: SHIPPED | OUTPATIENT
Start: 2023-12-08

## 2023-12-08 RX ORDER — ONDANSETRON 8 MG/1
8 TABLET, ORALLY DISINTEGRATING ORAL EVERY 8 HOURS PRN
Qty: 25 TABLET | Refills: 0 | Status: SHIPPED | OUTPATIENT
Start: 2023-12-08

## 2023-12-12 ENCOUNTER — TELEPHONE (OUTPATIENT)
Dept: ENDOSCOPY | Facility: HOSPITAL | Age: 63
End: 2023-12-12
Payer: MEDICAID

## 2023-12-12 NOTE — TELEPHONE ENCOUNTER
EGD/Colonoscopy Instructions sent to patient's email ([email or web address] ):        Instructions for EGD / Colonoscopy  PEG (polyethylene glycol) - Common Brands: Golytely, Colyte, Nulytely, Gavilyte, Trilyte    Date of procedure: 12/19/23 - Arrive at 12:15 PM    Location of Department:   [facility] [address] [place] [address]  Take the Elevators to 2nd Floor Endoscopy Procedural Area    As soon as possible:   your prep from pharmacy and over the counter DULCOLAX LAXATIVE TABLETS     On the ENTIRE day before your procedure:  You may ONLY have clear liquids, such as:  Water  Sports drinks (Gatorade, Power-Aid)  Coffee or tea (no cream or nondairy creamer)  Clear juices without pulp (apple, white grape)  Gelatin desserts (no fruit or toppings)  Clear soda (Sprite, Coke, ginger ale)  Chicken broth (until 12 midnight the night before procedure)    What You CANNOT do:   Do not EAT solid food.  Do not drink milk or anything colored red.  Do not drink alcohol.  Do not take oral medications within 1 hour of starting each dose of prep.  No gum chewing or candy morning of procedure.                       Note:   Disregard the insert instructions from pharmacy.  Medication taken by mouth may not be absorbed properly when taken within 1 hour before the start of each dose of PEG Bowel Prep.  It is not uncommon to experience some abdominal cramping, nausea and/or vomiting when taking the prep. If you have nausea and/or vomiting while taking the prep, stop drinking for 20 to 30 minutes then continue.    How to take prep:    PEG Bowel Prep is a (2-day) prep.    One (1) bottle of prep is required for a complete preparation for colonoscopy. You must drink water with each dose of prep, and additional water after each dose.    DOSE 1--Day Before Colonoscopy 12/18/23     Drink at least 6 to 8 glasses of clear liquids from time you wake up until you begin your prep, and then continue until bedtime  to avoid dehydration.     12:00 pm (NOON) Mix your entire container of prep with lukewarm water and refrigerate. Take four (4) Dulcolax (Bisacodyl) tablets with at least 8 ounces or more of clear liquids.       6:00 pm:  You must complete Steps 1 and 2 below before going to bed:    Step 1- Drink half of the liquid in the container within one (1) hour.   Step 2- Refrigerate the remaining half of the liquid for dose 2.                       DOSE 2--Day of the Colonoscopy 12/19/23 at 7-8 AM    For this dose, repeat Step 1 shown above using the remaining half of the liquid prep.   You may continue drinking water/clear liquids until 4 hours before your colonoscopy (9:15 AM).    For more information about your procedure, please watch this informational video. It is important to watch this animated consent video prior to your arrival. If you haven't watched the video prior to arriving, you will be asked to watch it during admission, which can cause delays.     3 Options for viewing:  Using a computer:  Press and hold the control tab (Ctrl) and left mouse click to follow link:  Colonoscopy Instructional Video  Type link address into your web browser's address bar:  [email or web address]  Using a mobile phone: tap on web address/link.             IMPORTANT INFORMATION TO KNOW BEFORE YOUR PROCEDURE  [facility] 2nd Floor    If your procedure requires the administration of anesthesia, it is necessary for a responsible adult to drive you home. (Medical Transportation, Uber, Lyft, Taxi, etc. may ONLY be used if a responsible adult is present to accompany you home.  The responsible adult CAN'T be the  of the service).   person must be available to return to pick you up within 15 minutes of being notified of discharge.    Due to the limited socially distant seating in our waiting room, please limit your guest to one person. If someone accompanies you for this procedure into  the facility:  Consider having them proceed to an area that is socially distant other than the lobby until a member of the medical team contacts them to provide an update after the procedure.   Also, please consider being dropped off and picked up from the facility.    Please bring a picture ID, insurance card, & copayment    Take Medications as directed below:    If you are taking any injectable medication (s) for weight loss and/or diabetes  weekly, please hold for 7 days prior to your scheduled procedure, starting on 12/12/23.    If you begin taking any blood thinning medications or injectable weight loss/diabetes medications (other than insulin), please contact the endoscopy scheduling department listed below as soon as possible.    If you are diabetic see the attached instruction sheet regarding your medication.     If you take HEART, BLOOD PRESSURE, SEIZURE, PAIN, LUNG (including inhalers/nebulizers), ANTI-REJECTION (transplant patients), or PSYCHIATRIC medications, please take at your regular times with a sip of water, unless otherwise directed by the scheduling nurse.     Important contact information:    Endoscopy [phone] Hours of operation Monday-Friday 8:00-4:30pm.    Questions about insurance or financial obligations call [phone] or [phone].    After hours questions requiring immediate assistance, contact [facility] nurse line at [phone] or [phone].   NOTE:     On occasion, unforeseen circumstances may cause a delay in your procedure start time. We respect your time and appreciate your patience during these circumstances.

## 2023-12-13 ENCOUNTER — PROCEDURE VISIT (OUTPATIENT)
Dept: OPHTHALMOLOGY | Facility: CLINIC | Age: 63
End: 2023-12-13
Attending: OPHTHALMOLOGY
Payer: MEDICAID

## 2023-12-13 DIAGNOSIS — H34.8120 CENTRAL RETINAL VEIN OCCLUSION WITH MACULAR EDEMA OF LEFT EYE: Primary | ICD-10-CM

## 2023-12-13 PROCEDURE — 92134 POSTERIOR SEGMENT OCT RETINA (OCULAR COHERENCE TOMOGRAPHY)-BOTH EYES: ICD-10-PCS | Mod: 26,S$PBB,, | Performed by: OPHTHALMOLOGY

## 2023-12-13 PROCEDURE — 99499 UNLISTED E&M SERVICE: CPT | Mod: S$PBB,,, | Performed by: OPHTHALMOLOGY

## 2023-12-13 PROCEDURE — 67028 INJECTION EYE DRUG: CPT | Mod: S$PBB,LT,, | Performed by: OPHTHALMOLOGY

## 2023-12-13 PROCEDURE — 67028 PR INJECT INTRAVITREAL PHARMCOLOGIC: ICD-10-PCS | Mod: S$PBB,LT,, | Performed by: OPHTHALMOLOGY

## 2023-12-13 PROCEDURE — 92134 CPTRZ OPH DX IMG PST SGM RTA: CPT | Mod: PBBFAC,PO | Performed by: OPHTHALMOLOGY

## 2023-12-13 PROCEDURE — 67028 INJECTION EYE DRUG: CPT | Mod: PBBFAC,PO,LT | Performed by: OPHTHALMOLOGY

## 2023-12-13 PROCEDURE — 99499 NO LOS: ICD-10-PCS | Mod: S$PBB,,, | Performed by: OPHTHALMOLOGY

## 2023-12-13 RX ADMIN — Medication 1.25 MG: at 02:12

## 2023-12-13 NOTE — PROGRESS NOTES
Subjective:       Patient ID: Torsten Trevizo is a 63 y.o. female      Chief Complaint   Patient presents with    Concerns About Ocular Health     History of Present Illness  HPI    1 mo OCT/AVastin OS   DLS-11/15/2023 Dr. Hidalgo     Pt sts vision continues to decline unable to see out of RX glasses.   Denies any eye pain.   (-)Flashes (+)Floaters sees spots and shadows   (+)Photophobia  (+)Glare    No gtts   Last edited by Mushtaq Hidalgo MD on 12/13/2023  2:46 PM.        Imaging:    See report    Assessment/Plan:     1. Central retinal vein occlusion with macular edema of left eye  ME much improved but vision still poor.  Inner retinal hyperreflectivity c/w ischemia  Discussed guarded vis potential     Avastin OS today and eval 1 month.  If ME completely controlled will decide on further Rx or obs  Return 1 month to reassess     Discussed RBA inc endophthalmitis  Discussed injection protocol, TREX, and visual expectations  Pt wishes to have Rx        Follow up in about 1 month (around 1/13/2024), or if symptoms worsen or fail to improve, for OCT and INJECTION ONLY, Injection Left eye, Avastin.       Patient identified.  Timeout performed.    Risks, benefits, and alternatives to treatment were discussed in detail with the patient, including bleeding/infection (endophthalmitis)/etc.  The patient voiced understanding and wished to proceed with the procedure.  See separate consent form.    Injection Procedure Note:  Diagnosis: CRVO Left Eye    Topical Proparacaine drop placed then topical 5% Betadine  Sterile gloves used, and sterile lid speculum placed.  5% Betadine placed at injection site again prior to injection.  Avastin 1.25mg in 0.05cc Injected inferotemporally 3.5-4mm posterior to the limbus.  Complications: None  Va at least CF at 5 feet post injection.  Retina, ONH, IOP normal after injection.    Followup as above.  Patient should return immediately PRN.  Retinal Detachment and Endophthalmitis  precautions given.

## 2023-12-15 ENCOUNTER — PATIENT MESSAGE (OUTPATIENT)
Dept: GASTROENTEROLOGY | Facility: CLINIC | Age: 63
End: 2023-12-15
Payer: MEDICAID

## 2023-12-15 ENCOUNTER — TELEPHONE (OUTPATIENT)
Dept: GASTROENTEROLOGY | Facility: CLINIC | Age: 63
End: 2023-12-15
Payer: MEDICAID

## 2023-12-15 NOTE — TELEPHONE ENCOUNTER
"Called Patient regarding questions concerning ENDO procedure. Pt stated " I just got a call from someone". Asked pt did she have any other questions or concerns. Pt stated " No I don't thanks"  "

## 2023-12-17 ENCOUNTER — NURSE TRIAGE (OUTPATIENT)
Dept: ADMINISTRATIVE | Facility: CLINIC | Age: 63
End: 2023-12-17
Payer: MEDICAID

## 2023-12-17 NOTE — TELEPHONE ENCOUNTER
Reason for Disposition   [1] Caller has NON-URGENT question AND [2] triager unable to answer question    Additional Information   Negative: Shock suspected (e.g., cold/pale/clammy skin, too weak to stand, low BP, rapid pulse)   Negative: Difficult to awaken or acting confused (e.g., disoriented, slurred speech)   Negative: Passed out (i.e., lost consciousness, collapsed and was not responding)   Negative: Sounds like a life-threatening emergency to the triager   Negative: SEVERE abdomen pain (e.g., excruciating)   Negative: SEVERE rectal bleeding (large blood clots; on and off, or constant bleeding)   Negative: SEVERE dizziness (e.g., unable to stand, requires support to walk, feels like passing out now)   Negative: SEVERE vomiting (e.g., 6 or more times/day)   Negative: [1] MODERATE rectal bleeding (small blood clots, passing blood without stool, or toilet water turns red) AND [2] more than once   Negative: [1] MILD-MODERATE abdomen pain AND [2] constant AND [3] present > 2 hours   Negative: [1] Drinking very little AND [2] dehydration suspected (e.g., no urine > 12 hours, very dry mouth, very lightheaded)   Negative: Patient sounds very sick or weak to the triager   Negative: Fever > 100.4 F (38.0 C)   Negative: [1] Abdominal bloating, cramping, nausea, or vomiting while drinking bowel prep AND [2] CANNOT finish bowel prep AND [3] has tried recommended Care Advice   Negative: [1] Caller has URGENT question or concern AND [2] triager unable to answer question   Negative: [1] MILD-MODERATE abdomen pain (e.g., does not interfere with normal activities) AND [2] pain comes and goes (cramps) [3] lasting > 48 hours   Negative: [1] MILD to MODERATE vomiting (e.g., 1 to 5 times a day) AND [2] lasting > 24 hours   Negative: Any rectal bleeding occurring > 24 hours after colonoscopy    Protocols used: Colonoscopy Symptoms and [id number]-YS  Scope on tues. pt has med to start cruz. Pt asking about timing of when to start  prep. Offered instructions from note dated 12/12.

## 2023-12-18 ENCOUNTER — ANESTHESIA EVENT (OUTPATIENT)
Dept: ENDOSCOPY | Facility: HOSPITAL | Age: 63
End: 2023-12-18
Payer: MEDICAID

## 2023-12-18 NOTE — ANESTHESIA PREPROCEDURE EVALUATION
2023  Torsten Trevizo is a 63 y.o., female.  Past Medical History:   Diagnosis Date    Cataract     Diabetes mellitus     does not take any meds for same    High cholesterol     Hypertension      Past Surgical History:   Procedure Laterality Date    CATARACT EXTRACTION W/  INTRAOCULAR LENS IMPLANT Right 2021    Dr. Ferreira     SECTION      CHOLECYSTECTOMY      EXTRACTION OF CATARACT Left 10/14/2021    Procedure: EXTRACTION, CATARACT;  Surgeon: Mina Ferreira MD;  Location: [facility] OR;  Service: Ophthalmology;  Laterality: Left;  RN Phone Pre Op. 10-6-21. Covid NEGATIVE 10-13-21. Arrival 06:00 am.  [place]    EYE SURGERY Right     catartact    HYSTERECTOMY      Age 53, Fibroids    INTRAOCULAR PROSTHESES INSERTION Right 2021    Procedure: INSERTION, IOL PROSTHESIS;  Surgeon: Mina Ferreira MD;  Location: [facility] OR;  Service: Ophthalmology;  Laterality: Right;    INTRAOCULAR PROSTHESES INSERTION Left 10/14/2021    Procedure: INSERTION, IOL PROSTHESIS;  Surgeon: Mina Ferreira MD;  Location: [facility] OR;  Service: Ophthalmology;  Laterality: Left;    PHACOEMULSIFICATION OF CATARACT Right 2021    Procedure: PHACOEMULSIFICATION, CATARACT;  Surgeon: Mina Ferreira MD;  Location: [facility] OR;  Service: Ophthalmology;  Laterality: Right;  RN Phone Pre Op 21. Covid NEGATIVE ON  21..  Arrival 05:30 am.  C A         Pre-op Assessment    I have reviewed the Patient Summary Reports.     I have reviewed the Nursing Notes.    I have reviewed the Medications.     Review of Systems  Hematology/Oncology:  Hematology Normal   Oncology Normal                                   EENT/Dental:  EENT/Dental Normal           Cardiovascular:     Hypertension                                        Pulmonary:  Pulmonary Normal                       Renal/:  Renal/ Normal                  Hepatic/GI:     GERD             Musculoskeletal:  Musculoskeletal Normal                Neurological:      Headaches                                 Endocrine:  Diabetes           Dermatological:  Skin Normal    Psych:  Psychiatric Normal                    Physical Exam  General: Well nourished, Cooperative, Alert and Oriented    Airway:  TM Distance: Normal  Tongue: Normal  Neck ROM: Normal ROM    Chest/Lungs:  Clear to auscultation    Heart:  Rate: Normal    Abdomen:  Normal        Anesthesia Plan  Type of Anesthesia, risks & benefits discussed:    Anesthesia Type: Gen Natural Airway  Intra-op Monitoring Plan: Standard ASA Monitors  Induction:  IV  Informed Consent: Informed consent signed with the Patient and all parties understand the risks and agree with anesthesia plan.  All questions answered.   ASA Score: 3  Day of Surgery Review of History & Physical: H&P Update referred to the surgeon/provider.    Ready For Surgery From Anesthesia Perspective.     .

## 2023-12-19 ENCOUNTER — HOSPITAL ENCOUNTER (OUTPATIENT)
Facility: HOSPITAL | Age: 63
Discharge: HOME | End: 2023-12-19
Attending: INTERNAL MEDICINE | Admitting: INTERNAL MEDICINE
Payer: MEDICAID

## 2023-12-19 ENCOUNTER — ANESTHESIA (OUTPATIENT)
Dept: ENDOSCOPY | Facility: HOSPITAL | Age: 63
End: 2023-12-19
Payer: MEDICAID

## 2023-12-19 VITALS
OXYGEN SATURATION: 100 % | BODY MASS INDEX: 25.23 KG/M2 | HEIGHT: 66 IN | SYSTOLIC BLOOD PRESSURE: 180 MMHG | TEMPERATURE: 98 F | DIASTOLIC BLOOD PRESSURE: 81 MMHG | HEART RATE: 56 BPM | RESPIRATION RATE: 16 BRPM | WEIGHT: 157 LBS

## 2023-12-19 DIAGNOSIS — D64.9 CHRONIC ANEMIA: Primary | ICD-10-CM

## 2023-12-19 LAB — POCT GLUCOSE: 84 MG/DL (ref 70–110)

## 2023-12-19 PROCEDURE — 99900035 HC TECH TIME PER 15 MIN (STAT)

## 2023-12-19 PROCEDURE — 88342 IMHCHEM/IMCYTCHM 1ST ANTB: CPT | Performed by: STUDENT IN AN ORGANIZED HEALTH CARE EDUCATION/TRAINING PROGRAM

## 2023-12-19 PROCEDURE — 88342 IMHCHEM/IMCYTCHM 1ST ANTB: CPT | Mod: 26,,, | Performed by: STUDENT IN AN ORGANIZED HEALTH CARE EDUCATION/TRAINING PROGRAM

## 2023-12-19 PROCEDURE — 88305 TISSUE EXAM BY PATHOLOGIST: CPT | Mod: 26,,, | Performed by: STUDENT IN AN ORGANIZED HEALTH CARE EDUCATION/TRAINING PROGRAM

## 2023-12-19 PROCEDURE — 45378 DIAGNOSTIC COLONOSCOPY: CPT | Performed by: INTERNAL MEDICINE

## 2023-12-19 PROCEDURE — D9220A PRA ANESTHESIA: Mod: ,,, | Performed by: REGISTERED NURSE

## 2023-12-19 PROCEDURE — 88342 CHG IMMUNOCYTOCHEMISTRY: ICD-10-PCS | Mod: 26,,, | Performed by: STUDENT IN AN ORGANIZED HEALTH CARE EDUCATION/TRAINING PROGRAM

## 2023-12-19 PROCEDURE — 43239 EGD BIOPSY SINGLE/MULTIPLE: CPT | Performed by: INTERNAL MEDICINE

## 2023-12-19 PROCEDURE — 88305 TISSUE EXAM BY PATHOLOGIST: ICD-10-PCS | Mod: 26,,, | Performed by: STUDENT IN AN ORGANIZED HEALTH CARE EDUCATION/TRAINING PROGRAM

## 2023-12-19 PROCEDURE — 27201012 HC FORCEPS, HOT/COLD, DISP: Performed by: INTERNAL MEDICINE

## 2023-12-19 PROCEDURE — 82962 GLUCOSE BLOOD TEST: CPT | Performed by: INTERNAL MEDICINE

## 2023-12-19 PROCEDURE — D9220A PRA ANESTHESIA: ICD-10-PCS | Mod: ,,, | Performed by: REGISTERED NURSE

## 2023-12-19 PROCEDURE — 43239 EGD BIOPSY SINGLE/MULTIPLE: CPT | Mod: 51,,, | Performed by: INTERNAL MEDICINE

## 2023-12-19 PROCEDURE — 45378 PR COLONOSCOPY,DIAGNOSTIC: ICD-10-PCS | Mod: ,,, | Performed by: INTERNAL MEDICINE

## 2023-12-19 PROCEDURE — 45378 DIAGNOSTIC COLONOSCOPY: CPT | Mod: ,,, | Performed by: INTERNAL MEDICINE

## 2023-12-19 PROCEDURE — 88305 TISSUE EXAM BY PATHOLOGIST: CPT | Mod: 59 | Performed by: STUDENT IN AN ORGANIZED HEALTH CARE EDUCATION/TRAINING PROGRAM

## 2023-12-19 PROCEDURE — 25000003 PHARM REV CODE 250: Performed by: INTERNAL MEDICINE

## 2023-12-19 PROCEDURE — 63600175 PHARM REV CODE 636 W HCPCS: Performed by: REGISTERED NURSE

## 2023-12-19 PROCEDURE — 43239 PR EGD, FLEX, W/BIOPSY, SGL/MULTI: ICD-10-PCS | Mod: 51,,, | Performed by: INTERNAL MEDICINE

## 2023-12-19 PROCEDURE — 37000009 HC ANESTHESIA EA ADD 15 MINS: Performed by: INTERNAL MEDICINE

## 2023-12-19 PROCEDURE — 37000008 HC ANESTHESIA 1ST 15 MINUTES: Performed by: INTERNAL MEDICINE

## 2023-12-19 PROCEDURE — 25000003 PHARM REV CODE 250: Performed by: REGISTERED NURSE

## 2023-12-19 RX ORDER — LIDOCAINE HYDROCHLORIDE 20 MG/ML
INJECTION INTRAVENOUS
Status: DISCONTINUED | OUTPATIENT
Start: 2023-12-19 | End: 2023-12-19

## 2023-12-19 RX ORDER — PROPOFOL 10 MG/ML
VIAL (ML) INTRAVENOUS CONTINUOUS PRN
Status: DISCONTINUED | OUTPATIENT
Start: 2023-12-19 | End: 2023-12-19

## 2023-12-19 RX ORDER — PROPOFOL 10 MG/ML
VIAL (ML) INTRAVENOUS
Status: DISCONTINUED | OUTPATIENT
Start: 2023-12-19 | End: 2023-12-19

## 2023-12-19 RX ORDER — SODIUM CHLORIDE 9 MG/ML
INJECTION, SOLUTION INTRAVENOUS CONTINUOUS
Status: DISCONTINUED | OUTPATIENT
Start: 2023-12-19 | End: 2023-12-19 | Stop reason: HOSPADM

## 2023-12-19 RX ADMIN — LIDOCAINE HYDROCHLORIDE 100 MG: 20 INJECTION INTRAVENOUS at 01:12

## 2023-12-19 RX ADMIN — PROPOFOL 100 MG: 10 INJECTION, EMULSION INTRAVENOUS at 01:12

## 2023-12-19 RX ADMIN — PROPOFOL 150 MCG/KG/MIN: 10 INJECTION, EMULSION INTRAVENOUS at 01:12

## 2023-12-19 RX ADMIN — SODIUM CHLORIDE: 0.9 INJECTION, SOLUTION INTRAVENOUS at 12:12

## 2023-12-19 NOTE — PROVATION PATIENT INSTRUCTIONS
Discharge Summary/Instructions after an Endoscopic Procedure  Patient Name: Torsten Trevizo  Patient MRN: [id number]  Patient YOB: 1960 Tuesday, December 19, 2023  Ramón Upton MD  Dear patient,  As a result of recent federal legislation (The Federal Cures Act), you may   receive lab or pathology results from your procedure in your [facility]   account before your physician is able to contact you. Your physician or   their representative will relay the results to you with their   recommendations at their soonest availability.  Thank you,  RESTRICTIONS:  During your procedure today, you received medications for sedation.  These   medications may affect your judgment, balance and coordination.  Therefore,   for 24 hours, you have the following restrictions:   - DO NOT drive a car, operate machinery, make legal/financial decisions,   sign important papers or drink alcohol.    ACTIVITY:  Today: no heavy lifting, straining or running due to procedural   sedation/anesthesia.  The following day: return to full activity including work.  DIET:  Eat and drink normally unless instructed otherwise.     TREATMENT FOR COMMON SIDE EFFECTS:  - Mild abdominal pain, nausea, belching, bloating or excessive gas:  rest,   eat lightly and use a heating pad.  - Sore Throat: treat with throat lozenges and/or gargle with warm salt   water.  - Because air was used during the procedure, expelling large amounts of air   from your rectum or belching is normal.  - If a bowel prep was taken, you may not have a bowel movement for 1-3 days.    This is normal.  SYMPTOMS TO WATCH FOR AND REPORT TO YOUR PHYSICIAN:  1. Abdominal pain or bloating, other than gas cramps.  2. Chest pain.  3. Back pain.  4. Signs of infection such as: chills or fever occurring within 24 hours   after the procedure.  5. Rectal bleeding, which would show as bright red, maroon, or black stools.   (A tablespoon of blood from the rectum is not serious, especially  if   hemorrhoids are present.)  6. Vomiting.  7. Weakness or dizziness.  GO DIRECTLY TO THE NEAREST EMERGENCY ROOM IF YOU HAVE ANY OF THE FOLLOWING:      Difficulty breathing              Chills and/or fever over 101 F   Persistent vomiting and/or vomiting blood   Severe abdominal pain   Severe chest pain   Black, tarry stools   Bleeding- more than one tablespoon   Any other symptom or condition that you feel may need urgent attention  Your doctor recommends these additional instructions:  If any biopsies were taken, your doctors clinic will contact you in 1 to 2   weeks with any results.  - Patient has a contact number available for emergencies.  The signs and   symptoms of potential delayed complications were discussed with the   patient.  Return to normal activities tomorrow.  Written discharge   instructions were provided to the patient.   - Discharge patient to home.   - Resume previous diet.   - Continue present medications.   - Await pathology results.   For questions, problems or results please call your physician - Ramón Upton MD at Work:  [phone].  [facility], EMERGENCY ROOM PHONE NUMBER: [phone]  IF A COMPLICATION OR EMERGENCY SITUATION ARISES AND YOU ARE UNABLE TO REACH   YOUR PHYSICIAN - GO DIRECTLY TO THE EMERGENCY ROOM.  Ramón Upton MD  12/19/2023 1:09:58 PM  This report has been verified and signed electronically.  Dear patient,  As a result of recent federal legislation (The Federal Cures Act), you may   receive lab or pathology results from your procedure in your [facility]   account before your physician is able to contact you. Your physician or   their representative will relay the results to you with their   recommendations at their soonest availability.  Thank you,  PROVATION

## 2023-12-19 NOTE — PROVATION PATIENT INSTRUCTIONS
Discharge Summary/Instructions after an Endoscopic Procedure  Patient Name: Torsten Trevizo  Patient MRN: [id number]  Patient YOB: 1960 Tuesday, December 19, 2023  Ramón Upton MD  Dear patient,  As a result of recent federal legislation (The Federal Cures Act), you may   receive lab or pathology results from your procedure in your [facility]   account before your physician is able to contact you. Your physician or   their representative will relay the results to you with their   recommendations at their soonest availability.  Thank you,  RESTRICTIONS:  During your procedure today, you received medications for sedation.  These   medications may affect your judgment, balance and coordination.  Therefore,   for 24 hours, you have the following restrictions:   - DO NOT drive a car, operate machinery, make legal/financial decisions,   sign important papers or drink alcohol.    ACTIVITY:  Today: no heavy lifting, straining or running due to procedural   sedation/anesthesia.  The following day: return to full activity including work.  DIET:  Eat and drink normally unless instructed otherwise.     TREATMENT FOR COMMON SIDE EFFECTS:  - Mild abdominal pain, nausea, belching, bloating or excessive gas:  rest,   eat lightly and use a heating pad.  - Sore Throat: treat with throat lozenges and/or gargle with warm salt   water.  - Because air was used during the procedure, expelling large amounts of air   from your rectum or belching is normal.  - If a bowel prep was taken, you may not have a bowel movement for 1-3 days.    This is normal.  SYMPTOMS TO WATCH FOR AND REPORT TO YOUR PHYSICIAN:  1. Abdominal pain or bloating, other than gas cramps.  2. Chest pain.  3. Back pain.  4. Signs of infection such as: chills or fever occurring within 24 hours   after the procedure.  5. Rectal bleeding, which would show as bright red, maroon, or black stools.   (A tablespoon of blood from the rectum is not serious, especially  if   hemorrhoids are present.)  6. Vomiting.  7. Weakness or dizziness.  GO DIRECTLY TO THE NEAREST EMERGENCY ROOM IF YOU HAVE ANY OF THE FOLLOWING:      Difficulty breathing              Chills and/or fever over 101 F   Persistent vomiting and/or vomiting blood   Severe abdominal pain   Severe chest pain   Black, tarry stools   Bleeding- more than one tablespoon   Any other symptom or condition that you feel may need urgent attention  Your doctor recommends these additional instructions:  If any biopsies were taken, your doctors clinic will contact you in 1 to 2   weeks with any results.  - Patient has a contact number available for emergencies.  The signs and   symptoms of potential delayed complications were discussed with the   patient.  Return to normal activities tomorrow.  Written discharge   instructions were provided to the patient.   - Discharge patient to home.   - Resume previous diet.   - Continue present medications.   - Repeat colonoscopy in 10 years for screening purposes.   For questions, problems or results please call your physician - Ramón Upton MD at Work:  [phone].  [facility], EMERGENCY ROOM PHONE NUMBER: [phone]  IF A COMPLICATION OR EMERGENCY SITUATION ARISES AND YOU ARE UNABLE TO REACH   YOUR PHYSICIAN - GO DIRECTLY TO THE EMERGENCY ROOM.  Ramón Upton MD  12/19/2023 1:27:08 PM  This report has been verified and signed electronically.  Dear patient,  As a result of recent federal legislation (The Federal Cures Act), you may   receive lab or pathology results from your procedure in your [facility]   account before your physician is able to contact you. Your physician or   their representative will relay the results to you with their   recommendations at their soonest availability.  Thank you,  PROVATION

## 2023-12-19 NOTE — ANESTHESIA POSTPROCEDURE EVALUATION
Anesthesia Post Evaluation    Patient: Torsten Trevizo    Procedure(s) Performed: Procedure(s) (LRB):  EGD (ESOPHAGOGASTRODUODENOSCOPY) (N/A)  COLONOSCOPY (N/A)    Final Anesthesia Type: general      Patient location during evaluation: PACU  Patient participation: Yes- Able to Participate  Level of consciousness: awake  Post-procedure vital signs: reviewed and stable  Pain management: adequate  Airway patency: patent    PONV status at discharge: No PONV  Anesthetic complications: no      Cardiovascular status: blood pressure returned to baseline, hypertensive and stable  Respiratory status: unassisted  Hydration status: euvolemic  Follow-up not needed.              Vitals Value Taken Time   /81 12/19/23 1350   Temp 36.5 °C (97.7 °F) 12/19/23 1328   Pulse 56 12/19/23 1350   Resp 16 12/19/23 1350   SpO2 100 % 12/19/23 1350         Event Time   Out of Recovery 13:43:00         Pain/Donny Score: Donny Score: 9 (12/19/2023  1:43 PM)

## 2023-12-19 NOTE — H&P
Short Stay Endoscopy History and Physical    PCP - Diaz Yip MD    Procedure - EGD/Colonoscopy  Sedation: GA  ASA - per anesthesia  Mallampati - per anesthesia  History of Anesthesia problems - no  Family history Anesthesia problems -  no     HPI:  This is a 63 y.o. female here for evaluation of : Anemia    Reflux - no  Dysphagia - no  Abdominal pain - no  Diarrhea - no    ROS:  Constitutional: No fevers, chills, No weight loss  ENT: No allergies  CV: No chest pain  Pulm: No cough, No shortness of breath  Ophtho: No vision changes  GI: see HPI  Medical History:  has a past medical history of Cataract, Diabetes mellitus, High cholesterol, and Hypertension.    Surgical History:  has a past surgical history that includes  section; Hysterectomy; Cholecystectomy; Phacoemulsification of cataract (Right, 2021); Intraocular prosthesis insertion (Right, 2021); Cataract extraction w/  intraocular lens implant (Right, 2021); Eye surgery (Right); Extraction of cataract (Left, 10/14/2021); and Intraocular prosthesis insertion (Left, 10/14/2021).    Family History: family history includes Cataracts in her father and mother; Coronary artery disease in her mother; Diabetes in her mother; Glaucoma in her daughter and daughter; Hypertension in her father and mother; Lung cancer in her brother; Prostate cancer in her father; Stomach cancer in her brother; Stroke in her father.. Otherwise no colon cancer, inflammatory bowel disease, or GI malignancies.    Social History:  reports that she has never smoked. She has never used smokeless tobacco. She reports current alcohol use. She reports that she does not use drugs.    Review of patient's allergies indicates:   Allergen Reactions    Oxycodone-acetaminophen Itching       Medications:   Medications Prior to Admission   Medication Sig Dispense Refill Last Dose    atorvastatin (LIPITOR) 40 MG tablet Take 1 tablet (40 mg total) by mouth once daily. 90 tablet 1      blood-glucose meter kit To check BG 1 times daily, to use with insurance preferred meter 1 each 0     ciclopirox (PENLAC) 8 % Soln Apply topically nightly. 6.6 mL 3     dulaglutide (TRULICITY) 0.75 mg/0.5 mL pen injector Inject 0.75 mg into the skin every 7 days. 4 pen 11     hydroCHLOROthiazide (HYDRODIURIL) 12.5 MG Tab Take 12.5 mg by mouth.       lancets (ONETOUCH DELICA LANCETS) 33 gauge Misc 1 lancet by Misc.(Non-Drug; Combo Route) route once daily. ONCE DAILY BLOOD SUGAR TESTING; WHICHEVER BRAND COVERED BY INSURANCE (Patient not taking: Reported on 9/5/2023) 30 each 11     lisinopriL (PRINIVIL,ZESTRIL) 20 MG tablet Take 1 tablet (20 mg total) by mouth once daily. 90 tablet 3     meclizine (ANTIVERT) 25 mg tablet Take 1 tablet (25 mg total) by mouth 3 (three) times daily as needed for Dizziness. 30 tablet 0     meloxicam (MOBIC) 15 MG tablet Take as needed in the evening, no other NSAIDs, on a full stomach 30 tablet 0     ondansetron (ZOFRAN-ODT) 8 MG TbDL Take 1 tablet (8 mg total) by mouth every 8 (eight) hours as needed. 25 tablet 0        Objective Findings:    Vital Signs: Per nursing notes.    Physical Exam:  General Appearance: Well appearing in no acute distress  Head:   Normocephalic, without obvious abnormality  Eyes:    No scleral icterus  Airway: Open  Neck: No restriction in mobility  Lungs: CTA bilaterally in anterior and posterior fields, no wheezes, no crackles.  Heart:  Regular rate and rhythm, S1, S2 normal, no murmurs heard  Abdomen: Soft, non tender, non distended      Labs:  Lab Results   Component Value Date    WBC 6.16 09/14/2023    HGB 9.9 (L) 09/14/2023    HCT 31.2 (L) 09/14/2023     09/14/2023    CHOL 180 09/14/2023    TRIG 47 09/14/2023    HDL 58 09/14/2023    ALT 15 09/14/2023    AST 15 09/14/2023     09/14/2023    K 3.7 09/14/2023     09/14/2023    CREATININE 0.8 09/14/2023    BUN 13 09/14/2023    CO2 20 (L) 09/14/2023    TSH 0.930 05/16/2023    HGBA1C 5.5  09/14/2023         I have explained the risks and benefits of endoscopy procedures to the patient including but not limited to bleeding, perforation, infection, and death.    Thank you so much for allowing me to participate in the care of Torsten Upton MD

## 2023-12-19 NOTE — TRANSFER OF CARE
"Anesthesia Transfer of Care Note    Patient: Torsten Trevizo    Procedure(s) Performed: Procedure(s) (LRB):  EGD (ESOPHAGOGASTRODUODENOSCOPY) (N/A)  COLONOSCOPY (N/A)    Patient location: PACU    Anesthesia Type: general    Transport from OR: Transported from OR on room air with adequate spontaneous ventilation    Post pain: adequate analgesia    Post assessment: no apparent anesthetic complications and tolerated procedure well    Post vital signs: stable    Level of consciousness: awake    Nausea/Vomiting: no nausea/vomiting    Complications: none    Transfer of care protocol was followed      Last vitals: Visit Vitals  BP (!) 174/83 (BP Location: Left arm, Patient Position: Lying)   Pulse 61   Temp 36.6 °C (97.9 °F) (Temporal)   Resp 15   Ht 5' 6" (1.676 m)   Wt 71.2 kg (157 lb)   LMP  (LMP Unknown)   SpO2 99%   Breastfeeding No   BMI 25.34 kg/m²     "

## 2023-12-27 ENCOUNTER — PATIENT MESSAGE (OUTPATIENT)
Dept: FAMILY MEDICINE | Facility: CLINIC | Age: 63
End: 2023-12-27
Payer: MEDICAID

## 2024-01-05 ENCOUNTER — PATIENT OUTREACH (OUTPATIENT)
Dept: ADMINISTRATIVE | Facility: HOSPITAL | Age: 64
End: 2024-01-05
Payer: MEDICAID

## 2024-01-05 DIAGNOSIS — E11.9 TYPE 2 DIABETES MELLITUS WITHOUT COMPLICATION, WITHOUT LONG-TERM CURRENT USE OF INSULIN: Primary | ICD-10-CM

## 2024-01-08 ENCOUNTER — OFFICE VISIT (OUTPATIENT)
Dept: FAMILY MEDICINE | Facility: CLINIC | Age: 64
End: 2024-01-08
Payer: MEDICAID

## 2024-01-08 VITALS
TEMPERATURE: 98 F | BODY MASS INDEX: 25.54 KG/M2 | OXYGEN SATURATION: 97 % | HEART RATE: 55 BPM | SYSTOLIC BLOOD PRESSURE: 120 MMHG | HEIGHT: 66 IN | DIASTOLIC BLOOD PRESSURE: 70 MMHG | WEIGHT: 158.94 LBS

## 2024-01-08 DIAGNOSIS — I10 ESSENTIAL HYPERTENSION: ICD-10-CM

## 2024-01-08 DIAGNOSIS — E78.5 HYPERLIPIDEMIA ASSOCIATED WITH TYPE 2 DIABETES MELLITUS: ICD-10-CM

## 2024-01-08 DIAGNOSIS — E11.9 TYPE 2 DIABETES MELLITUS WITHOUT COMPLICATION, WITHOUT LONG-TERM CURRENT USE OF INSULIN: Primary | ICD-10-CM

## 2024-01-08 DIAGNOSIS — K21.9 GASTROESOPHAGEAL REFLUX DISEASE, UNSPECIFIED WHETHER ESOPHAGITIS PRESENT: ICD-10-CM

## 2024-01-08 DIAGNOSIS — E11.69 HYPERLIPIDEMIA ASSOCIATED WITH TYPE 2 DIABETES MELLITUS: ICD-10-CM

## 2024-01-08 PROCEDURE — 3008F BODY MASS INDEX DOCD: CPT | Mod: CPTII,,, | Performed by: FAMILY MEDICINE

## 2024-01-08 PROCEDURE — 3074F SYST BP LT 130 MM HG: CPT | Mod: CPTII,,, | Performed by: FAMILY MEDICINE

## 2024-01-08 PROCEDURE — 1159F MED LIST DOCD IN RCRD: CPT | Mod: CPTII,,, | Performed by: FAMILY MEDICINE

## 2024-01-08 PROCEDURE — 1160F RVW MEDS BY RX/DR IN RCRD: CPT | Mod: CPTII,,, | Performed by: FAMILY MEDICINE

## 2024-01-08 PROCEDURE — 99999 PR PBB SHADOW E&M-EST. PATIENT-LVL IV: CPT | Mod: PBBFAC,,, | Performed by: FAMILY MEDICINE

## 2024-01-08 PROCEDURE — 99214 OFFICE O/P EST MOD 30 MIN: CPT | Mod: S$PBB,,, | Performed by: FAMILY MEDICINE

## 2024-01-08 PROCEDURE — 3078F DIAST BP <80 MM HG: CPT | Mod: CPTII,,, | Performed by: FAMILY MEDICINE

## 2024-01-08 PROCEDURE — 99214 OFFICE O/P EST MOD 30 MIN: CPT | Mod: PBBFAC,PO | Performed by: FAMILY MEDICINE

## 2024-01-08 RX ORDER — CEPHALEXIN 500 MG/1
CAPSULE ORAL
COMMUNITY
Start: 2023-11-21

## 2024-01-08 NOTE — PROGRESS NOTES
"  Physical Exam  /70   Pulse (!) 55   Temp 98 °F (36.7 °C) (Oral)   Ht 5' 6" (1.676 m)   Wt 72.1 kg (158 lb 15.2 oz)   LMP  (LMP Unknown)   SpO2 97%   BMI 25.66 kg/m²      Office Visit    Patient Name: Torsten Trevizo    : 1960  MRN: [id number]      Assessment/Plan:  Torsten Trevizo is a 63 y.o. female who presents today for :    Type 2 diabetes mellitus without complication, without long-term current use of insulin hx metformin with GI SE; saxagliptin expensive  -     Hemoglobin A1C; Future; Expected date: 2024  -     CBC Without Differential; Future; Expected date: 2024  -     Microalbumin/Creatinine Ratio, Urine; Future; Expected date: 2024  Hyperlipidemia associated with type 2 diabetes mellitus  -previous A1c reviewed:   Lab Results   Component Value Date    HGBA1C 5.5 2023     -controlled, continue current medication regimen  -reviewed with patient about routine diabetic care that includes but are not limited to regular eye exams (scheduled), skin care, daily foot exam, proper nutrition, regular BG monitoring at home   -weight loss and regular physical excercise      Essential hypertension hx of hctz, amlodipine, lisinopril, propranolol  -     CBC Without Differential; Future; Expected date: 2024  -     Comprehensive Metabolic Panel; Future; Expected date: 2024  -continue current medication regimen  -DASH diet, regular cardiovascular exercises      Gastroesophageal reflux disease  -stable, continue current regimen       Anemia  -stable, asymptomatic, recheck H/H  -stuart rich diet        Follow up with PCP as scheduled for routine care        This note was created by combination of typed  and MModal dictation.  Transcription errors may be present.  If there are any questions, please contact me.      ----------------------------------------------------------------------------------------------------------------------      HPI:  Patient Care " Team:  Diaz Yip MD as PCP - General (Internal Medicine)  Sharon Mercedes as Digital Medicine Health   Darryl Lockett, PharmD as Diabetes Digital Medicine Clinician  Darryl Lockett, PharmD as Hypertension Digital Medicine Clinician  Diaz Yip MD as Diabetes Digital Medicine Responsible Provider (Internal Medicine)  Diaz Yip MD as Hypertension Digital Medicine Responsible Provider (Internal Medicine)  Leslie Irby MA as Care Coordinator  MEDICAID as Hypertension Digital Medicine Contract  MEDICAID as Diabetes Digital Medicine Contract    Torsten is a 63 y.o. female with      Patient Active Problem List   Diagnosis    Dyslipidemia hx of crestor, lipitor    Essential hypertension hx of hctz, amlodipine, lisinopril, propranolol    Type 2 diabetes mellitus without complication, without long-term current use of insulin hx metformin with GI SE; saxagliptin expensive    GERD (gastroesophageal reflux disease)    Headache chronic with hx of topamax hx of propranolol    Primary insomnia hx of melatonin without relief; hx of trazodone    Screening for colon cancer 11/21/2016 colonoscopy normal repeat 10 years.    Nuclear sclerotic cataract of right eye    Nuclear sclerotic cataract of left eye       Torsten presents today for:  Diabetes and Follow-up      DM - patient is compliant with Trulicity without any side effects - her last A1c was at goal. She states her home FBG readings are typically in the 80s range. No polyuria/polydipsia. No foot numbness/tingling/vision changes/hypoglycemia. Her BP is well controlled on current regimen. She has cut down on a lot of carb intake and was able to lose about 6 lbs since her last OV.  She did have some anemia with normal iron study profile. Her H/H was stable at last check 9/2023. EGD/Colonoscopy showed no GI bleeds. She denies any No CP/SOB/RIVERA/palpitations/claudication/leg swelling.        Hemoglobin A1C   Date Value Ref Range Status   09/14/2023 5.5 4.0 - 5.6  % Final     Comment:     ADA Screening Guidelines:  5.7-6.4%  Consistent with prediabetes  >or=6.5%  Consistent with diabetes    High levels of fetal hemoglobin interfere with the HbA1C  assay. Heterozygous hemoglobin variants (HbS, HgC, etc)do  not significantly interfere with this assay.   However, presence of multiple variants may affect accuracy.     09/05/2023 5.7 (H) 4.0 - 5.6 % Final     Comment:     ADA Screening Guidelines:  5.7-6.4%  Consistent with prediabetes  >or=6.5%  Consistent with diabetes    High levels of fetal hemoglobin interfere with the HbA1C  assay. Heterozygous hemoglobin variants (HbS, HgC, etc)do  not significantly interfere with this assay.   However, presence of multiple variants may affect accuracy.     05/16/2023 5.8 (H) 4.0 - 5.6 % Final     Comment:     ADA Screening Guidelines:  5.7-6.4%  Consistent with prediabetes  >or=6.5%  Consistent with diabetes    High levels of fetal hemoglobin interfere with the HbA1C  assay. Heterozygous hemoglobin variants (HbS, HgC, etc)do  not significantly interfere with this assay.   However, presence of multiple variants may affect accuracy.                 Additional ROS      CONST: no fever, no activity change, weight stable.   EYES: no vision change.   ENT: no sore throat. No dysphagia.   CV: no CP with exertion  RESP: no SOB  GI: no N/V/diarrhea/constipation  : no urinary concerns  MSK: no new myalgias or arthralgias.   SKIN: no new rashes  NEURO: no focal deficits.   PSYCH: no new issues.   ENDOCRINE: no polyuria.             Patient Active Problem List   Diagnosis    Dyslipidemia hx of crestor, lipitor    Essential hypertension hx of hctz, amlodipine, lisinopril, propranolol    Type 2 diabetes mellitus without complication, without long-term current use of insulin hx metformin with GI SE; saxagliptin expensive    GERD (gastroesophageal reflux disease)    Headache chronic with hx of topamax hx of propranolol    Primary insomnia hx of melatonin  without relief; hx of trazodone    Screening for colon cancer 2016 colonoscopy normal repeat 10 years.    Nuclear sclerotic cataract of right eye    Nuclear sclerotic cataract of left eye       Current Medications  Medications reviewed/updated.     Current Outpatient Medications on File Prior to Visit   Medication Sig Dispense Refill    atorvastatin (LIPITOR) 40 MG tablet Take 1 tablet (40 mg total) by mouth once daily. 90 tablet 1    blood-glucose meter kit To check BG 1 times daily, to use with insurance preferred meter 1 each 0    cephALEXin (KEFLEX) 500 MG capsule TAKE 1 CAPSULE BY MOUTH 4 TIMES DAILY FOR 10 DAYS      ciclopirox (PENLAC) 8 % Soln Apply topically nightly. 6.6 mL 3    dulaglutide (TRULICITY) 0.75 mg/0.5 mL pen injector Inject 0.75 mg into the skin every 7 days. 4 pen 11    hydroCHLOROthiazide (HYDRODIURIL) 12.5 MG Tab Take 12.5 mg by mouth.      lisinopriL (PRINIVIL,ZESTRIL) 20 MG tablet Take 1 tablet (20 mg total) by mouth once daily. 90 tablet 3    meclizine (ANTIVERT) 25 mg tablet Take 1 tablet (25 mg total) by mouth 3 (three) times daily as needed for Dizziness. 30 tablet 0    meloxicam (MOBIC) 15 MG tablet Take as needed in the evening, no other NSAIDs, on a full stomach 30 tablet 0    ondansetron (ZOFRAN-ODT) 8 MG TbDL Take 1 tablet (8 mg total) by mouth every 8 (eight) hours as needed. 25 tablet 0     No current facility-administered medications on file prior to visit.           Past Surgical History:   Procedure Laterality Date    CATARACT EXTRACTION W/  INTRAOCULAR LENS IMPLANT Right 2021    Dr. Ferreira     SECTION      CHOLECYSTECTOMY      COLONOSCOPY N/A 2023    Procedure: COLONOSCOPY;  Surgeon: Ramón Upton MD;  Location: [facility] ENDOSCOPY;  Service: Endoscopy;  Laterality: N/A;  WLMeds / Referral: PEG AMBRIZ / COLTEN /  Instructions portal - LW  - instructions to email [email or web address]), pre call complete.  DBM    ESOPHAGOGASTRODUODENOSCOPY N/A  12/19/2023    Procedure: EGD (ESOPHAGOGASTRODUODENOSCOPY);  Surgeon: Ramón Upton MD;  Location: [facility] ENDOSCOPY;  Service: Endoscopy;  Laterality: N/A;    EXTRACTION OF CATARACT Left 10/14/2021    Procedure: EXTRACTION, CATARACT;  Surgeon: Mina Ferreira MD;  Location: [facility] OR;  Service: Ophthalmology;  Laterality: Left;  RN Phone Pre Op. 10-6-21. Covid NEGATIVE 10-13-21. Arrival 06:00 am.  [place]    EYE SURGERY Right     catartact    HYSTERECTOMY      Age 53, Fibroids    INTRAOCULAR PROSTHESES INSERTION Right 9/23/2021    Procedure: INSERTION, IOL PROSTHESIS;  Surgeon: Mina Ferreira MD;  Location: [facility] OR;  Service: Ophthalmology;  Laterality: Right;    INTRAOCULAR PROSTHESES INSERTION Left 10/14/2021    Procedure: INSERTION, IOL PROSTHESIS;  Surgeon: Mina Ferreira MD;  Location: [facility] OR;  Service: Ophthalmology;  Laterality: Left;    PHACOEMULSIFICATION OF CATARACT Right 9/23/2021    Procedure: PHACOEMULSIFICATION, CATARACT;  Surgeon: Mina Ferreira MD;  Location: [facility] OR;  Service: Ophthalmology;  Laterality: Right;  RN Phone Pre Op 9-20-21. Covid NEGATIVE ON  9-22-21..  Arrival 05:30 am.  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     Employer: [employer]   Tobacco Use    Smoking status: Never    Smokeless tobacco: Never   Substance and Sexual Activity    Alcohol use: Yes     Comment:  "socially    Drug use: Never     Social Determinants of Health     Financial Resource Strain: Medium Risk (1/8/2024)    Overall Financial Resource Strain (CARDIA)     Difficulty of Paying Living Expenses: Somewhat hard   Food Insecurity: Food Insecurity Present (1/8/2024)    Hunger Vital Sign     Worried About Running Out of Food in the Last Year: Sometimes true     Ran Out of Food in the Last Year: Often true   Transportation Needs: Unmet Transportation Needs (1/8/2024)    PRAPARE - Transportation     Lack of Transportation (Medical): Yes     Lack of Transportation (Non-Medical): Yes   Physical Activity: Sufficiently Active (1/8/2024)    Exercise Vital Sign     Days of Exercise per Week: 7 days     Minutes of Exercise per Session: 60 min   Stress: Stress Concern Present (1/8/2024)    [employer] [place] [employer] - Occupational Stress Questionnaire     Feeling of Stress : To some extent   Social Connections: Unknown (1/8/2024)    Social Connection and Isolation Panel [NHANES]     Frequency of Communication with Friends and Family: More than three times a week     Frequency of Social Gatherings with Friends and Family: More than three times a week     Attends Club or Organization Meetings: More than 4 times per year     Marital Status:    Housing Stability: High Risk (1/8/2024)    Housing Stability Vital Sign     Unable to Pay for Housing in the Last Year: Yes     Unstable Housing in the Last Year: No             Allergies   Review of patient's allergies indicates:   Allergen Reactions    Oxycodone-acetaminophen Itching             Review of Systems  See HPI      [unfilled]  /70   Pulse (!) 55   Temp 98 °F (36.7 °C) (Oral)   Ht 5' 6" (1.676 m)   Wt 72.1 kg (158 lb 15.2 oz)   LMP  (LMP Unknown)   SpO2 97%   BMI 25.66 kg/m²       GEN: NAD, pleasant  HEENT: NCAT, PERRLA, EOMI, sclera clear, anicteric  NECK: normal, supple  LUNGS: CTAB, no w/r/r, normal respiratory effort  HEART: RRR, " normal S1 and S2, no m/r/g, no palpitations, no edema  ABD: s/nt/nd, NABS, no organomegaly  SKIN: warm and dry with normal turgor, no rashes, no other lesions.   PSYCH: AOx3, appropriate mood and affect.   MSK: extremities warm/well perfused, normal ROM in all 4 extremities, no c/c/e.   NEURO: normal without focal findings, CN II-XII are intact

## 2024-01-09 ENCOUNTER — PATIENT MESSAGE (OUTPATIENT)
Dept: GASTROENTEROLOGY | Facility: CLINIC | Age: 64
End: 2024-01-09
Payer: MEDICAID

## 2024-01-09 ENCOUNTER — TELEPHONE (OUTPATIENT)
Dept: GASTROENTEROLOGY | Facility: CLINIC | Age: 64
End: 2024-01-09
Payer: MEDICAID

## 2024-01-09 LAB
FINAL PATHOLOGIC DIAGNOSIS: NORMAL
GROSS: NORMAL
Lab: NORMAL
SUPPLEMENTAL DIAGNOSIS: NORMAL

## 2024-01-09 RX ORDER — DOXYCYCLINE HYCLATE 100 MG
100 TABLET ORAL 2 TIMES DAILY
Qty: 20 TABLET | Refills: 0 | Status: SHIPPED | OUTPATIENT
Start: 2024-01-09 | End: 2024-01-19

## 2024-01-09 RX ORDER — METRONIDAZOLE 250 MG/1
250 TABLET ORAL EVERY 6 HOURS
Qty: 40 TABLET | Refills: 0 | Status: SHIPPED | OUTPATIENT
Start: 2024-01-09 | End: 2024-01-19

## 2024-01-09 RX ORDER — OMEPRAZOLE 40 MG/1
40 CAPSULE, DELAYED RELEASE ORAL
Qty: 20 CAPSULE | Refills: 0 | Status: SHIPPED | OUTPATIENT
Start: 2024-01-09 | End: 2024-01-19

## 2024-01-09 NOTE — TELEPHONE ENCOUNTER
Patient notified the results of gastric biopsies (H.pylori positive). Treatment sent to the pharmacy.

## 2024-01-09 NOTE — TELEPHONE ENCOUNTER
----- Message from Ramón Upton MD sent at 1/9/2024 11:37 AM CST -----  Please schedule a follow up visit in 8-10 weeks.

## 2024-01-09 NOTE — TELEPHONE ENCOUNTER
----- Message from Ramón Upton MD sent at 1/9/2024 10:55 AM CST -----  Nothing concerning on biopsies.

## 2024-01-17 ENCOUNTER — OFFICE VISIT (OUTPATIENT)
Dept: OPHTHALMOLOGY | Facility: CLINIC | Age: 64
End: 2024-01-17
Attending: OPHTHALMOLOGY
Payer: MEDICAID

## 2024-01-17 DIAGNOSIS — H34.8120 CENTRAL RETINAL VEIN OCCLUSION WITH MACULAR EDEMA OF LEFT EYE: Primary | ICD-10-CM

## 2024-01-17 DIAGNOSIS — E11.9 DM TYPE 2 WITHOUT RETINOPATHY: ICD-10-CM

## 2024-01-17 PROCEDURE — 99213 OFFICE O/P EST LOW 20 MIN: CPT | Mod: PBBFAC,PO | Performed by: OPHTHALMOLOGY

## 2024-01-17 PROCEDURE — 92134 CPTRZ OPH DX IMG PST SGM RTA: CPT | Mod: PBBFAC,PO | Performed by: OPHTHALMOLOGY

## 2024-01-17 PROCEDURE — 99999 PR PBB SHADOW E&M-EST. PATIENT-LVL III: CPT | Mod: PBBFAC,,, | Performed by: OPHTHALMOLOGY

## 2024-01-17 PROCEDURE — 92014 COMPRE OPH EXAM EST PT 1/>: CPT | Mod: S$PBB,,, | Performed by: OPHTHALMOLOGY

## 2024-01-17 PROCEDURE — 3061F NEG MICROALBUMINURIA REV: CPT | Mod: CPTII,,, | Performed by: OPHTHALMOLOGY

## 2024-01-17 PROCEDURE — 1160F RVW MEDS BY RX/DR IN RCRD: CPT | Mod: CPTII,,, | Performed by: OPHTHALMOLOGY

## 2024-01-17 PROCEDURE — 1159F MED LIST DOCD IN RCRD: CPT | Mod: CPTII,,, | Performed by: OPHTHALMOLOGY

## 2024-01-17 PROCEDURE — 3066F NEPHROPATHY DOC TX: CPT | Mod: CPTII,,, | Performed by: OPHTHALMOLOGY

## 2024-01-20 NOTE — PROGRESS NOTES
Subjective:       Patient ID: Torsten Trevizo is a 63 y.o. female      Chief Complaint   Patient presents with    Concerns About Ocular Health     History of Present Illness  HPI    1 mo OCT/DFE  DLS-12/13/2023 Dr. Hidalgo     Pt sts vision seems to be about the same since last visit.  Denies any eye pain.  When waking up in am she has a film over eyes that   causes extreme blurred vision will use warm compresses to help vision   clear up.   Occasional itching in inner corners of eyes.       (-)Flashes (+)Floaters sees spots and shadows   (+)Photophobia  (+)Glare    EYEMEDS:  No gtts   Last edited by Mushtaq Hidalgo MD on 1/20/2024  3:40 PM.        Imaging:    See report    Assessment/Plan:     1. Central retinal vein occlusion with macular edema of left eye  ME sig improvement but still some ME.  Has vision much worse than would be expected from ME and also atrophic thinning on OCT c/w ischemia  Discussed RBA continue injections vs holding off on injections since vision not expected to improve sig  Discussed risk of NV if stopping antiVEGF and is sig ischemic  Pt elects to hold off on injections.  So rec IVFA after AV washout, assess for isch/NV    CV risk factor control    - Posterior Segment OCT Retina-Both eyes    2. DM type 2 without retinopathy  Monitor.    CV risk factor control      Follow up in about 1 month (around 2/17/2024), or if symptoms worsen or fail to improve, for Dilated examination, OCT Mac, Fundus Photo, IVFA Right Transit.

## 2024-03-04 ENCOUNTER — TELEPHONE (OUTPATIENT)
Dept: GASTROENTEROLOGY | Facility: CLINIC | Age: 64
End: 2024-03-04
Payer: MEDICAID

## 2024-03-06 ENCOUNTER — TELEPHONE (OUTPATIENT)
Dept: OPHTHALMOLOGY | Facility: CLINIC | Age: 64
End: 2024-03-06
Payer: MEDICAID

## 2024-03-06 NOTE — TELEPHONE ENCOUNTER
Calling pt back reguarding sooner appt.   Pt had an appt and no showed. Not showing pt has an appt scheduled.     Pt did not answer the phone and you can not leave a vm.

## 2024-03-06 NOTE — TELEPHONE ENCOUNTER
----- Message from Yazmin Ramírez sent at 3/6/2024 11:54 AM CST -----  Type:  Sooner Appointment Request    Caller is requesting a sooner appointment.  Caller declined first available appointment listed below.  Caller will not accept being placed on the waitlist and is requesting a message be sent to doctor.  Name of Caller:pt  When is the first available appointment?n/a  Symptoms:4-6 ek FA  Would the patient rather a call back or a response via [employer]? call  Best Call Back Number: [phone]  Additional Information:

## 2024-03-08 PROBLEM — A04.8 H. PYLORI INFECTION: Status: ACTIVE | Noted: 2024-03-08

## 2024-03-08 NOTE — PROGRESS NOTES
This note was created by combination of typed  and M-Modal dictation.  Transcription errors may be present.  If there are any questions, please contact me.    Assessment and Plan:   Assessment and Plan    Type 2 diabetes mellitus without complication, without long-term current use of insulin hx metformin with GI SE; saxagliptin expensive  -unfortunately pre visit labs did not include A1c.  Last A1c was good.  On Trulicity, tolerating without issues.  Check CMP, A1c.  Refilled Trulicity low-dose.  Notes chronic constipation which preceded Trulicity.  Takes Mg citrate for this.  [facility] [address].  She does not trust mail order b/c she works overnight, lives at [facility], and does not want package sitting out there  -     Comprehensive Metabolic Panel; Future; Expected date: 09/11/2024  -     Lipid Panel; Future; Expected date: 09/11/2024  -     Hemoglobin A1C; Future; Expected date: 09/11/2024  -     CBC Without Differential; Future; Expected date: 09/11/2024  -     Comprehensive Metabolic Panel; Future; Expected date: 03/11/2024  -     Hemoglobin A1C; Future; Expected date: 03/11/2024  -     Cancel: CBC Without Differential; Future; Expected date: 03/11/2024  -     dulaglutide (TRULICITY) 0.75 mg/0.5 mL pen injector; Inject 0.75 mg into the skin every 7 days.  Dispense: 4 pen ; Refill: 11    Essential hypertension hx of hctz, amlodipine, lisinopril, propranolol  Hypotension, unspecified hypotension type  -blood pressure today is good off of HCTZ.  Continue monotherapy with lisinopril.  Reports home readings less than 140/90  -     lisinopriL (PRINIVIL,ZESTRIL) 20 MG tablet; Take 1 tablet (20 mg total) by mouth once daily.  Dispense: 90 tablet; Refill: 3    Dyslipidemia hx of crestor, lipitor  -last lipid profile good in September on statin, no changes  -     atorvastatin (LIPITOR) 40 MG tablet; Take 1 tablet (40 mg total) by mouth once daily.   Dispense: 90 tablet; Refill: 3    H. pylori infection  Gastroesophageal reflux disease without esophagitis  -status post H pylori treatment in January.  Pre visit labs CBC hemoglobin mildly improved.  Has upcoming follow-up with GI.  Send her home with stool H pylori for testing for clearance.  -     Cancel: CBC Without Differential; Future; Expected date: 03/11/2024  -     H. pylori antigen, stool; Future; Expected date: 03/11/2024    Screening for colon cancer 11/21/2016 colonoscopy normal repeat 10 years.  -stable      Encounter for screening mammogram for malignant neoplasm of breast  -     Mammo Digital Screening Bilat; Future; Expected date: 05/10/2024             Medications Discontinued During This Encounter   Medication Reason    hydroCHLOROthiazide (HYDRODIURIL) 12.5 MG Tab Therapy completed    cephALEXin (KEFLEX) 500 MG capsule Therapy completed    omeprazole (PRILOSEC) 40 MG capsule Therapy completed    atorvastatin (LIPITOR) 40 MG tablet Reorder    dulaglutide (TRULICITY) 0.75 mg/0.5 mL pen injector Reorder    lisinopriL (PRINIVIL,ZESTRIL) 20 MG tablet Reorder       meds sent this encounter:     Medications Ordered This Encounter   Medications    atorvastatin (LIPITOR) 40 MG tablet     Sig: Take 1 tablet (40 mg total) by mouth once daily.     Dispense:  90 tablet     Refill:  3     Pharmacy update refills, keep on file, not requesting Rx to be filled today.    dulaglutide (TRULICITY) 0.75 mg/0.5 mL pen injector     Sig: Inject 0.75 mg into the skin every 7 days.     Dispense:  4 pen      Refill:  11     Pharmacy update refills, keep on file, not requesting Rx to be filled today.    lisinopriL (PRINIVIL,ZESTRIL) 20 MG tablet     Sig: Take 1 tablet (20 mg total) by mouth once daily.     Dispense:  90 tablet     Refill:  3     Pharmacy update refills, keep on file, not requesting Rx to be filled today.        Follow Up:  Plan for follow-up 6 months with labs  Future Appointments   Date Time Provider  Department Center   3/11/2024  2:00 PM [facility] [place]   3/19/2024  8:30 AM Ramón Upton MD [facility] [place]   5/13/2024  8:40 AM [facility] [place]   9/11/2024  8:00 AM LAB, [facility] [place]   9/16/2024 10:20 AM Diaz Yip MD [facility] [place]         Subjective:   Subjective   Chief Complaint   Patient presents with    Diabetes       HPI  Torsten is a 63 y.o. female.    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     Employer: [employer]      Social History     Social History Narrative    Not on file       No LMP recorded (lmp unknown). Patient has had a hysterectomy.    Last appointment with this clinic was 1/8/2024. Last visit with me 9/5/2023   To summarize last visit and events leading up to today:  Diabetes.  Trulicity possible side effect of fatigue.  Offered to change, she ultimately decided to stay on Trulicity.  Want her to reestablish with digital monitoring.  Hypertension, reestablish with digital monitoring.  Hyperlipidemia statin.    Last visit with me 09/05/2023   Diabetes stable on Trulicity.  Weight loss.  Stable.    Hypertension with 40 lb weight loss I suspect her sensation of dizziness and lightheadedness are from low BP.  Stop HCTZ.  Stay on lisinopril 20.     Labs anemia.  Ferritin was okay.  Ordered EGD and colonoscopy    12/19/23 colonoscopy normal repeat 10 years  12/19/23 EGD normal stomach, duodenum.  Path showing Brunner's gland hyperplasia.  H pylori positive    Has a appointment with GI 3/19    Today's visit:    Took the antibiotics  Denies SE  Off of PPI.    Discussed the need for stool H pylori document clearance.    Taking the trulicity  Some fatigue  And lower appetite  But not severe  Weight remains  "stable. Down from a high of 213    Taking lisinopril  Not taking hctz  Checks BP and glc  By recall BP are "normal" 120/70s. BP today is OK.     Reports taking statin    Not taking mobic.  PRN use.      Off of PPI.    [facility] [address]  She does not trust mail order b/c she works overnight, lives at [facility], and does not want package sitting out there.    Chronic constipation, takes Mag citrate, this preceded trulicity    Weight is stable.      Pre visit lab unfortunately only CBC was done.  Which shows some improvement.  But needs CMP and A1c.       Patient Care Team:  Diaz Yip MD as PCP - General (Internal Medicine)  Sharon Mercedes as Digital Medicine Health   Darryl Lockett, PharmLISBETH as Diabetes Digital Medicine Clinician  Darryl Lockett, PharmD as Hypertension Digital Medicine Clinician  Diaz Yip MD as Diabetes Digital Medicine Responsible Provider (Internal Medicine)  Diaz Yip MD as Hypertension Digital Medicine Responsible Provider (Internal Medicine)  Leslie Irby MA as Care Coordinator  MEDICAID as Hypertension Digital Medicine Contract  MEDICAID as Diabetes Digital Medicine Contract      Patient Active Problem List    Diagnosis Date Noted    Nuclear sclerotic cataract of left eye 10/14/2021    Nuclear sclerotic cataract of right eye 09/23/2021    Screening for colon cancer 11/21/2016 colonoscopy normal repeat 10 years. 06/01/2020 11/21/2016 colonoscopy normal repeat 10 years.      Primary insomnia hx of melatonin without relief; hx of trazodone 03/13/2020    GERD (gastroesophageal reflux disease) 01/29/2016    Dyslipidemia hx of crestor, lipitor 10/19/2015    Headache chronic with hx of topamax hx of propranolol 10/19/2015    Essential hypertension hx of hctz, amlodipine, lisinopril, propranolol 11/20/2014    Type 2 diabetes mellitus without complication, without long-term current use of insulin hx metformin with " "GI SE; saxagliptin expensive 11/20/2014       PAST MEDICAL PROBLEMS, PAST SURGICAL HISTORY: please see relevant portions of the electronic medical record    ALLERGIES AND MEDICATIONS: updated and reviewed.  Medication List with Changes/Refills   Current Medications    ATORVASTATIN (LIPITOR) 40 MG TABLET    Take 1 tablet (40 mg total) by mouth once daily.    BLOOD-GLUCOSE METER KIT    To check BG 1 times daily, to use with insurance preferred meter    CEPHALEXIN (KEFLEX) 500 MG CAPSULE    TAKE 1 CAPSULE BY MOUTH 4 TIMES DAILY FOR 10 DAYS    CICLOPIROX (PENLAC) 8 % SOLN    Apply topically nightly.    DULAGLUTIDE (TRULICITY) 0.75 MG/0.5 ML PEN INJECTOR    Inject 0.75 mg into the skin every 7 days.    HYDROCHLOROTHIAZIDE (HYDRODIURIL) 12.5 MG TAB    Take 12.5 mg by mouth.    LISINOPRIL (PRINIVIL,ZESTRIL) 20 MG TABLET    Take 1 tablet (20 mg total) by mouth once daily.    MECLIZINE (ANTIVERT) 25 MG TABLET    Take 1 tablet (25 mg total) by mouth 3 (three) times daily as needed for Dizziness.    MELOXICAM (MOBIC) 15 MG TABLET    Take as needed in the evening, no other NSAIDs, on a full stomach    OMEPRAZOLE (PRILOSEC) 40 MG CAPSULE    Take 1 capsule (40 mg total) by mouth 2 (two) times daily before meals. for 10 days    ONDANSETRON (ZOFRAN-ODT) 8 MG TBDL    Take 1 tablet (8 mg total) by mouth every 8 (eight) hours as needed.         Objective:   Objective   Physical Exam   Vitals:    03/11/24 1041   BP: 134/72   Pulse: (!) 51   Temp: 97.5 °F (36.4 °C)   TempSrc: Oral   SpO2: 99%   Weight: 74.2 kg (163 lb 11.1 oz)   Height: 5' 6" (1.676 m)    Body mass index is 26.42 kg/m².            Physical Exam  Constitutional:       General: She is not in acute distress.     Appearance: She is well-developed.   Eyes:      Extraocular Movements: Extraocular movements intact.   Cardiovascular:      Rate and Rhythm: Normal rate and regular rhythm.      Heart sounds: Normal heart sounds. No murmur heard.  Pulmonary:      Effort: Pulmonary " effort is normal.      Breath sounds: Normal breath sounds.   Musculoskeletal:         General: Normal range of motion.      Right lower leg: No edema.      Left lower leg: No edema.   Skin:     General: Skin is warm and dry.   Neurological:      Mental Status: She is alert and oriented to person, place, and time.      Coordination: Coordination normal.   Psychiatric:         Behavior: Behavior normal.         Thought Content: Thought content normal.       Component      Latest Ref [facility] 3/11/2024   WBC      3.90 - 12.70 K/uL 5.63    RBC      4.00 - 5.40 M/uL 3.79 (L)    Hemoglobin      12.0 - 16.0 g/dL 11.9 (L)    Hematocrit      37.0 - 48.5 % 35.6 (L)    MCV      82 - 98 fL 94    MCH      27.0 - 31.0 pg 31.4 (H)    MCHC      32.0 - 36.0 g/dL 33.4    RDW      11.5 - 14.5 % 12.2    Platelet Count      150 - 450 K/uL 234    MPV      9.2 - 12.9 fL 10.4       Legend:  (L) Low  (H) High

## 2024-03-11 ENCOUNTER — OFFICE VISIT (OUTPATIENT)
Dept: FAMILY MEDICINE | Facility: CLINIC | Age: 64
End: 2024-03-11
Payer: MEDICAID

## 2024-03-11 ENCOUNTER — LAB VISIT (OUTPATIENT)
Dept: LAB | Facility: HOSPITAL | Age: 64
End: 2024-03-11
Attending: INTERNAL MEDICINE
Payer: MEDICAID

## 2024-03-11 VITALS
DIASTOLIC BLOOD PRESSURE: 72 MMHG | OXYGEN SATURATION: 99 % | HEART RATE: 51 BPM | TEMPERATURE: 98 F | HEIGHT: 66 IN | SYSTOLIC BLOOD PRESSURE: 134 MMHG | WEIGHT: 163.69 LBS | BODY MASS INDEX: 26.31 KG/M2

## 2024-03-11 DIAGNOSIS — Z12.11 SCREENING FOR COLON CANCER: ICD-10-CM

## 2024-03-11 DIAGNOSIS — Z12.31 ENCOUNTER FOR SCREENING MAMMOGRAM FOR MALIGNANT NEOPLASM OF BREAST: ICD-10-CM

## 2024-03-11 DIAGNOSIS — K21.9 GASTROESOPHAGEAL REFLUX DISEASE WITHOUT ESOPHAGITIS: ICD-10-CM

## 2024-03-11 DIAGNOSIS — E11.9 TYPE 2 DIABETES MELLITUS WITHOUT COMPLICATION, WITHOUT LONG-TERM CURRENT USE OF INSULIN: ICD-10-CM

## 2024-03-11 DIAGNOSIS — Z00.00 NORMAL PHYSICAL EXAM: ICD-10-CM

## 2024-03-11 DIAGNOSIS — E78.5 DYSLIPIDEMIA: ICD-10-CM

## 2024-03-11 DIAGNOSIS — E11.9 TYPE 2 DIABETES MELLITUS WITHOUT COMPLICATION, WITHOUT LONG-TERM CURRENT USE OF INSULIN: Primary | ICD-10-CM

## 2024-03-11 DIAGNOSIS — A04.8 H. PYLORI INFECTION: ICD-10-CM

## 2024-03-11 DIAGNOSIS — I10 ESSENTIAL HYPERTENSION: ICD-10-CM

## 2024-03-11 DIAGNOSIS — I95.9 HYPOTENSION, UNSPECIFIED HYPOTENSION TYPE: ICD-10-CM

## 2024-03-11 LAB
ERYTHROCYTE [DISTWIDTH] IN BLOOD BY AUTOMATED COUNT: 12.2 % (ref 11.5–14.5)
HCT VFR BLD AUTO: 35.6 % (ref 37–48.5)
HGB BLD-MCNC: 11.9 G/DL (ref 12–16)
MCH RBC QN AUTO: 31.4 PG (ref 27–31)
MCHC RBC AUTO-ENTMCNC: 33.4 G/DL (ref 32–36)
MCV RBC AUTO: 94 FL (ref 82–98)
PLATELET # BLD AUTO: 234 K/UL (ref 150–450)
PMV BLD AUTO: 10.4 FL (ref 9.2–12.9)
RBC # BLD AUTO: 3.79 M/UL (ref 4–5.4)
WBC # BLD AUTO: 5.63 K/UL (ref 3.9–12.7)

## 2024-03-11 PROCEDURE — 1159F MED LIST DOCD IN RCRD: CPT | Mod: CPTII,,, | Performed by: INTERNAL MEDICINE

## 2024-03-11 PROCEDURE — 99999 PR PBB SHADOW E&M-EST. PATIENT-LVL IV: CPT | Mod: PBBFAC,,, | Performed by: INTERNAL MEDICINE

## 2024-03-11 PROCEDURE — 1160F RVW MEDS BY RX/DR IN RCRD: CPT | Mod: CPTII,,, | Performed by: INTERNAL MEDICINE

## 2024-03-11 PROCEDURE — 99214 OFFICE O/P EST MOD 30 MIN: CPT | Mod: S$PBB,,, | Performed by: INTERNAL MEDICINE

## 2024-03-11 PROCEDURE — 99214 OFFICE O/P EST MOD 30 MIN: CPT | Mod: PBBFAC,PO | Performed by: INTERNAL MEDICINE

## 2024-03-11 PROCEDURE — 3066F NEPHROPATHY DOC TX: CPT | Mod: CPTII,,, | Performed by: INTERNAL MEDICINE

## 2024-03-11 PROCEDURE — 3075F SYST BP GE 130 - 139MM HG: CPT | Mod: CPTII,,, | Performed by: INTERNAL MEDICINE

## 2024-03-11 PROCEDURE — 3061F NEG MICROALBUMINURIA REV: CPT | Mod: CPTII,,, | Performed by: INTERNAL MEDICINE

## 2024-03-11 PROCEDURE — 3008F BODY MASS INDEX DOCD: CPT | Mod: CPTII,,, | Performed by: INTERNAL MEDICINE

## 2024-03-11 PROCEDURE — 3078F DIAST BP <80 MM HG: CPT | Mod: CPTII,,, | Performed by: INTERNAL MEDICINE

## 2024-03-11 PROCEDURE — 4010F ACE/ARB THERAPY RXD/TAKEN: CPT | Mod: CPTII,,, | Performed by: INTERNAL MEDICINE

## 2024-03-11 PROCEDURE — 36415 COLL VENOUS BLD VENIPUNCTURE: CPT | Performed by: INTERNAL MEDICINE

## 2024-03-11 PROCEDURE — 85027 COMPLETE CBC AUTOMATED: CPT | Performed by: INTERNAL MEDICINE

## 2024-03-11 RX ORDER — LISINOPRIL 20 MG/1
20 TABLET ORAL DAILY
Qty: 90 TABLET | Refills: 3 | Status: SHIPPED | OUTPATIENT
Start: 2024-03-11 | End: 2024-06-02 | Stop reason: SDUPTHER

## 2024-03-11 RX ORDER — ATORVASTATIN CALCIUM 40 MG/1
40 TABLET, FILM COATED ORAL DAILY
Qty: 90 TABLET | Refills: 3 | Status: SHIPPED | OUTPATIENT
Start: 2024-03-11 | End: 2024-06-02 | Stop reason: SDUPTHER

## 2024-03-11 RX ORDER — DULAGLUTIDE 0.75 MG/.5ML
0.75 INJECTION, SOLUTION SUBCUTANEOUS
Qty: 4 PEN | Refills: 11 | Status: SHIPPED | OUTPATIENT
Start: 2024-03-11 | End: 2024-06-02 | Stop reason: SDUPTHER

## 2024-03-19 ENCOUNTER — OFFICE VISIT (OUTPATIENT)
Dept: GASTROENTEROLOGY | Facility: CLINIC | Age: 64
End: 2024-03-19
Payer: MEDICAID

## 2024-03-19 VITALS
HEIGHT: 66 IN | BODY MASS INDEX: 26.72 KG/M2 | WEIGHT: 166.25 LBS | HEART RATE: 60 BPM | DIASTOLIC BLOOD PRESSURE: 70 MMHG | SYSTOLIC BLOOD PRESSURE: 154 MMHG

## 2024-03-19 DIAGNOSIS — B96.81 HELICOBACTER PYLORI GASTRITIS: Primary | ICD-10-CM

## 2024-03-19 DIAGNOSIS — K29.70 HELICOBACTER PYLORI GASTRITIS: Primary | ICD-10-CM

## 2024-03-19 PROCEDURE — 99214 OFFICE O/P EST MOD 30 MIN: CPT | Mod: S$PBB,,, | Performed by: INTERNAL MEDICINE

## 2024-03-19 PROCEDURE — 3008F BODY MASS INDEX DOCD: CPT | Mod: CPTII,,, | Performed by: INTERNAL MEDICINE

## 2024-03-19 PROCEDURE — 3061F NEG MICROALBUMINURIA REV: CPT | Mod: CPTII,,, | Performed by: INTERNAL MEDICINE

## 2024-03-19 PROCEDURE — 3077F SYST BP >= 140 MM HG: CPT | Mod: CPTII,,, | Performed by: INTERNAL MEDICINE

## 2024-03-19 PROCEDURE — 3066F NEPHROPATHY DOC TX: CPT | Mod: CPTII,,, | Performed by: INTERNAL MEDICINE

## 2024-03-19 PROCEDURE — 99213 OFFICE O/P EST LOW 20 MIN: CPT | Mod: PBBFAC | Performed by: INTERNAL MEDICINE

## 2024-03-19 PROCEDURE — 4010F ACE/ARB THERAPY RXD/TAKEN: CPT | Mod: CPTII,,, | Performed by: INTERNAL MEDICINE

## 2024-03-19 PROCEDURE — 3078F DIAST BP <80 MM HG: CPT | Mod: CPTII,,, | Performed by: INTERNAL MEDICINE

## 2024-03-19 PROCEDURE — 99999 PR PBB SHADOW E&M-EST. PATIENT-LVL III: CPT | Mod: PBBFAC,,, | Performed by: INTERNAL MEDICINE

## 2024-03-19 PROCEDURE — 1159F MED LIST DOCD IN RCRD: CPT | Mod: CPTII,,, | Performed by: INTERNAL MEDICINE

## 2024-03-19 RX ORDER — FLUTICASONE PROPIONATE 50 MCG
1 SPRAY, SUSPENSION (ML) NASAL
COMMUNITY
Start: 2024-03-14

## 2024-03-19 RX ORDER — CETIRIZINE HYDROCHLORIDE 10 MG/1
10 CAPSULE, LIQUID FILLED ORAL
COMMUNITY
Start: 2024-03-14

## 2024-03-19 RX ORDER — PROMETHAZINE HYDROCHLORIDE AND DEXTROMETHORPHAN HYDROBROMIDE 6.25; 15 MG/5ML; MG/5ML
5 SYRUP ORAL 4 TIMES DAILY PRN
COMMUNITY
Start: 2024-03-14 | End: 2024-03-21

## 2024-03-19 RX ORDER — OSELTAMIVIR PHOSPHATE 75 MG/1
75 CAPSULE ORAL
COMMUNITY
Start: 2024-03-14 | End: 2024-03-24

## 2024-03-19 NOTE — PROGRESS NOTES
Reason for visit: Anemia    HPI:  is a 63 year old lady with anemia recently underwent EGD and Colonoscopy in December 2023 upon referral from PCP. EGD revealed H.pylori infection with mild chronic inactive gastritis.      Latest Reference Range & Units 04/06/21 11:11 01/26/22 07:14 08/04/22 08:14 12/27/22 16:16 01/30/23 10:05 05/16/23 10:44 09/05/23 08:46 09/14/23 12:24 03/11/24 09:35   Hemoglobin 12.0 - 16.0 g/dL 12.4 12.1 11.0 (L) 12.2 11.8 (L) 11.6 (L) 9.7 (L) 9.9 (L) 11.9 (L)   Hematocrit 37.0 - 48.5 % 38.6 37.7 33.2 (L) 34.6 (L) 36.8 (L) 36.8 (L) 31.0 (L) 31.2 (L) 35.6 (L)   MCV 82 - 98 fL 95 96 90 88 93 96 95 98 94   MCH 27.0 - 31.0 pg 30.5 30.7 29.6 31.0 29.8 30.4 29.8 30.9 31.4 (H)   MCHC 32.0 - 36.0 g/dL 32.1 32.1 33.1 35.3 32.1 31.5 (L) 31.3 (L) 31.7 (L) 33.4   RDW 11.5 - 14.5 % 12.6 12.2 12.2 11.8 11.9 12.6 13.6 13.2 12.2   Platelet Count 150 - 450 K/uL 298 256 259 229 266 274 260 256 234      Latest Reference Range & Units 09/14/23 12:24   Iron 30 - 160 ug/dL 87   TIBC 250 - 450 ug/dL 410   Saturated Iron 20 - 50 % 21   Transferrin 200 - 375 mg/dL 277   Ferritin 20.0 - 300.0 ng/mL 193       Was treated with quadruple therapy and completed the course. Has gained weight. Denies any dysphagia, odynophagia, nausea, vomiting,  or acid regurgitation. Rare bout of heartburn, especially with acidic foods. No abdominal pains, changes in bowel pattern (usually constipated), blood/ mucus in stool or unintentional weight loss. No melena or maroon stools. No recent changes in diet or medications. No family history of IBD or Celiac disease. Brother had lung cancer, Dad with prostate cancer and another brother with stomach cancer. No alcohol (occasional) or tobacco use (none). Takes Goody's for HAs. No recent antibiotic use, travels or sick contacts. No prior history of C.diff. Not on any acid reducers.    Past medical, surgical, social and family history reviewed in epic    Medication allergies reviewed in  epic    Review of systems:    Constitutional:  No fever, no chills, no weight loss, appetite is normal  Eyes:  No visual changes or red eyes  ENT:  No odynophagia or hoarseness of voice  Cardiovascular:  No angina or palpitation  Respiratory:  No shortness breath or wheezing  Genitourinary:  No dysuria or frequency  Musculoskeletal:  No myalgias or arthralgias  Skin:  No pruritus or eczema  Neurologic:  No headache or seizures  Psychiatric:  No anxiety depression  Gastrointestinal:  See HPI    Physical exam:  Vitals see epic, awake, alert, oriented x3 in no acute distress    No physical exam done today.    Recent labs, imaging (CT renal stone protocol in 2021) and endoscopy reports reviewed.      Impression: Helicobacter pylori infection with mild chronic inactive gastritis post quadruple therapy.    Recommendations:     Check H.pylori antigen

## 2024-04-24 ENCOUNTER — OFFICE VISIT (OUTPATIENT)
Dept: OPHTHALMOLOGY | Facility: CLINIC | Age: 64
End: 2024-04-24
Attending: OPHTHALMOLOGY
Payer: MEDICAID

## 2024-04-24 DIAGNOSIS — H21.1X2 RUBEOSIS IRIDIS OF LEFT EYE: ICD-10-CM

## 2024-04-24 DIAGNOSIS — E11.9 DM TYPE 2 WITHOUT RETINOPATHY: ICD-10-CM

## 2024-04-24 DIAGNOSIS — H34.8120 CENTRAL RETINAL VEIN OCCLUSION WITH MACULAR EDEMA OF LEFT EYE: Primary | ICD-10-CM

## 2024-04-24 PROCEDURE — 4010F ACE/ARB THERAPY RXD/TAKEN: CPT | Mod: CPTII,,, | Performed by: OPHTHALMOLOGY

## 2024-04-24 PROCEDURE — 67028 INJECTION EYE DRUG: CPT | Mod: PBBFAC,PO,LT | Performed by: OPHTHALMOLOGY

## 2024-04-24 PROCEDURE — 99999PBSHW PR PBB SHADOW TECHNICAL ONLY FILED TO HB: Mod: JZ,PBBFAC,,

## 2024-04-24 PROCEDURE — 99214 OFFICE O/P EST MOD 30 MIN: CPT | Mod: 25,S$PBB,, | Performed by: OPHTHALMOLOGY

## 2024-04-24 PROCEDURE — 3066F NEPHROPATHY DOC TX: CPT | Mod: CPTII,,, | Performed by: OPHTHALMOLOGY

## 2024-04-24 PROCEDURE — 1159F MED LIST DOCD IN RCRD: CPT | Mod: CPTII,,, | Performed by: OPHTHALMOLOGY

## 2024-04-24 PROCEDURE — 99213 OFFICE O/P EST LOW 20 MIN: CPT | Mod: PBBFAC,PO | Performed by: OPHTHALMOLOGY

## 2024-04-24 PROCEDURE — 3061F NEG MICROALBUMINURIA REV: CPT | Mod: CPTII,,, | Performed by: OPHTHALMOLOGY

## 2024-04-24 PROCEDURE — 99999 PR PBB SHADOW E&M-EST. PATIENT-LVL III: CPT | Mod: PBBFAC,,, | Performed by: OPHTHALMOLOGY

## 2024-04-24 PROCEDURE — 92250 FUNDUS PHOTOGRAPHY W/I&R: CPT | Mod: PBBFAC,PO | Performed by: OPHTHALMOLOGY

## 2024-04-24 PROCEDURE — 1160F RVW MEDS BY RX/DR IN RCRD: CPT | Mod: CPTII,,, | Performed by: OPHTHALMOLOGY

## 2024-04-24 PROCEDURE — 92235 FLUORESCEIN ANGRPH MLTIFRAME: CPT | Mod: PBBFAC,PO | Performed by: OPHTHALMOLOGY

## 2024-04-24 PROCEDURE — 67028 INJECTION EYE DRUG: CPT | Mod: S$PBB,LT,, | Performed by: OPHTHALMOLOGY

## 2024-04-24 RX ADMIN — Medication 1.25 MG: at 03:04

## 2024-04-24 NOTE — PROGRESS NOTES
Subjective:       Patient ID: Torsten Trevizo is a 63 y.o. female      No chief complaint on file.    History of Present Illness  HPI    01/17/2024 by Dr.J. Hidalgo    Cc: pt reports : vision OS is blurry and getting worse w/  the floaters OS       --blurred   --eye pain   ++diplopia  --flashes/++floaters  ++H/O Headaches  --curtain/shadows/veils    POHx:   1. NSC OU  CE IOL s/p Phaco w/IOL OS- 11/14/2021                    Phaco w/IOL  OD- 9/23/2021    2. CRVO w/ME OS    3. Type II DM w/o Retinopathy    Last edited by Mushtaq Hidalgo MD on 4/24/2024  3:21 PM.        Imaging:    See report    Assessment/Plan:     1. Central retinal vein occlusion with macular edema of left eye  ME sig improvement but still some ME.  Has vision much worse than would be expected from ME alone and also atrophic thinning on OCT c/w ischemia  Today FA confirms profound ischemia and cap dropout OS  Now has rubeosis   Discussed poss of NVG and discussed RBA of injection today in OS   Rec Av OS.  Pt agrees to proceed     Avastin OS today   PRP OS next avail     - Posterior Segment OCT Retina-Both eyes    2. DM type 2 without retinopathy  Monitor.    CV risk factor control    3. Rubeosis iridis of left eye  See #1      Follow up in about 3 weeks (around 5/15/2024), or if symptoms worsen or fail to improve, for PRP OS.     Patient identified.  Timeout performed.    Risks, benefits, and alternatives to treatment were discussed in detail with the patient, including bleeding/infection (endophthalmitis)/etc.  The patient voiced understanding and wished to proceed with the procedure.  See separate consent form.    Injection Procedure Note:  Diagnosis: RVO with ME and rubeosis Left Eye    Topical Proparacaine drop placed then topical 5% Betadine  Sterile gloves used, and sterile lid speculum placed.  5% Betadine placed at injection site again prior to injection.  Avastin 1.25mg in 0.05cc Injected inferotemporally 3.5-4mm posterior to the  limbus.  Complications: None  Va at least CF at 5 feet post injection.  Retina, ONH, IOP normal after injection.    Followup as above.  Patient should return immediately PRN.  Retinal Detachment and Endophthalmitis precautions given.

## 2024-04-26 ENCOUNTER — HOSPITAL ENCOUNTER (EMERGENCY)
Facility: HOSPITAL | Age: 64
Discharge: HOME | End: 2024-04-26
Attending: EMERGENCY MEDICINE
Payer: MEDICAID

## 2024-04-26 VITALS
RESPIRATION RATE: 17 BRPM | DIASTOLIC BLOOD PRESSURE: 103 MMHG | HEIGHT: 66 IN | HEART RATE: 55 BPM | WEIGHT: 166 LBS | OXYGEN SATURATION: 98 % | BODY MASS INDEX: 26.68 KG/M2 | TEMPERATURE: 98 F | SYSTOLIC BLOOD PRESSURE: 180 MMHG

## 2024-04-26 DIAGNOSIS — H57.12 ACUTE LEFT EYE PAIN: Primary | ICD-10-CM

## 2024-04-26 PROCEDURE — 25000003 PHARM REV CODE 250: Performed by: EMERGENCY MEDICINE

## 2024-04-26 PROCEDURE — 99284 EMERGENCY DEPT VISIT MOD MDM: CPT | Mod: ,,, | Performed by: STUDENT IN AN ORGANIZED HEALTH CARE EDUCATION/TRAINING PROGRAM

## 2024-04-26 PROCEDURE — 99283 EMERGENCY DEPT VISIT LOW MDM: CPT

## 2024-04-26 RX ORDER — PREDNISOLONE ACETATE 10 MG/ML
1 SUSPENSION/ DROPS OPHTHALMIC ONCE
Status: COMPLETED | OUTPATIENT
Start: 2024-04-26 | End: 2024-04-26

## 2024-04-26 RX ADMIN — PREDNISOLONE ACETATE 1 DROP: 10 SUSPENSION/ DROPS OPHTHALMIC at 09:04

## 2024-04-26 NOTE — CONSULTS
Consultation Report  Ophthalmology Service    Date: 2024    Chief complaint/Reason for Consult: eye pain     History of Present Illness: Torsten Trevizo is a 63 y.o. female with POcularHx of CRVO in  2023 (s/p TANNER x4) and CEIOL OU who presents with left sided eye pain for the past 2 days associated with increased shadowy floaters in the temporal periphery of her vision. Most recent Avastin injection was 24 after being lost to 1 month follow up from 2024 for repeat injection for macular edema. Presented on visit  with new iris rubeosis and stable vision/IOP/symptoms. States drove to work the night of  and started having some left eye pain. Then 24 worsening left sided headache and eye pain described as throbbing. This pain continued to worsen from  to presentation today on . States has some wateriness and redness on  but that resolved. Has floaters at baseline but states she feels like she is seeing more of them. Denies eye rubbing, getting any water/liquids into the left eye, white camera flashes, purulent drainage.       POcularHx: CEIOL OU in , CRVO OS s/p TANNER x4    Current eye gtts: Denies     Family Hx: Denies family history of glaucoma, macular degeneration, or blindness. family history includes Cataracts in her father and mother; Coronary artery disease in her mother; Diabetes in her mother; Glaucoma in her daughter and daughter; Hypertension in her father and mother; Lung cancer in her brother; Prostate cancer in her father; Stomach cancer in her brother; Stroke in her father.     PMHx:  has a past medical history of Cataract, Diabetes mellitus, High cholesterol, and Hypertension.     PSurgHx:  has a past surgical history that includes  section; Hysterectomy; Cholecystectomy; Phacoemulsification of cataract (Right, 2021); Intraocular prosthesis insertion (Right, 2021); Cataract extraction w/  intraocular lens implant (Right, 2021); Eye  surgery (Right); Extraction of cataract (Left, 10/14/2021); Intraocular prosthesis insertion (Left, 10/14/2021); Esophagogastroduodenoscopy (N/A, 12/19/2023); and Colonoscopy (N/A, 12/19/2023).     Home Medications:   Prior to Admission medications    Medication Sig Start Date End Date Taking? Authorizing Provider   atorvastatin (LIPITOR) 40 MG tablet Take 1 tablet (40 mg total) by mouth once daily. 3/11/24   Diaz Yip MD   blood-glucose meter kit To check BG 1 times daily, to use with insurance preferred meter 3/13/20   Diaz Yip MD   cetirizine (ZYRTEC) 10 mg Cap Take 10 mg by mouth. 3/14/24   Provider, Historical   ciclopirox (PENLAC) 8 % Soln Apply topically nightly. 6/5/23   Sweta Castillo DPM   dulaglutide (TRULICITY) 0.75 mg/0.5 mL pen injector Inject 0.75 mg into the skin every 7 days. 3/11/24   Diaz Yip MD   fluticasone propionate (FLONASE) 50 mcg/actuation nasal spray 1 spray by Nasal route. 3/14/24   Provider, Historical   lisinopriL (PRINIVIL,ZESTRIL) 20 MG tablet Take 1 tablet (20 mg total) by mouth once daily. 3/11/24   Diaz Yip MD   meclizine (ANTIVERT) 25 mg tablet Take 1 tablet (25 mg total) by mouth 3 (three) times daily as needed for Dizziness. 12/8/23   Diaz Yip MD   meloxicam (MOBIC) 15 MG tablet Take as needed in the evening, no other NSAIDs, on a full stomach 5/16/23   Ilsa Gamez MD   ondansetron (ZOFRAN-ODT) 8 MG TbDL Take 1 tablet (8 mg total) by mouth every 8 (eight) hours as needed. 12/8/23   Diaz Yip MD        Medications this encounter:   Current Facility-Administered Medications   Medication Dose Route Frequency    bevacizumab  1.25 mg Intraocular 1 time in Clinic/HOD    prednisoLONE acetate  1 drop Left Eye Once       Allergies: is allergic to oxycodone-acetaminophen.     Social:  reports that she has never smoked. She has never used smokeless tobacco. She reports current alcohol use. She reports that she does not use drugs.      ROS: As per HPI    Ocular examination/Dilated fundus examination:  Base Eye Exam       Visual Acuity (Snellen - Linear)         Right Left    Dist sc 20/20 CF@face              Tonometry (Tonopen, 6:41 PM)         Right Left    Pressure 15 30              Gonioscopy         Right Left    Temporal  Areas open to SS with PAS    Nasal  open to SS, areas of PAS    Superior  open to SS, areas of PAS    Inferior  open to SS, areas of PAS              Pupils         Dark Light Shape React APD    Right 4 2 Round Brisk None    Left 6 5 Round Sluggish 2+              Visual Fields         Right Left    Restrictions  Total inferior temporal, superior nasal, inferior nasal deficiencies; Partial inner superior temporal deficiency              Extraocular Movement         Right Left     Full, Ortho Full, Ortho              Neuro/Psych       Oriented x3: Yes    Mood/Affect: Normal              Dilation       Both eyes: 2.5% Phenylephrine, 1% Mydriacyl @ 6:42 PM                  Slit Lamp and Fundus Exam       External Exam         Right Left    External Normal Normal              Slit Lamp Exam         Right Left    Lids/Lashes Dermatochalasis - upper lid Dermatochalasis - upper lid    Conjunctiva/Sclera Pigmentation, Pinguecula Pigmentation, Pinguecula    Cornea Clear Clear    Anterior Chamber Deep and quiet 1+ mixed cell, flare, deep    Iris Round and reactive Round, sluggish, area of NVI temporally    Lens PCIOL PCIOL    Anterior Vitreous Normal 1+ ant vitreous cell              Fundus Exam         Right Left    Disc Pink, sharp Edematous, hyperemic    C/D Ratio 0.2 0.0    Macula Flat, no holes Edematous with many IRHs and CWS    Vessels Normal caliber dilated and tortuous    Periphery No holes, tears, detachments Numerous scattered and confluent IRHs throughout the periphery, no white/creamy lesions or vitreous strands/chains/opacities, no holes/tears/detachments identified                      Assessment/Plan:     1.  Neovascular glaucoma, left eye   2. CRVO s/p IVAx4   - Pain started soon after injection on intravitreal Avastin on 4/24/24. Since then pain has slowly been worsening. Left sided headache and eye throbbing that has progressively worsened. Notes maybe a faint film over the temporal aspect of left VF.   - VA CF @face, IOP 30, EOMI, 2+ APD OS  - Anterior exam notable for 1+ mixed cell in the AC, area of faint iris rubeosis from 3-6oclock, and 1+ vitreous cell  - Gonio OS without obvious NVA but some faint vessels seen through heavy PAS  - DFE appears stable from prior examination OU. OS with edematous disc and macula and retinal hemorrhages throughout. No other areas of vitreous consolidations or retinal lesions or tears/detachments.  - Differential also includes possible early endophthalmitis though given constellation of signs/symptoms (pain soon after injection, elevated IOP, no conj injection, no stabbing pain, weak AC and vitreous reaction) neovascular glaucoma remains leading diagnosis  - Start Combigan BID OS (sample given to patient)  - Start Predforte QID OS  - 1 drop of Atropine given during exam  - Patient given strict return precautions for redness, worsening pain, worsening vision, drainage from the eye  - On call resident will contact the patient tomorrow at number provided to assess symptoms     Phone number: [phone]     Discussed and seen with Dr Corrales/Antonia Rangel MD PGY-2  [employer] Ophthalmology Resident  04/26/2024  6:45 PM

## 2024-04-26 NOTE — ED NOTES
Patient to eye clinic with optho. MD Cheng aware of patient disposition. Patient to return to ER.

## 2024-04-27 ENCOUNTER — TELEPHONE (OUTPATIENT)
Dept: OPHTHALMOLOGY | Facility: CLINIC | Age: 64
End: 2024-04-27
Payer: MEDICAID

## 2024-04-27 NOTE — TELEPHONE ENCOUNTER
Called patient to follow-up on symptoms after ER visit yesterday. Patient reports that her eye pain has resolved and her vision is slightly improved/stable. Denies eye redness, photophobia. Patient instructed to continue eye drops and triage line or present to the ER for worsening pain or worsening vision.

## 2024-04-28 NOTE — ED PROVIDER NOTES
Encounter Date: 2024       History     Chief Complaint   Patient presents with    Eye Pain     Patient presents for left eye pain and headache since have ocular injection on Wednesday.      Pt is a 64 yo female Past Medical History:  No date: Cataract  No date: Diabetes mellitus      Comment:  does not take any meds for same  No date: High cholesterol  No date: Hypertension  Presenting for R eye pain and HA since receiving eye injections for CRVO with macular edema. Ophthalmology resident present in ED to meet patient at the time I am introduced to the patient. Will take her up to clinic for further assessment. Pt agreeable with plan and not requesting for symptom meds initially.       Review of patient's allergies indicates:   Allergen Reactions    Oxycodone-acetaminophen Itching     Past Medical History:   Diagnosis Date    Cataract     Diabetes mellitus     does not take any meds for same    High cholesterol     Hypertension      Past Surgical History:   Procedure Laterality Date    CATARACT EXTRACTION W/  INTRAOCULAR LENS IMPLANT Right 2021    Dr. Ferreira     SECTION      CHOLECYSTECTOMY      COLONOSCOPY N/A 2023    Procedure: COLONOSCOPY;  Surgeon: Ramón Upton MD;  Location: [facility] ENDOSCOPY;  Service: Endoscopy;  Laterality: N/A;  WLMeds / Referral: PEG AMBRIZ / PEG /  Instructions portal - LW  - instructions to email ([email or web address]), pre call complete.  DBM    ESOPHAGOGASTRODUODENOSCOPY N/A 2023    Procedure: EGD (ESOPHAGOGASTRODUODENOSCOPY);  Surgeon: Ramón Upton MD;  Location: [facility] ENDOSCOPY;  Service: Endoscopy;  Laterality: N/A;    EXTRACTION OF CATARACT Left 10/14/2021    Procedure: EXTRACTION, CATARACT;  Surgeon: Mina Ferreira MD;  Location: [facility] OR;  Service: Ophthalmology;  Laterality: Left;  RN Phone Pre Op. 10-6-21. Covid NEGATIVE 10-13-21. Arrival 06:00 am.  [place]    EYE SURGERY Right     catartact    HYSTERECTOMY      Age 53,  Fibroids    INTRAOCULAR PROSTHESES INSERTION Right 9/23/2021    Procedure: INSERTION, IOL PROSTHESIS;  Surgeon: Mina Ferreira MD;  Location: [facility] OR;  Service: Ophthalmology;  Laterality: Right;    INTRAOCULAR PROSTHESES INSERTION Left 10/14/2021    Procedure: INSERTION, IOL PROSTHESIS;  Surgeon: Mina Ferreira MD;  Location: [facility] OR;  Service: Ophthalmology;  Laterality: Left;    PHACOEMULSIFICATION OF CATARACT Right 9/23/2021    Procedure: PHACOEMULSIFICATION, CATARACT;  Surgeon: Mina Ferreira MD;  Location: [facility] OR;  Service: Ophthalmology;  Laterality: Right;  RN Phone Pre Op 9-20-21. Covid NEGATIVE ON  9-22-21..  Arrival 05:30 am.  C A     Family History   Problem Relation Name Age of Onset    Coronary artery disease Mother      Diabetes Mother      Hypertension Mother      Cataracts Mother      Stroke Father      Hypertension Father      Prostate cancer Father      Cataracts Father      Lung cancer Brother      Stomach cancer Brother      Glaucoma Daughter      Glaucoma Daughter      Amblyopia Neg Hx      Blindness Neg Hx      Macular degeneration Neg Hx      Retinal detachment Neg Hx      Strabismus Neg Hx       Social History     Tobacco Use    Smoking status: Never    Smokeless tobacco: Never   Substance Use Topics    Alcohol use: Yes     Comment: socially    Drug use: Never     Review of Systems   Eyes:  Positive for pain.       Physical Exam     Initial Vitals [04/26/24 1650]   BP Pulse Resp Temp SpO2   (!) 183/84 60 17 99.3 °F (37.4 °C) 100 %      MAP       --         Physical Exam    Nursing note and vitals reviewed.  Constitutional: She appears well-developed and well-nourished.   HENT:   Head: Normocephalic and atraumatic.   Eyes: Conjunctivae and EOM are normal.   Neck:   Normal range of motion.  Pulmonary/Chest: No respiratory distress.   Abdominal: She exhibits no distension.   Musculoskeletal:         General: Normal range of motion.      Cervical back: Normal range of  motion.     Neurological: She is alert and oriented to person, place, and time. She has normal strength. No cranial nerve deficit. GCS score is 15. GCS eye subscore is 4. GCS verbal subscore is 5. GCS motor subscore is 6.   Skin: Skin is warm and dry. Capillary refill takes less than 2 seconds.   Psychiatric: She has a normal mood and affect. Thought content normal.         ED Course   Procedures  Labs Reviewed - No data to display       Imaging Results    None          Medications   prednisoLONE acetate 1 % ophthalmic suspension 1 drop (1 drop Left Eye Given 4/26/24 2135)     Medical Decision Making  Pt rapidly assessed. VSS. Whisked up to clinic and evaluated over the next few hours. Specialist reporting initial concern for low possibility endophthalmitis VS acute post-procedural pain. No acute evidence of infection. Will treat with 2 different eye drops and follow up closely. Ophtho to call or see tomorrow. Pt in agreement with POC and dc'd in stable condition with daughter as a .      Risk  Prescription drug management.                                      Clinical Impression:  Final diagnoses:  [H57.12] Acute left eye pain (Primary)          ED Disposition Condition    Discharge Stable          ED Prescriptions    None       Follow-up Information       Follow up With Specialties Details Why Contact Info Additional Information    [facility] - [address] [place] [address] Ophthalmology In 1 day  [address]  [facility] [address] [phone] Please arrive on the 10th floor for check-in.             Eros Cheng MD  04/28/24 9724

## 2024-05-13 ENCOUNTER — PATIENT MESSAGE (OUTPATIENT)
Dept: FAMILY MEDICINE | Facility: CLINIC | Age: 64
End: 2024-05-13
Payer: MEDICAID

## 2024-05-13 ENCOUNTER — HOSPITAL ENCOUNTER (OUTPATIENT)
Dept: RADIOLOGY | Facility: HOSPITAL | Age: 64
Discharge: HOME | End: 2024-05-13
Attending: INTERNAL MEDICINE
Payer: MEDICAID

## 2024-05-13 DIAGNOSIS — Z12.31 ENCOUNTER FOR SCREENING MAMMOGRAM FOR MALIGNANT NEOPLASM OF BREAST: ICD-10-CM

## 2024-05-13 PROCEDURE — 77067 SCR MAMMO BI INCL CAD: CPT | Mod: 26,,, | Performed by: RADIOLOGY

## 2024-05-13 PROCEDURE — 77063 BREAST TOMOSYNTHESIS BI: CPT | Mod: 26,,, | Performed by: RADIOLOGY

## 2024-05-13 PROCEDURE — 77067 SCR MAMMO BI INCL CAD: CPT | Mod: TC,PO

## 2024-05-13 PROCEDURE — 77063 BREAST TOMOSYNTHESIS BI: CPT | Mod: TC,PO

## 2024-05-14 ENCOUNTER — OFFICE VISIT (OUTPATIENT)
Dept: OPHTHALMOLOGY | Facility: CLINIC | Age: 64
End: 2024-05-14
Attending: OPHTHALMOLOGY
Payer: MEDICAID

## 2024-05-14 DIAGNOSIS — H34.8120 CENTRAL RETINAL VEIN OCCLUSION WITH MACULAR EDEMA OF LEFT EYE: Primary | ICD-10-CM

## 2024-05-14 DIAGNOSIS — H40.052 OCULAR HYPERTENSION OF LEFT EYE: ICD-10-CM

## 2024-05-14 DIAGNOSIS — H00.15 CHALAZION LEFT LOWER EYELID: ICD-10-CM

## 2024-05-14 DIAGNOSIS — H57.12 EYE PAIN, LEFT: ICD-10-CM

## 2024-05-14 DIAGNOSIS — H21.1X2 RUBEOSIS IRIDIS OF LEFT EYE: ICD-10-CM

## 2024-05-14 PROCEDURE — 4010F ACE/ARB THERAPY RXD/TAKEN: CPT | Mod: CPTII,,, | Performed by: OPHTHALMOLOGY

## 2024-05-14 PROCEDURE — 3066F NEPHROPATHY DOC TX: CPT | Mod: CPTII,,, | Performed by: OPHTHALMOLOGY

## 2024-05-14 PROCEDURE — 1159F MED LIST DOCD IN RCRD: CPT | Mod: CPTII,,, | Performed by: OPHTHALMOLOGY

## 2024-05-14 PROCEDURE — 99213 OFFICE O/P EST LOW 20 MIN: CPT | Mod: PBBFAC,PO | Performed by: OPHTHALMOLOGY

## 2024-05-14 PROCEDURE — 67228 TREATMENT X10SV RETINOPATHY: CPT | Mod: PBBFAC,PO,LT | Performed by: OPHTHALMOLOGY

## 2024-05-14 PROCEDURE — 3061F NEG MICROALBUMINURIA REV: CPT | Mod: CPTII,,, | Performed by: OPHTHALMOLOGY

## 2024-05-14 PROCEDURE — 1160F RVW MEDS BY RX/DR IN RCRD: CPT | Mod: CPTII,,, | Performed by: OPHTHALMOLOGY

## 2024-05-14 PROCEDURE — 99214 OFFICE O/P EST MOD 30 MIN: CPT | Mod: 25,S$PBB,, | Performed by: OPHTHALMOLOGY

## 2024-05-14 PROCEDURE — 99999 PR PBB SHADOW E&M-EST. PATIENT-LVL III: CPT | Mod: PBBFAC,,, | Performed by: OPHTHALMOLOGY

## 2024-05-14 NOTE — PROGRESS NOTES
Subjective:       Patient ID: Torsten Trevizo is a 63 y.o. female      Chief Complaint   Patient presents with    Follow-up     F/u from ED visit for eye pain and was originally scheduled for laser today OS     History of Present Illness  HPI     Follow-up     Additional comments: F/u from ED visit for eye pain and was originally   scheduled for laser today OS           Comments    DLS: 04/24/2024    Patient states she is still having floaters in her left eye. Patient here   for PRP OS. Patient states her LLL is very tender and itches a lot since   yesterday.    Eye pain after last injection has resolved completely with gtts.  Hasn't   taken gtts since Saturday and eye feels fine except for LLL  --blurred   --eye pain   ++diplopia  --flashes/++floaters  ++H/O Headaches  --curtain/shadows/veils    POHx:   1. NSC OU  CE IOL s/p Phaco w/IOL OS- 11/14/2021                    Phaco w/IOL  OD- 9/23/2021    2. CRVO w/ME OS    3. Type II DM w/o Retinopathy            Last edited by Mushtaq Hidalgo MD on 5/14/2024  5:45 PM.          Assessment/Plan:     1. Central retinal vein occlusion with macular edema of left eye  2. Rubeosis iridis of left eye    Can proceed with PRP OS today given below    Risks, benefits, and alternatives to treatment were discussed in detail with the patient.  The patient voiced understanding and wished to proceed with the procedure.  See separate consent form.    - Pan Retinal Photocoagulation - OS - Left Eye    3. Eye pain, left  4. Ocular hypertension, left  Totally resolved since ED visit  Off gtts and comfortable  More likely post inj inflammation than NVG since resolved off gtts but will follow    5. Chalazion left lower eyelid  Discussed  Hot compresses  Use PF 0/4 x 2-3 days (pt already has gtts)  Cellulitis precautions discussed.  RTC immed if any worsening    Follow up in about 2 weeks (around 5/28/2024), or if symptoms worsen or fail to improve, for PRP OS.

## 2024-05-30 ENCOUNTER — TELEPHONE (OUTPATIENT)
Dept: FAMILY MEDICINE | Facility: CLINIC | Age: 64
End: 2024-05-30
Payer: MEDICAID

## 2024-05-30 NOTE — TELEPHONE ENCOUNTER
----- Message from Carolin Jackson sent at 5/30/2024 10:28 AM CDT -----  Regarding: Tanya [phone]  Type: Patient Call Back     Who called: Tanya called from [facility] [address] [place], [place] [address] [address]  [address] [place] [address]  Phone: [phone] Fax: [phone]     What is the request in detail: Pharmacist is requesting a DX code for the dulaglutide (TRULICITY) 0.75 mg/0.5 mL pen injector RX.     Can the clinic reply by [facility]?     Would the patient rather a call back or a response via [facility]?     Best call back number:     Additional Information:

## 2024-06-02 DIAGNOSIS — I95.9 HYPOTENSION, UNSPECIFIED HYPOTENSION TYPE: ICD-10-CM

## 2024-06-02 DIAGNOSIS — E78.5 DYSLIPIDEMIA: ICD-10-CM

## 2024-06-02 DIAGNOSIS — E11.9 TYPE 2 DIABETES MELLITUS WITHOUT COMPLICATION, WITHOUT LONG-TERM CURRENT USE OF INSULIN: ICD-10-CM

## 2024-06-02 RX ORDER — ATORVASTATIN CALCIUM 40 MG/1
40 TABLET, FILM COATED ORAL DAILY
Qty: 90 TABLET | Refills: 3 | Status: SHIPPED | OUTPATIENT
Start: 2024-06-02

## 2024-06-02 RX ORDER — LISINOPRIL 20 MG/1
20 TABLET ORAL DAILY
Qty: 90 TABLET | Refills: 3 | Status: SHIPPED | OUTPATIENT
Start: 2024-06-02

## 2024-06-02 RX ORDER — DULAGLUTIDE 0.75 MG/.5ML
0.75 INJECTION, SOLUTION SUBCUTANEOUS
Qty: 4 PEN | Refills: 11 | Status: SHIPPED | OUTPATIENT
Start: 2024-06-02

## 2024-06-02 NOTE — TELEPHONE ENCOUNTER
Care Due:                  Date            Visit Type   Department     Provider  --------------------------------------------------------------------------------                                [facility]                              PRIMARY      MED/ INTERNAL  Last Visit: 03-      CARE ([facility])   MED/ QUE Yip                              [facility]                              PRIMARY      MED/ INTERNAL  Next Visit: 07-      CARE ([facility])   MED/ QUE Yip                                                            Last  Test          Frequency    Reason                     Performed    Due Date  --------------------------------------------------------------------------------    HBA1C.......  6 months...  dulaglutide..............  09-   03-    Health [facility] Embedded Care Due Messages. Reference number: [id number].   6/02/2024 7:16:37 AM CDT

## 2024-06-02 NOTE — TELEPHONE ENCOUNTER
No care due was identified.  [facility] Embedded Care Due Messages. Reference number: [id number].   6/02/2024 7:17:55 AM CDT   Abdominal Pain, N/V/D

## 2024-06-02 NOTE — TELEPHONE ENCOUNTER
No care due was identified.  Health [facility] Embedded Care Due Messages. Reference number: [id number].   6/02/2024 7:17:15 AM CDT

## 2024-06-11 ENCOUNTER — OFFICE VISIT (OUTPATIENT)
Dept: OPHTHALMOLOGY | Facility: CLINIC | Age: 64
End: 2024-06-11
Attending: OPHTHALMOLOGY
Payer: MEDICAID

## 2024-06-11 DIAGNOSIS — H34.8120 CENTRAL RETINAL VEIN OCCLUSION WITH MACULAR EDEMA OF LEFT EYE: Primary | ICD-10-CM

## 2024-06-11 PROCEDURE — 67228 TREATMENT X10SV RETINOPATHY: CPT | Mod: PBBFAC,PO,LT | Performed by: OPHTHALMOLOGY

## 2024-06-11 PROCEDURE — 99499 UNLISTED E&M SERVICE: CPT | Mod: S$PBB,,, | Performed by: OPHTHALMOLOGY

## 2024-06-11 PROCEDURE — 67228 TREATMENT X10SV RETINOPATHY: CPT | Mod: S$PBB,LT,, | Performed by: OPHTHALMOLOGY

## 2024-06-11 PROCEDURE — 99999 PR PBB SHADOW E&M-EST. PATIENT-LVL II: CPT | Mod: PBBFAC,,, | Performed by: OPHTHALMOLOGY

## 2024-06-11 PROCEDURE — 99212 OFFICE O/P EST SF 10 MIN: CPT | Mod: PBBFAC,PO,25 | Performed by: OPHTHALMOLOGY

## 2024-06-11 RX ORDER — FLUORESCEIN 500 MG/ML
5 INJECTION INTRAVENOUS ONCE
Status: DISCONTINUED | OUTPATIENT
Start: 2024-06-11 | End: 2024-06-11

## 2024-06-11 NOTE — PROGRESS NOTES
Subjective:       Patient ID: Torsten Trevizo is a 63 y.o. female      Chief Complaint   Patient presents with    Spots and/or Floaters     History of Present Illness  HPI    DLS: 05/14/2024.      Patient states she is seeing an increase in floaters OS since last exam.    No changes in vision OD.   OU itching throughout the day x 2 wks.     --blurred   --eye pain   ++diplopia  --flashes/++floaters  ++H/O Headaches  --curtain/shadows/veils    POHx:   1. NSC OU  CE IOL s/p Phaco w/IOL OS- 11/14/2021                    Phaco w/IOL  OD- 9/23/2021    2. CRVO w/ME OS    3. Type II DM w/o Retinopathy    Last edited by Mushtaq Hidalgo MD on 6/11/2024  8:30 AM.          Assessment/Plan:     1. Central retinal vein occlusion with macular edema of left eye  2. Rubeosis iridis of left eye    Can proceed with PRP OS today as planned    Risks, benefits, and alternatives to treatment were discussed in detail with the patient.  The patient voiced understanding and wished to proceed with the procedure.  See separate consent form.    - Pan Retinal Photocoagulation - OS - Left Eye  PRP laser performed today OS without complication.  See separate scanned laser sheet for details.  RTC 1 month(s), sooner PRN for fu    Follow up in about 1 month (around 7/11/2024), or if symptoms worsen or fail to improve, for OCT Mac, Dilated examination.

## 2024-06-25 ENCOUNTER — E-VISIT (OUTPATIENT)
Dept: FAMILY MEDICINE | Facility: CLINIC | Age: 64
End: 2024-06-25
Payer: MEDICAID

## 2024-06-25 DIAGNOSIS — R53.83 FATIGUE, UNSPECIFIED TYPE: Primary | ICD-10-CM

## 2024-06-25 DIAGNOSIS — A04.8 H. PYLORI INFECTION: ICD-10-CM

## 2024-06-25 DIAGNOSIS — I10 ESSENTIAL HYPERTENSION: ICD-10-CM

## 2024-06-25 DIAGNOSIS — E11.9 TYPE 2 DIABETES MELLITUS WITHOUT COMPLICATION, WITHOUT LONG-TERM CURRENT USE OF INSULIN: ICD-10-CM

## 2024-06-26 NOTE — PROGRESS NOTES
Patient ID: Torsten Trevizo is a 63 y.o. female.    This note was created by combination of typed  and M-Modal dictation.  Transcription errors may be present.  If there are any questions, please contact me.    Assessment and Plan:   1. Fatigue, unspecified type  2. Essential hypertension hx of hctz, amlodipine, lisinopril, propranolol  3. Type 2 diabetes mellitus without complication, without long-term current use of insulin hx metformin with GI SE; saxagliptin expensive  -unclear etiology.  Labs done in March referral unremarkable.  Her weight has been stable at least going up till March.  On low-dose Trulicity.  On low-dose ACE inhibitor.    I have asked her to update me regarding blood pressures and blood sugars.  Also if she is noted any basic changes like sleep changes.  Has an upcoming follow-up with me in September with labs    4. H. pylori infection  Needs to perform stool H pylori to check for clearance        No orders of the defined types were placed in this encounter.           No follow-ups on file. or sooner as needed.        E-Visit Time Tracking:    Day 1, 11 min               274}    Follow Up: No follow-ups on file.    Subjective:   Patient ID: Torsten Trevizo is a 63 y.o. female.    Chief Complaint: Fatigue                    274}    History of Present Illness:  The patient initiated a request through [employer] on 6/25/2024 for evaluation and management with a chief complaint of Fatigue     Diabetes.  Trulicity possible side effect of fatigue.  Offered to change, she ultimately decided to stay on Trulicity.  Want her to reestablish with digital monitoring.  Hypertension, reestablish with digital monitoring. with 40 lb weight loss I suspect her sensation of dizziness and lightheadedness are from low BP.  Stop HCTZ.  Stay on lisinopril 20.  Hyperlipidemia statin.  Hypertension    Anemia subsequent diagnosis of H pylori  12/19/23 colonoscopy normal repeat 10 years  12/19/23 EGD normal  stomach, duodenum.  Path showing Brunner's gland hyperplasia.  H pylori positive    Last visit 3/11/24  DM, trulicity low dose. Notes chronic constipation which preceded Trulicity.  Takes Mg citrate for this.   HTN stable off HCTZ. On lisinopril.  History H pylori has follow up with GI    Saw GI 3/19/24. Plan is stool h pylori    Saw ophtho 4/24/24. Central retinal vein occlusion L eye    Message from pt 5/13 c/o fatigue. Recommended eval.    3/11/24 CBC mild anemia  9/2023 normal ferritin    I evaluated the questionnaire submission on 06/27/2024 .    E-visit Questionnaire:  [facility] Peq Evisit General    6/25/2024  6:57 PM CDT - Filed by Patient   Do you agree to participate in an E-Visit? Yes   If you have any of the following symptoms, please present to your local emergency room or call 911:  I acknowledge   What is the main issue you would like addressed today? TIREDNESS   Please describe your symptoms Hi Dr Yip lately i been EXTREMELY TIRED especially in the evening time, just was wonder can i take B-12 & B-6 vitamins for more entergy, & if not can you recommend me a vitamin or something that woll give me some entergy   Where is your problem located? Body   How severe are your symptoms? Severe   Have you had these symptoms before? Yes   How long have you been having these symptoms? For more than a month   Please list any medications or treatments you have used for your condition and indicate if it was effective or not.    What makes this feel better? SLEEP   What makes this feel worse? Driving   Are these symptoms related to a condition that you currently have? I am not sure   What is the condition? Diabetes   When were you last seen for this condition?    Please describe any probable cause for these symptoms Fatigue, lack of eating   Provide any additional information you feel is important.    Please attach any relevant images or files    Are you able to take your vital signs? No          Problem List:  Active  Problem List with Overview Notes    Diagnosis Date Noted    H. pylori infection 03/08/2024 12/19/23 EGD normal stomach, duodenum.  Path showing Brunner's gland hyperplasia.  H pylori positive      Nuclear sclerotic cataract of left eye 10/14/2021    Nuclear sclerotic cataract of right eye 09/23/2021    Screening for colon cancer 12/19/23 colonoscopy normal repeat 10 years 06/01/2020 11/21/2016 colonoscopy normal repeat 10 years.  12/19/23 colonoscopy normal repeat 10 years      Primary insomnia hx of melatonin without relief; hx of trazodone 03/13/2020    GERD (gastroesophageal reflux disease) 01/29/2016    Dyslipidemia hx of crestor, lipitor 10/19/2015    Headache chronic with hx of topamax hx of propranolol 10/19/2015    Essential hypertension hx of hctz, amlodipine, lisinopril, propranolol 11/20/2014    Type 2 diabetes mellitus without complication, without long-term current use of insulin hx metformin with GI SE; saxagliptin expensive 11/20/2014        Recent Labs Obtained:  No visits with results within 7 Day(s) from this visit.   Latest known visit with results is:   Lab Visit on 03/11/2024   Component Date Value Ref Range Status    WBC 03/11/2024 5.63  3.90 - 12.70 K/uL Final    RBC 03/11/2024 3.79 (L)  4.00 - 5.40 M/uL Final    Hemoglobin 03/11/2024 11.9 (L)  12.0 - 16.0 g/dL Final    Hematocrit 03/11/2024 35.6 (L)  37.0 - 48.5 % Final    MCV 03/11/2024 94  82 - 98 fL Final    MCH 03/11/2024 31.4 (H)  27.0 - 31.0 pg Final    MCHC 03/11/2024 33.4  32.0 - 36.0 g/dL Final    RDW 03/11/2024 12.2  11.5 - 14.5 % Final    Platelets 03/11/2024 234  150 - 450 K/uL Final    MPV 03/11/2024 10.4  9.2 - 12.9 fL Final         ALLERGIES AND MEDICATIONS: updated and reviewed.  Review of patient's allergies indicates:   Allergen Reactions    Oxycodone-acetaminophen Itching       Medication List with Changes/Refills   Current Medications    ATORVASTATIN (LIPITOR) 40 MG TABLET    Take 1 tablet (40 mg total) by mouth  once daily.    BLOOD-GLUCOSE METER KIT    To check BG 1 times daily, to use with insurance preferred meter    CETIRIZINE (ZYRTEC) 10 MG CAP    Take 10 mg by mouth.    CICLOPIROX (PENLAC) 8 % SOLN    Apply topically nightly.    DULAGLUTIDE (TRULICITY) 0.75 MG/0.5 ML PEN INJECTOR    Inject 0.75 mg into the skin every 7 days.    FLUTICASONE PROPIONATE (FLONASE) 50 MCG/ACTUATION NASAL SPRAY    1 spray by Nasal route.    LISINOPRIL (PRINIVIL,ZESTRIL) 20 MG TABLET    Take 1 tablet (20 mg total) by mouth once daily.    MECLIZINE (ANTIVERT) 25 MG TABLET    Take 1 tablet (25 mg total) by mouth 3 (three) times daily as needed for Dizziness.    MELOXICAM (MOBIC) 15 MG TABLET    Take as needed in the evening, no other NSAIDs, on a full stomach    ONDANSETRON (ZOFRAN-ODT) 8 MG TBDL    Take 1 tablet (8 mg total) by mouth every 8 (eight) hours as needed.

## 2024-06-27 DIAGNOSIS — E11.9 TYPE 2 DIABETES MELLITUS WITHOUT COMPLICATION, WITHOUT LONG-TERM CURRENT USE OF INSULIN: ICD-10-CM

## 2024-06-27 RX ORDER — DULAGLUTIDE 0.75 MG/.5ML
0.75 INJECTION, SOLUTION SUBCUTANEOUS
Qty: 4 PEN | Refills: 5 | Status: SHIPPED | OUTPATIENT
Start: 2024-06-27

## 2024-06-27 NOTE — TELEPHONE ENCOUNTER
Care Due:                  Date            Visit Type   Department     Provider  --------------------------------------------------------------------------------                                [facility]                              PRIMARY      MED/ INTERNAL  Last Visit: 03-      CARE ([facility])   MED/ PEDS      Diaz Yip  Next Visit: None Scheduled  None         None Found                                                            Last  Test          Frequency    Reason                     Performed    Due Date  --------------------------------------------------------------------------------    CMP.........  12 months..  atorvastatin, lisinopriL,   09- 09-                             meloxicam................    Lipid Panel.  12 months..  atorvastatin.............  09- 09-    Health [facility] Embedded Care Due Messages. Reference number: [id number].   6/27/2024 10:08:43 AM CDT

## 2024-06-30 DIAGNOSIS — E11.9 TYPE 2 DIABETES MELLITUS WITHOUT COMPLICATION, WITHOUT LONG-TERM CURRENT USE OF INSULIN: ICD-10-CM

## 2024-06-30 RX ORDER — DULAGLUTIDE 0.75 MG/.5ML
0.75 INJECTION, SOLUTION SUBCUTANEOUS
Qty: 4 PEN | Refills: 5 | Status: SHIPPED | OUTPATIENT
Start: 2024-06-30 | End: 2024-07-02 | Stop reason: SDUPTHER

## 2024-06-30 NOTE — TELEPHONE ENCOUNTER
No care due was identified.  [facility] Embedded Care Due Messages. Reference number: [id number].   6/30/2024 8:15:19 AM CDT

## 2024-07-01 ENCOUNTER — E-VISIT (OUTPATIENT)
Dept: FAMILY MEDICINE | Facility: CLINIC | Age: 64
End: 2024-07-01
Payer: MEDICAID

## 2024-07-01 DIAGNOSIS — R53.83 FATIGUE, UNSPECIFIED TYPE: Primary | ICD-10-CM

## 2024-07-01 NOTE — PROGRESS NOTES
Patient ID: Torsten Trevizo is a 63 y.o. female.    This note was created by combination of typed  and M-Modal dictation.  Transcription errors may be present.  If there are any questions, please contact me.    Assessment and Plan:   There are no diagnoses linked to this encounter.      No orders of the defined types were placed in this encounter.           No follow-ups on file. or sooner as needed.        E-Visit Time Trackin}    Follow Up: No follow-ups on file.    Subjective:   Patient ID: Torsten Trevizo is a 63 y.o. female.    Chief Complaint: No chief complaint on file.                    274}    History of Present Illness:  The patient initiated a request through [employer] on 2024 for evaluation and management with a chief complaint of No chief complaint on file.     Pt made e-visit about same complaint 24. She did not read or respond to follow up questions. Will direct her back to original message. Will need in-person evaluation if responses do not further clarify situation.    I evaluated the questionnaire submission on 2024 .    E-visit Questionnaire:  Ohs Peq Evisit General    2024  3:32 PM CDT - Filed by Patient   Do you agree to participate in an E-Visit? Yes   If you have any of the following symptoms, please present to your local emergency room or call 911:  I acknowledge   What is the main issue you would like addressed today? Tiredness   Please describe your symptoms Im [email or web address] especially in the evening time   Where is your problem located? Body   How severe are your symptoms? Severe   Have you had these symptoms before? No   How long have you been having these symptoms? For more than a month   Please list any medications or treatments you have used for your condition and indicate if it was effective or not. None   What makes this feel better? Sleeping   What makes this feel worse? Working   Are these symptoms related to a condition that you  currently have? I am not sure   What is the condition?    When were you last seen for this condition? 6/30/2024   Please describe any probable cause for these symptoms I don't kno   Provide any additional information you feel is important.    Please attach any relevant images or files    Are you able to take your vital signs? No          Problem List:  Active Problem List with Overview Notes    Diagnosis Date Noted    H. pylori infection 03/08/2024 12/19/23 EGD normal stomach, duodenum.  Path showing Brunner's gland hyperplasia.  H pylori positive      Nuclear sclerotic cataract of left eye 10/14/2021    Nuclear sclerotic cataract of right eye 09/23/2021    Screening for colon cancer 12/19/23 colonoscopy normal repeat 10 years 06/01/2020 11/21/2016 colonoscopy normal repeat 10 years.  12/19/23 colonoscopy normal repeat 10 years      Primary insomnia hx of melatonin without relief; hx of trazodone 03/13/2020    GERD (gastroesophageal reflux disease) 01/29/2016    Dyslipidemia hx of crestor, lipitor 10/19/2015    Headache chronic with hx of topamax hx of propranolol 10/19/2015    Essential hypertension hx of hctz, amlodipine, lisinopril, propranolol 11/20/2014    Type 2 diabetes mellitus without complication, without long-term current use of insulin hx metformin with GI SE; saxagliptin expensive 11/20/2014        Recent Labs Obtained:  No visits with results within 7 Day(s) from this visit.   Latest known visit with results is:   Lab Visit on 03/11/2024   Component Date Value Ref Range Status    WBC 03/11/2024 5.63  3.90 - 12.70 K/uL Final    RBC 03/11/2024 3.79 (L)  4.00 - 5.40 M/uL Final    Hemoglobin 03/11/2024 11.9 (L)  12.0 - 16.0 g/dL Final    Hematocrit 03/11/2024 35.6 (L)  37.0 - 48.5 % Final    MCV 03/11/2024 94  82 - 98 fL Final    MCH 03/11/2024 31.4 (H)  27.0 - 31.0 pg Final    MCHC 03/11/2024 33.4  32.0 - 36.0 g/dL Final    RDW 03/11/2024 12.2  11.5 - 14.5 % Final    Platelets 03/11/2024 234  150  - 450 K/uL Final    MPV 03/11/2024 10.4  9.2 - 12.9 fL Final         ALLERGIES AND MEDICATIONS: updated and reviewed.  Review of patient's allergies indicates:   Allergen Reactions    Oxycodone-acetaminophen Itching       Medication List with Changes/Refills   Current Medications    ATORVASTATIN (LIPITOR) 40 MG TABLET    Take 1 tablet (40 mg total) by mouth once daily.    BLOOD-GLUCOSE METER KIT    To check BG 1 times daily, to use with insurance preferred meter    CETIRIZINE (ZYRTEC) 10 MG CAP    Take 10 mg by mouth.    CICLOPIROX (PENLAC) 8 % SOLN    Apply topically nightly.    DULAGLUTIDE (TRULICITY) 0.75 MG/0.5 ML PEN INJECTOR    Inject 0.75 mg into the skin every 7 days.    FLUTICASONE PROPIONATE (FLONASE) 50 MCG/ACTUATION NASAL SPRAY    1 spray by Nasal route.    LISINOPRIL (PRINIVIL,ZESTRIL) 20 MG TABLET    Take 1 tablet (20 mg total) by mouth once daily.    MECLIZINE (ANTIVERT) 25 MG TABLET    Take 1 tablet (25 mg total) by mouth 3 (three) times daily as needed for Dizziness.    MELOXICAM (MOBIC) 15 MG TABLET    Take as needed in the evening, no other NSAIDs, on a full stomach    ONDANSETRON (ZOFRAN-ODT) 8 MG TBDL    Take 1 tablet (8 mg total) by mouth every 8 (eight) hours as needed.

## 2024-07-02 DIAGNOSIS — E11.9 TYPE 2 DIABETES MELLITUS WITHOUT COMPLICATION, WITHOUT LONG-TERM CURRENT USE OF INSULIN: ICD-10-CM

## 2024-07-02 NOTE — TELEPHONE ENCOUNTER
No care due was identified.  Health [facility] Embedded Care Due Messages. Reference number: [id number].   7/02/2024 4:04:10 PM CDT

## 2024-07-03 RX ORDER — DULAGLUTIDE 0.75 MG/.5ML
0.75 INJECTION, SOLUTION SUBCUTANEOUS
Qty: 4 PEN | Refills: 5 | Status: SHIPPED | OUTPATIENT
Start: 2024-07-03

## 2024-07-07 DIAGNOSIS — E11.9 TYPE 2 DIABETES MELLITUS WITHOUT COMPLICATION, WITHOUT LONG-TERM CURRENT USE OF INSULIN: ICD-10-CM

## 2024-07-07 RX ORDER — DULAGLUTIDE 0.75 MG/.5ML
0.75 INJECTION, SOLUTION SUBCUTANEOUS
Qty: 4 PEN | Refills: 5 | Status: SHIPPED | OUTPATIENT
Start: 2024-07-07

## 2024-07-07 NOTE — TELEPHONE ENCOUNTER
No care due was identified.  Health [facility] Embedded Care Due Messages. Reference number: [id number].   7/07/2024 6:50:43 AM CDT

## 2024-07-14 NOTE — PROGRESS NOTES
This note was created by combination of typed  and M-Modal dictation.  Transcription errors may be present.  If there are any questions, please contact me.    Assessment and Plan:   Assessment and Plan    Fatigue, unspecified type  -had complained of fatigue ever since starting Trulicity.  More recent anemia with a relatively recent diagnosis of H pylori.  Check labs, check CBC, ferritin, retic count.  Stool H pylori sample was submitted this morning, results pending  In case this is idiosyncratic side effect of Trulicity, will try her with Ozempic.  And increase the dose  -     Comprehensive Metabolic Panel; Future; Expected date: 07/16/2024  -     CBC Without Differential; Future; Expected date: 07/16/2024  -     Ferritin; Future; Expected date: 07/16/2024  -     RETICULOCYTES; Future; Expected date: 07/15/2024    Atypical chest pain  -atypical chest pain episodes may last for a minute, not reliably related to exertion but does have family history of coronary artery disease.  No evidence of volume overload today but will have her see Cardiology for evaluation  -     EKG 12-lead  -     Ambulatory referral/consult to Cardiology; Future; Expected date: 07/22/2024    Nonallergic rhinitis  -recent exacerbation of symptoms, will refill Zyrtec and Flonase  -     cetirizine (ZYRTEC) 10 mg Cap; Take 10 mg by mouth Daily.  Dispense: 30 capsule; Refill: 11  -     fluticasone propionate (FLONASE) 50 mcg/actuation nasal spray; 1 spray (50 mcg total) by Each Nostril route once daily.  Dispense: 16 g; Refill: 11    Type 2 diabetes mellitus without complication, without long-term current use of insulin hx metformin with GI SE; saxagliptin expensive  Overweight  -notes appetite is increasing with subsequent weight gain.  Has been on Trulicity low-dose longstanding.  Possible side effect of fatigue  Will try her with Ozempic, change her to 0.5 mg for the 1st month and then 1 mg maintenance.  See if can help out with  diabetes, curbing appetite and weight loss  -     semaglutide (OZEMPIC) 0.25 mg or 0.5 mg (2 mg/3 mL) pen injector; Inject 0.5 mg into the skin every 7 days.  Dispense: 3 mL; Refill: 0  -     semaglutide (OZEMPIC) 1 mg/dose (2 mg/1.5 mL) PnIj; Inject 1 mg into the skin every 7 days. Month 2 onward  Dispense: 4 each; Refill: 2    Essential hypertension hx of hctz, amlodipine, lisinopril, propranolol  -BP today good.  No changes.    Dyslipidemia hx of crestor, lipitor  -on statin no changes    Gastroesophageal reflux disease without esophagitis  -off of PPI    Well woman exam  -due for follow-up with gynecology.  Status post DEMETRIA but would benefit from routine Gyn exam.  -     Ambulatory referral/consult to Gynecology; Future; Expected date: 2024          Medications Discontinued During This Encounter   Medication Reason    meloxicam (MOBIC) 15 MG tablet Patient no longer taking       meds sent this encounter:     Medications Ordered This Encounter   Medications    cetirizine (ZYRTEC) 10 mg Cap     Sig: Take 10 mg by mouth Daily.     Dispense:  30 capsule     Refill:  11    fluticasone propionate (FLONASE) 50 mcg/actuation nasal spray     Si spray (50 mcg total) by Each Nostril route once daily.     Dispense:  16 g     Refill:  11    semaglutide (OZEMPIC) 0.25 mg or 0.5 mg (2 mg/3 mL) pen injector     Sig: Inject 0.5 mg into the skin every 7 days.     Dispense:  3 mL     Refill:  0     For month 1    semaglutide (OZEMPIC) 1 mg/dose (2 mg/1.5 mL) PnIj     Sig: Inject 1 mg into the skin every 7 days. Month 2 onward     Dispense:  4 each     Refill:  2        Follow Up:  Follow up in 3 months with pre visit labs  Future Appointments   Date Time Provider Department Center   7/15/2024  3:45 PM EDDIE COHEN   2024 10:00 AM Abdoulaye Alvarez MD [facility] OBN [facility]   10/9/2024  8:15 AM EDDIE COHEN   10/16/2024 11:00 AM Diaz Yip [facility]   10/18/2024  10:00 AM Danisha Workman MD [facility] CARDIO [facility]         Subjective:   Subjective   Chief Complaint   Patient presents with    Fatigue       HPI  Torsten is a 64 y.o. female.    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     Employer: [employer]      Social History     Social History Narrative    Not on file       No LMP recorded (lmp unknown). Patient has had a hysterectomy.    Last appointment with this clinic was 7/1/2024. Last visit with me 7/1/2024   To summarize last visit and events leading up to today:  Diabetes.  Trulicity possible side effect of fatigue.  Offered to change, she ultimately decided to stay on Trulicity.  Want her to reestablish with digital monitoring.  Hypertension, reestablish with digital monitoring. with 40 lb weight loss I suspect her sensation of dizziness and lightheadedness are from low BP.  Stop HCTZ.  Stay on lisinopril 20.  Hyperlipidemia statin.  Hypertension    Anemia subsequent diagnosis of H pylori  12/19/23 colonoscopy normal repeat 10 years  12/19/23 EGD normal stomach, duodenum.  Path showing Brunner's gland hyperplasia.  H pylori positive     Last visit 3/11/24  DM, trulicity low dose. Notes chronic constipation which preceded Trulicity.  Takes Mg citrate for this.   HTN stable off HCTZ. On lisinopril.  History H pylori has follow up with GI     Saw GI 3/19/24. Plan is stool h pylori     Saw ophtho 4/24/24. Central retinal vein occlusion L eye     Message from pt 5/13 c/o fatigue. Recommended eval.     3/11/24 CBC mild anemia  9/2023 normal ferritin     E-visit 6/25 c/o fatigue.  Needs evaluation.    Needs stool H pylori    Today's visit:  A few issues   Fatigue.  On review, she had complained of fatigue ever since starting the Trulicity.   History of anemia with subsequent EGD with diagnosis of H pylori.  On subsequent follow-up labs in March after diagnosis and treatment, hemoglobin still technically anemic though better compared to September.  She just dropped off her stool H pylori test this morning.  No fevers, no chills.    Her appetite has increase in her weight is going up if anything.    Respiratory infection type symptoms.    She has been having some headaches which she attributes to allergies.    Regarding allergies, recently restarted on Zyrtec and Flonase and is requesting refills on these medications.  Gets occasional epistaxis.  She is around cats but does not thinks that she is allergic to them.    Notes appetite increased  Weight going up  She was taking the Trulicity low-dose.  Discussed that fatigue could be an idiosyncratic side effect of it were could be due to alternate etiology.  But I am going to try her with Ozempic to see if we can control her blood sugars and see if it can help out with weight loss    Notes chronic constipation.  That has been lifelong.  She may take Mag citrate as needed but typically does not take a stool softener or fiber supplement.    On review of systems, gets episodic sharp chest pain that may last for a minute.  The 1st time it happened was with physical activity but since then may happen at random times.  The last time it happened was when she was driving.  May last about a minute.  Sometimes it makes her stop what she is doing.  She notes a family history of coronary artery disease.      Patient Care Team:  Diaz Yip MD as PCP - General (Internal Medicine)  Leslie Irby MA as Care Coordinator      Patient Active Problem List    Diagnosis Date Noted    H. pylori infection 03/08/2024 12/19/23 EGD normal stomach, duodenum.  Path showing Brunner's gland hyperplasia.  H pylori positive      Nuclear sclerotic cataract of left eye 10/14/2021    Nuclear sclerotic cataract of right eye  09/23/2021    Screening for colon cancer 12/19/23 colonoscopy normal repeat 10 years 06/01/2020 11/21/2016 colonoscopy normal repeat 10 years.  12/19/23 colonoscopy normal repeat 10 years      Primary insomnia hx of melatonin without relief; hx of trazodone 03/13/2020    GERD (gastroesophageal reflux disease) 01/29/2016    Dyslipidemia hx of crestor, lipitor 10/19/2015    Headache chronic with hx of topamax hx of propranolol 10/19/2015    Essential hypertension hx of hctz, amlodipine, lisinopril, propranolol 11/20/2014    Type 2 diabetes mellitus without complication, without long-term current use of insulin hx metformin with GI SE; saxagliptin expensive 11/20/2014       PAST MEDICAL PROBLEMS, PAST SURGICAL HISTORY: please see relevant portions of the electronic medical record    ALLERGIES AND MEDICATIONS: updated and reviewed.  Medication List with Changes/Refills   Current Medications    ATORVASTATIN (LIPITOR) 40 MG TABLET    Take 1 tablet (40 mg total) by mouth once daily.    BLOOD-GLUCOSE METER KIT    To check BG 1 times daily, to use with insurance preferred meter    CETIRIZINE (ZYRTEC) 10 MG CAP    Take 10 mg by mouth.    CICLOPIROX (PENLAC) 8 % SOLN    Apply topically nightly.    DULAGLUTIDE (TRULICITY) 0.75 MG/0.5 ML PEN INJECTOR    Inject 0.75 mg into the skin every 7 days.    FLUTICASONE PROPIONATE (FLONASE) 50 MCG/ACTUATION NASAL SPRAY    1 spray by Nasal route.    LISINOPRIL (PRINIVIL,ZESTRIL) 20 MG TABLET    Take 1 tablet (20 mg total) by mouth once daily.    MECLIZINE (ANTIVERT) 25 MG TABLET    Take 1 tablet (25 mg total) by mouth 3 (three) times daily as needed for Dizziness.    ONDANSETRON (ZOFRAN-ODT) 8 MG TBDL    Take 1 tablet (8 mg total) by mouth every 8 (eight) hours as needed.   Discontinued Medications    MELOXICAM (MOBIC) 15 MG TABLET    Take as needed in the evening, no other NSAIDs, on a full stomach         Objective:   Objective   Physical Exam   Vitals:    07/15/24 1033   BP: 122/72  "  BP Location: Right arm   Patient Position: Sitting   BP Method: Medium (Manual)   Pulse: 67   Temp: 98.2 °F (36.8 °C)   TempSrc: Oral   SpO2: 98%   Weight: 81.4 kg (179 lb 5.5 oz)   Height: 5' 6" (1.676 m)    Body mass index is 28.95 kg/m².  Weight: 81.4 kg (179 lb 5.5 oz)   Height: 5' 6" (167.6 cm)     Physical Exam  Constitutional:       General: She is not in acute distress.     Appearance: She is well-developed.   Eyes:      Extraocular Movements: Extraocular movements intact.   Cardiovascular:      Rate and Rhythm: Normal rate and regular rhythm.      Pulses:           Dorsalis pedis pulses are 2+ on the right side and 2+ on the left side.        Posterior tibial pulses are 2+ on the right side and 2+ on the left side.      Heart sounds: Normal heart sounds. No murmur heard.  Pulmonary:      Effort: Pulmonary effort is normal.      Breath sounds: Normal breath sounds.   Abdominal:      General: There is no distension.      Palpations: There is no mass.      Tenderness: There is no abdominal tenderness. There is no guarding.   Musculoskeletal:         General: Normal range of motion.      Right lower leg: No edema.      Left lower leg: No edema.      Right foot: No deformity.      Left foot: No deformity.   Feet:      Right foot:      Protective Sensation: 5 sites tested.  5 sites sensed.      Skin integrity: No ulcer, blister, skin breakdown, erythema or warmth.      Left foot:      Protective Sensation: 5 sites tested.  5 sites sensed.      Skin integrity: No ulcer, blister, skin breakdown, erythema or warmth.   Skin:     General: Skin is warm and dry.   Neurological:      Mental Status: She is alert and oriented to person, place, and time.      Coordination: Coordination normal.   Psychiatric:         Behavior: Behavior normal.         Thought Content: Thought content normal.       EKG sinus domenica         "

## 2024-07-15 ENCOUNTER — LAB VISIT (OUTPATIENT)
Dept: LAB | Facility: HOSPITAL | Age: 64
End: 2024-07-15
Payer: MEDICAID

## 2024-07-15 ENCOUNTER — OFFICE VISIT (OUTPATIENT)
Dept: FAMILY MEDICINE | Facility: CLINIC | Age: 64
End: 2024-07-15
Payer: MEDICAID

## 2024-07-15 VITALS
DIASTOLIC BLOOD PRESSURE: 72 MMHG | SYSTOLIC BLOOD PRESSURE: 122 MMHG | HEART RATE: 67 BPM | OXYGEN SATURATION: 98 % | WEIGHT: 179.38 LBS | BODY MASS INDEX: 28.83 KG/M2 | TEMPERATURE: 98 F | HEIGHT: 66 IN

## 2024-07-15 DIAGNOSIS — E11.9 TYPE 2 DIABETES MELLITUS WITHOUT COMPLICATION, WITHOUT LONG-TERM CURRENT USE OF INSULIN: ICD-10-CM

## 2024-07-15 DIAGNOSIS — E66.3 OVERWEIGHT: ICD-10-CM

## 2024-07-15 DIAGNOSIS — Z01.419 WELL WOMAN EXAM: ICD-10-CM

## 2024-07-15 DIAGNOSIS — K29.70 HELICOBACTER PYLORI GASTRITIS: ICD-10-CM

## 2024-07-15 DIAGNOSIS — J31.0 NONALLERGIC RHINITIS: ICD-10-CM

## 2024-07-15 DIAGNOSIS — K21.9 GASTROESOPHAGEAL REFLUX DISEASE WITHOUT ESOPHAGITIS: ICD-10-CM

## 2024-07-15 DIAGNOSIS — R53.83 FATIGUE, UNSPECIFIED TYPE: Primary | ICD-10-CM

## 2024-07-15 DIAGNOSIS — A04.8 H. PYLORI INFECTION: ICD-10-CM

## 2024-07-15 DIAGNOSIS — I10 ESSENTIAL HYPERTENSION: ICD-10-CM

## 2024-07-15 DIAGNOSIS — R07.89 ATYPICAL CHEST PAIN: ICD-10-CM

## 2024-07-15 DIAGNOSIS — E78.5 DYSLIPIDEMIA: ICD-10-CM

## 2024-07-15 DIAGNOSIS — B96.81 HELICOBACTER PYLORI GASTRITIS: ICD-10-CM

## 2024-07-15 LAB
ESTIMATED AVG GLUCOSE: 123 MG/DL (ref 68–131)
HBA1C MFR BLD: 5.9 % (ref 4–5.6)
OHS QRS DURATION: 86 MS
OHS QTC CALCULATION: 404 MS

## 2024-07-15 PROCEDURE — 93005 ELECTROCARDIOGRAM TRACING: CPT | Mod: PBBFAC,PO | Performed by: INTERNAL MEDICINE

## 2024-07-15 PROCEDURE — 3074F SYST BP LT 130 MM HG: CPT | Mod: CPTII,,, | Performed by: INTERNAL MEDICINE

## 2024-07-15 PROCEDURE — 99999 PR PBB SHADOW E&M-EST. PATIENT-LVL IV: CPT | Mod: PBBFAC,,, | Performed by: INTERNAL MEDICINE

## 2024-07-15 PROCEDURE — 87338 HPYLORI STOOL AG IA: CPT | Performed by: INTERNAL MEDICINE

## 2024-07-15 PROCEDURE — 36415 COLL VENOUS BLD VENIPUNCTURE: CPT | Mod: PO | Performed by: INTERNAL MEDICINE

## 2024-07-15 PROCEDURE — 3061F NEG MICROALBUMINURIA REV: CPT | Mod: CPTII,,, | Performed by: INTERNAL MEDICINE

## 2024-07-15 PROCEDURE — 3078F DIAST BP <80 MM HG: CPT | Mod: CPTII,,, | Performed by: INTERNAL MEDICINE

## 2024-07-15 PROCEDURE — 1160F RVW MEDS BY RX/DR IN RCRD: CPT | Mod: CPTII,,, | Performed by: INTERNAL MEDICINE

## 2024-07-15 PROCEDURE — 4010F ACE/ARB THERAPY RXD/TAKEN: CPT | Mod: CPTII,,, | Performed by: INTERNAL MEDICINE

## 2024-07-15 PROCEDURE — 99214 OFFICE O/P EST MOD 30 MIN: CPT | Mod: S$PBB,,, | Performed by: INTERNAL MEDICINE

## 2024-07-15 PROCEDURE — 83036 HEMOGLOBIN GLYCOSYLATED A1C: CPT | Performed by: INTERNAL MEDICINE

## 2024-07-15 PROCEDURE — 1159F MED LIST DOCD IN RCRD: CPT | Mod: CPTII,,, | Performed by: INTERNAL MEDICINE

## 2024-07-15 PROCEDURE — 99214 OFFICE O/P EST MOD 30 MIN: CPT | Mod: PBBFAC,PO | Performed by: INTERNAL MEDICINE

## 2024-07-15 PROCEDURE — 3066F NEPHROPATHY DOC TX: CPT | Mod: CPTII,,, | Performed by: INTERNAL MEDICINE

## 2024-07-15 PROCEDURE — 3008F BODY MASS INDEX DOCD: CPT | Mod: CPTII,,, | Performed by: INTERNAL MEDICINE

## 2024-07-15 PROCEDURE — 93010 ELECTROCARDIOGRAM REPORT: CPT | Mod: S$PBB,,, | Performed by: INTERNAL MEDICINE

## 2024-07-15 RX ORDER — CETIRIZINE HYDROCHLORIDE 10 MG/1
10 CAPSULE, LIQUID FILLED ORAL DAILY
Qty: 30 CAPSULE | Refills: 11 | Status: SHIPPED | OUTPATIENT
Start: 2024-07-15

## 2024-07-15 RX ORDER — SEMAGLUTIDE 1.34 MG/ML
1 INJECTION, SOLUTION SUBCUTANEOUS
Qty: 4 EACH | Refills: 2 | Status: SHIPPED | OUTPATIENT
Start: 2024-08-15

## 2024-07-15 RX ORDER — FLUTICASONE PROPIONATE 50 MCG
1 SPRAY, SUSPENSION (ML) NASAL DAILY
Qty: 16 G | Refills: 11 | Status: SHIPPED | OUTPATIENT
Start: 2024-07-15

## 2024-07-23 ENCOUNTER — TELEPHONE (OUTPATIENT)
Dept: OPTOMETRY | Facility: CLINIC | Age: 64
End: 2024-07-23
Payer: MEDICAID

## 2024-07-23 PROBLEM — Z86.19 HISTORY OF HELICOBACTER PYLORI INFECTION: Status: ACTIVE | Noted: 2024-03-08

## 2024-07-23 LAB
H PYLORI AG STL QL IA: NOT DETECTED
H PYLORI AG STL QL IA: NOT DETECTED

## 2024-07-23 NOTE — TELEPHONE ENCOUNTER
Tried  to call patient regarding reschedule request for 07/10/2024 pt has a voicemail that does not allow messages. Was unable to leave a message or reach pt to reschedule for Dr. Hidalgo.     -JE

## 2024-07-29 ENCOUNTER — OFFICE VISIT (OUTPATIENT)
Dept: OBSTETRICS AND GYNECOLOGY | Facility: CLINIC | Age: 64
End: 2024-07-29
Payer: MEDICAID

## 2024-07-29 ENCOUNTER — CLINICAL SUPPORT (OUTPATIENT)
Dept: OBSTETRICS AND GYNECOLOGY | Facility: CLINIC | Age: 64
End: 2024-07-29
Payer: MEDICAID

## 2024-07-29 VITALS — WEIGHT: 179.75 LBS | BODY MASS INDEX: 29.01 KG/M2

## 2024-07-29 DIAGNOSIS — Z90.710 HISTORY OF ABDOMINAL HYSTERECTOMY: ICD-10-CM

## 2024-07-29 DIAGNOSIS — Z01.419 WELL WOMAN EXAM: ICD-10-CM

## 2024-07-29 DIAGNOSIS — Z79.890 HORMONE REPLACEMENT THERAPY (HRT): Primary | ICD-10-CM

## 2024-07-29 DIAGNOSIS — N95.1 MENOPAUSAL SYMPTOM: Primary | ICD-10-CM

## 2024-07-29 PROCEDURE — 99459 PELVIC EXAMINATION: CPT | Mod: PBBFAC | Performed by: OBSTETRICS & GYNECOLOGY

## 2024-07-29 PROCEDURE — 96372 THER/PROPH/DIAG INJ SC/IM: CPT | Mod: PBBFAC

## 2024-07-29 PROCEDURE — 1159F MED LIST DOCD IN RCRD: CPT | Mod: CPTII,,, | Performed by: OBSTETRICS & GYNECOLOGY

## 2024-07-29 PROCEDURE — 3044F HG A1C LEVEL LT 7.0%: CPT | Mod: CPTII,,, | Performed by: OBSTETRICS & GYNECOLOGY

## 2024-07-29 PROCEDURE — 99999PBSHW PR PBB SHADOW TECHNICAL ONLY FILED TO HB: Mod: PBBFAC,,,

## 2024-07-29 PROCEDURE — 3066F NEPHROPATHY DOC TX: CPT | Mod: CPTII,,, | Performed by: OBSTETRICS & GYNECOLOGY

## 2024-07-29 PROCEDURE — 3008F BODY MASS INDEX DOCD: CPT | Mod: CPTII,,, | Performed by: OBSTETRICS & GYNECOLOGY

## 2024-07-29 PROCEDURE — 99999 PR PBB SHADOW E&M-EST. PATIENT-LVL III: CPT | Mod: PBBFAC,,, | Performed by: OBSTETRICS & GYNECOLOGY

## 2024-07-29 PROCEDURE — 4010F ACE/ARB THERAPY RXD/TAKEN: CPT | Mod: CPTII,,, | Performed by: OBSTETRICS & GYNECOLOGY

## 2024-07-29 PROCEDURE — 99459 PELVIC EXAMINATION: CPT | Mod: S$PBB,,, | Performed by: OBSTETRICS & GYNECOLOGY

## 2024-07-29 PROCEDURE — 3061F NEG MICROALBUMINURIA REV: CPT | Mod: CPTII,,, | Performed by: OBSTETRICS & GYNECOLOGY

## 2024-07-29 PROCEDURE — 99213 OFFICE O/P EST LOW 20 MIN: CPT | Mod: PBBFAC | Performed by: OBSTETRICS & GYNECOLOGY

## 2024-07-29 PROCEDURE — 99396 PREV VISIT EST AGE 40-64: CPT | Mod: S$PBB,,, | Performed by: OBSTETRICS & GYNECOLOGY

## 2024-07-29 RX ORDER — ESTRADIOL VALERATE 20 MG/ML
20 INJECTION INTRAMUSCULAR
Status: COMPLETED | OUTPATIENT
Start: 2024-07-29 | End: 2024-07-29

## 2024-07-29 RX ADMIN — ESTRADIOL VALERATE 20 MG: 20 INJECTION INTRAMUSCULAR at 11:07

## 2024-08-03 ENCOUNTER — HOSPITAL ENCOUNTER (EMERGENCY)
Facility: HOSPITAL | Age: 64
Discharge: HOME | End: 2024-08-03
Attending: EMERGENCY MEDICINE | Admitting: OPHTHALMOLOGY
Payer: MEDICAID

## 2024-08-03 VITALS
HEIGHT: 66 IN | RESPIRATION RATE: 17 BRPM | WEIGHT: 176 LBS | HEART RATE: 51 BPM | SYSTOLIC BLOOD PRESSURE: 123 MMHG | TEMPERATURE: 98 F | DIASTOLIC BLOOD PRESSURE: 60 MMHG | OXYGEN SATURATION: 98 % | BODY MASS INDEX: 28.28 KG/M2

## 2024-08-03 DIAGNOSIS — H40.052 INCREASED INTRAOCULAR PRESSURE, LEFT: Primary | ICD-10-CM

## 2024-08-03 DIAGNOSIS — H40.212 ACUTE ANGLE-CLOSURE GLAUCOMA OF LEFT EYE: ICD-10-CM

## 2024-08-03 DIAGNOSIS — E11.9 TYPE 2 DIABETES MELLITUS WITHOUT COMPLICATION, WITHOUT LONG-TERM CURRENT USE OF INSULIN: ICD-10-CM

## 2024-08-03 DIAGNOSIS — H54.62 VISION LOSS OF LEFT EYE: ICD-10-CM

## 2024-08-03 LAB — POCT GLUCOSE: 97 MG/DL (ref 70–110)

## 2024-08-03 PROCEDURE — 25000003 PHARM REV CODE 250: Performed by: STUDENT IN AN ORGANIZED HEALTH CARE EDUCATION/TRAINING PROGRAM

## 2024-08-03 PROCEDURE — 96365 THER/PROPH/DIAG IV INF INIT: CPT

## 2024-08-03 PROCEDURE — 63600175 PHARM REV CODE 636 W HCPCS: Performed by: EMERGENCY MEDICINE

## 2024-08-03 PROCEDURE — 99284 EMERGENCY DEPT VISIT MOD MDM: CPT

## 2024-08-03 PROCEDURE — 63600175 PHARM REV CODE 636 W HCPCS: Mod: JZ,JG

## 2024-08-03 PROCEDURE — 25000003 PHARM REV CODE 250: Performed by: EMERGENCY MEDICINE

## 2024-08-03 PROCEDURE — 96375 TX/PRO/DX INJ NEW DRUG ADDON: CPT

## 2024-08-03 PROCEDURE — 63600175 PHARM REV CODE 636 W HCPCS: Performed by: STUDENT IN AN ORGANIZED HEALTH CARE EDUCATION/TRAINING PROGRAM

## 2024-08-03 PROCEDURE — 82962 GLUCOSE BLOOD TEST: CPT

## 2024-08-03 PROCEDURE — 25000003 PHARM REV CODE 250

## 2024-08-03 PROCEDURE — 96376 TX/PRO/DX INJ SAME DRUG ADON: CPT

## 2024-08-03 PROCEDURE — 67028 INJECTION EYE DRUG: CPT | Mod: LT

## 2024-08-03 RX ORDER — TIMOLOL MALEATE 5 MG/ML
1 SOLUTION/ DROPS OPHTHALMIC
Status: COMPLETED | OUTPATIENT
Start: 2024-08-03 | End: 2024-08-03

## 2024-08-03 RX ORDER — PILOCARPINE HYDROCHLORIDE 20 MG/ML
1 SOLUTION/ DROPS OPHTHALMIC
Status: COMPLETED | OUTPATIENT
Start: 2024-08-03 | End: 2024-08-03

## 2024-08-03 RX ORDER — PILOCARPINE HYDROCHLORIDE 20 MG/ML
1 SOLUTION/ DROPS OPHTHALMIC
Status: DISCONTINUED | OUTPATIENT
Start: 2024-08-03 | End: 2024-08-03

## 2024-08-03 RX ORDER — ONDANSETRON HYDROCHLORIDE 2 MG/ML
4 INJECTION, SOLUTION INTRAVENOUS
Status: COMPLETED | OUTPATIENT
Start: 2024-08-03 | End: 2024-08-03

## 2024-08-03 RX ORDER — HYDROMORPHONE HYDROCHLORIDE 1 MG/ML
1 INJECTION, SOLUTION INTRAMUSCULAR; INTRAVENOUS; SUBCUTANEOUS
Status: COMPLETED | OUTPATIENT
Start: 2024-08-03 | End: 2024-08-03

## 2024-08-03 RX ORDER — BRIMONIDINE TARTRATE 1.5 MG/ML
1 SOLUTION/ DROPS OPHTHALMIC
Status: COMPLETED | OUTPATIENT
Start: 2024-08-03 | End: 2024-08-03

## 2024-08-03 RX ORDER — ACETAZOLAMIDE 500 MG/5ML
500 INJECTION, POWDER, LYOPHILIZED, FOR SOLUTION INTRAVENOUS ONCE
Status: COMPLETED | OUTPATIENT
Start: 2024-08-03 | End: 2024-08-03

## 2024-08-03 RX ORDER — ACETAZOLAMIDE 500 MG/1
500 CAPSULE, EXTENDED RELEASE ORAL 2 TIMES DAILY
Qty: 60 CAPSULE | Refills: 0 | Status: SHIPPED | OUTPATIENT
Start: 2024-08-03 | End: 2024-09-02

## 2024-08-03 RX ORDER — HYDROMORPHONE HYDROCHLORIDE 1 MG/ML
0.5 INJECTION, SOLUTION INTRAMUSCULAR; INTRAVENOUS; SUBCUTANEOUS
Status: COMPLETED | OUTPATIENT
Start: 2024-08-03 | End: 2024-08-03

## 2024-08-03 RX ORDER — PROPARACAINE HYDROCHLORIDE 5 MG/ML
1 SOLUTION/ DROPS OPHTHALMIC
Status: COMPLETED | OUTPATIENT
Start: 2024-08-03 | End: 2024-08-03

## 2024-08-03 RX ORDER — ACETAZOLAMIDE 500 MG/1
500 CAPSULE, EXTENDED RELEASE ORAL 2 TIMES DAILY
Qty: 60 CAPSULE | Refills: 0 | Status: SHIPPED | OUTPATIENT
Start: 2024-08-03 | End: 2024-08-03

## 2024-08-03 RX ADMIN — ACETAZOLAMIDE 500 MG: 500 INJECTION, POWDER, LYOPHILIZED, FOR SOLUTION INTRAVENOUS at 07:08

## 2024-08-03 RX ADMIN — ONDANSETRON 4 MG: 2 INJECTION INTRAMUSCULAR; INTRAVENOUS at 01:08

## 2024-08-03 RX ADMIN — HYDROMORPHONE HYDROCHLORIDE 0.5 MG: 1 INJECTION, SOLUTION INTRAMUSCULAR; INTRAVENOUS; SUBCUTANEOUS at 01:08

## 2024-08-03 RX ADMIN — DEXTROSE 500 MG: 50 INJECTION, SOLUTION INTRAVENOUS at 02:08

## 2024-08-03 RX ADMIN — HYDROMORPHONE HYDROCHLORIDE 1 MG: 1 INJECTION, SOLUTION INTRAMUSCULAR; INTRAVENOUS; SUBCUTANEOUS at 05:08

## 2024-08-03 RX ADMIN — FLUORESCEIN SODIUM 1 EACH: 1 STRIP OPHTHALMIC at 12:08

## 2024-08-03 RX ADMIN — BRIMONIDINE TARTRATE 1 DROP: 1.5 SOLUTION/ DROPS OPHTHALMIC at 10:08

## 2024-08-03 RX ADMIN — Medication 1.25 MG: at 07:08

## 2024-08-03 RX ADMIN — PILOCARPINE HYDROCHLORIDE 1 DROP: 20 SOLUTION/ DROPS OPHTHALMIC at 01:08

## 2024-08-03 RX ADMIN — TIMOLOL MALEATE 1 DROP: 5 SOLUTION OPHTHALMIC at 05:08

## 2024-08-03 RX ADMIN — PROPARACAINE HYDROCHLORIDE 1 DROP: 5 SOLUTION/ DROPS OPHTHALMIC at 12:08

## 2024-08-03 RX ADMIN — TIMOLOL MALEATE 1 DROP: 5 SOLUTION OPHTHALMIC at 02:08

## 2024-08-03 NOTE — ED PROVIDER NOTES
Encounter Date: 8/3/2024    SCRIBE #1 NOTE: I, Su Hu, am scribing for, and in the presence of,  Nakul Sheth PA-C. I have scribed the following portions of the note - Other sections scribed: HPI, ROS, PE.       History     Chief Complaint   Patient presents with    Eye Pain     Pt presents to ER with complaints of left eye pain that started on yesterday. Pt states she has a laser procedure in May and an injection in her eye in . Pt denies using any meds in her eye. Pt admits to having a really bad HA and sensitivity to light and noise that all started yesterday. Pt denies any other issues at this time. Pt blood sugar 97     Patient is a 64 y.o. female with a past medical history of hypertension, diabetes, high cholesterol, diabetic retinopathy, central vein occlusion in left eye with macular degeneration who presents to the Emergency Department for evaluation of left eye pain x 1 day.  She also reports blurry vision in the left eye, photophobia, headache.  Patient states she had a laser procedure done in May and an injection on , meant to follow up in July but missed appointment; receives this due to clogged vessel in her eye per patient.  She reports good control of her diabetes.   She has not taken any medications for the symptoms.   She denies eye drainage, nausea, vomiting, unilateral weakness.          The history is provided by the patient. No  was used.     Review of patient's allergies indicates:   Allergen Reactions    Oxycodone-acetaminophen Hives and Itching     Past Medical History:   Diagnosis Date    Cataract     Diabetes mellitus     does not take any meds for same    High cholesterol     Hypertension      Past Surgical History:   Procedure Laterality Date    CATARACT EXTRACTION W/  INTRAOCULAR LENS IMPLANT Right 2021    Dr. Ferreira     SECTION      CHOLECYSTECTOMY      COLONOSCOPY N/A 2023    Procedure: COLONOSCOPY;  Surgeon: Won  Ramón HERNANDEZ MD;  Location: [facility] ENDOSCOPY;  Service: Endoscopy;  Laterality: N/A;  WLMeds / Referral: PEG AMBRIZ / PEG /  Instructions portal - LW  12/12- instructions to email [email or web address]), pre call complete.  DBM    ESOPHAGOGASTRODUODENOSCOPY N/A 12/19/2023    Procedure: EGD (ESOPHAGOGASTRODUODENOSCOPY);  Surgeon: Ramón Upton MD;  Location: [facility] ENDOSCOPY;  Service: Endoscopy;  Laterality: N/A;    EXTRACTION OF CATARACT Left 10/14/2021    Procedure: EXTRACTION, CATARACT;  Surgeon: Mina Ferreira MD;  Location: [facility] OR;  Service: Ophthalmology;  Laterality: Left;  RN Phone Pre Op. 10-6-21. Covid NEGATIVE 10-13-21. Arrival 06:00 am.  CA    EYE SURGERY Right     catartact    HYSTERECTOMY      Age 53, Fibroids    INTRAOCULAR PROSTHESES INSERTION Right 9/23/2021    Procedure: INSERTION, IOL PROSTHESIS;  Surgeon: Mina Ferreira MD;  Location: [facility] OR;  Service: Ophthalmology;  Laterality: Right;    INTRAOCULAR PROSTHESES INSERTION Left 10/14/2021    Procedure: INSERTION, IOL PROSTHESIS;  Surgeon: Mina Ferreira MD;  Location: [facility] OR;  Service: Ophthalmology;  Laterality: Left;    PHACOEMULSIFICATION OF CATARACT Right 9/23/2021    Procedure: PHACOEMULSIFICATION, CATARACT;  Surgeon: Mina Ferreira MD;  Location: [facility] OR;  Service: Ophthalmology;  Laterality: Right;  RN Phone Pre Op 9-20-21. Covid NEGATIVE ON  9-22-21..  Arrival 05:30 am.  C A     Family History   Problem Relation Name Age of Onset    Coronary artery disease Mother      Diabetes Mother      Hypertension Mother      Cataracts Mother      Stroke Father      Hypertension Father      Prostate cancer Father      Cataracts Father      Lung cancer Brother      Stomach cancer Brother      Glaucoma Daughter      Glaucoma Daughter      Amblyopia Neg Hx      Blindness Neg Hx      Macular degeneration Neg Hx      Retinal detachment Neg Hx      Strabismus Neg Hx       Social History     Tobacco Use    Smoking status:  Never    Smokeless tobacco: Never   Substance Use Topics    Alcohol use: Yes     Comment: socially    Drug use: Never     Review of Systems   Constitutional:  Negative for chills and fever.   Eyes:  Positive for photophobia, pain (left) and visual disturbance. Negative for discharge and itching.   Respiratory:  Negative for cough and shortness of breath.    Cardiovascular:  Negative for chest pain.   Gastrointestinal:  Negative for abdominal pain, nausea and vomiting.   Genitourinary:  Negative for difficulty urinating.   Musculoskeletal:  Negative for back pain, neck pain and neck stiffness.   Neurological:  Positive for headaches. Negative for dizziness, weakness, light-headedness and numbness.       Physical Exam     Initial Vitals [08/03/24 1204]   BP Pulse Resp Temp SpO2   133/67 63 20 98.4 °F (36.9 °C) 97 %      MAP       --         Physical Exam    Nursing note and vitals reviewed.  Constitutional: She appears well-developed and well-nourished. She appears distressed.   HENT:   Head: Normocephalic and atraumatic.   Right Ear: External ear normal.   Left Ear: External ear normal.   Eyes:   Patient does appear uncomfortable.  She is sitting in the dark with sunglasses on.  The left eye is without conjunctival injection.  The left pupil is sluggish when compared to contralateral which is reactive to light and accommodation.  There is no corneal abrasion or ulceration on Wood's lamp exam.  Negative [place]ico sign.  No dendritic lesions.  Trent-Pen measuring pressure greater than 56 mmHg.   Neck: Carotid bruit is not present.   Normal range of motion.  Cardiovascular:  Normal rate, regular rhythm, normal heart sounds and intact distal pulses.     Exam reveals no gallop and no friction rub.       No murmur heard.  Pulmonary/Chest: Breath sounds normal. No respiratory distress. She has no wheezes. She has no rhonchi. She has no rales.   Abdominal: Abdomen is soft. Bowel sounds are normal. She exhibits no distension.  There is no abdominal tenderness. There is no rebound and no guarding.   Musculoskeletal:         General: Normal range of motion.      Cervical back: Normal range of motion.     Neurological: She is alert and oriented to person, place, and time. GCS score is 15. GCS eye subscore is 4. GCS verbal subscore is 5. GCS motor subscore is 6.   Psychiatric: She has a normal mood and affect.         ED Course   Procedures  Labs Reviewed   POCT GLUCOSE       Result Value    POCT Glucose 97            Imaging Results    None          Medications   proparacaine 0.5 % ophthalmic solution 1 drop (1 drop Both Eyes Given 8/3/24 1230)   fluorescein ophthalmic strip 1 each (1 each Left Eye Given 8/3/24 1230)   timolol maleate 0.5% ophthalmic solution 1 drop (1 drop Left Eye Given 8/3/24 1409)   acetaZOLAMIDE (DIAMOX) 500 mg in D5W 50 mL (0 mg Intravenous Stopped 8/3/24 1509)   HYDROmorphone injection 0.5 mg (0.5 mg Intravenous Given 8/3/24 1357)   ondansetron injection 4 mg (4 mg Intravenous Given 8/3/24 1354)   pilocarpine HCL 2% ophthalmic solution 1 drop (1 drop Left Eye Given 8/3/24 1356)     Medical Decision Making  This is an emergent evaluation of a 64 y.o. female with a past medical history of hypertension, diabetes, high cholesterol, diabetic retinopathy, central vein occlusion in left eye with macular degeneration who presents to the Emergency Department for evaluation of left eye pain x 1 day.  She also reports blurry vision in the left eye, photophobia, headache.    Patient does appear uncomfortable.  She is sitting in the dark with sunglasses on.  The left eye is without conjunctival injection.  The left pupil is sluggish when compared to contralateral which is reactive to light and accommodation.  There is no corneal abrasion or ulceration on Wood's lamp exam.  Negative [place] sign.  No dendritic lesions.  Trent-Pen measuring pressure greater than 56 mmHg. Regular rate rhythm without murmurs.  No carotid bruits  appreciated on exam. Lungs are clear to auscultation bilaterally.  Abdomen is soft, nontender, non distended, with normal bowel sounds.     Differential diagnosis includes but is not limited to glaucoma, central vein occlusion, central artery occlusion, diabetic retinopathy, bacterial conjunctivitis, viral conjunctivitis, allergic conjunctivitis.    Vital signs, chart, labs, and/or imaging were all reviewed.  See ED course below and interpretations above. My overall impression is elevated intra-ocular pressure. Dr. Chang spoke with ED Physician at [facility].  Patient accepted for transfer for ophthalmology consult further intervention.  Medications given here in the ED by my attending physician to lower intra-ocular pressure prior to transfer which was recommended by [facility].  Patient agreeable to plan.  Patient is stable on my last encounter.    Nakul Sheth PA-C    DISCLAIMER: This note was prepared with [employer] voice recognition transcription software. Garbled syntax, mangled pronouns, and other bizarre constructions may be attributed to that software system.       Amount and/or Complexity of Data Reviewed  Labs: ordered. Decision-making details documented in ED Course.    Risk  Prescription drug management.            Scribe Attestation:   Scribe #1: I performed the above scribed service and the documentation accurately describes the services I performed. I attest to the accuracy of the note.        ED Course as of 08/03/24 1531   Sat Aug 03, 2024   1229 POCT glucose  97 [TM]   1300 Intra-ocular pressure of left eye popping up as error as pressure too high to read.  Suspect higher than 56. I spoke with Dr. Chang about this patient. He is at bedside evaluating. [TM]   1324 Acetazolamide, Zofran, pilocarpine, timolol, Dilaudid ordered by Dr. Chang as well as transfer order. [TM]   1335 Dr. Chang spoke with ED physician at [facility]. Patient was accepted for transfer ED to  ED for ophthalmology consult. Patient informed and in agreement of plan. [TM]      ED Course User Index  [TM] Nakul Sheth PA-C                  I, Nakul Sheth PA-C, personally performed the services described in this documentation. All medical record entries made by the scribe were at my direction and in my presence. I have reviewed the chart and agree that the record reflects my personal performance and is accurate and complete.      DISCLAIMER: This note was prepared with [employer] voice recognition transcription software. Garbled syntax, mangled pronouns, and other bizarre constructions may be attributed to that software system.           Clinical Impression:  Final diagnoses:  [H40.052] Increased intraocular pressure, left (Primary)  [H54.62] Vision loss of left eye          ED Disposition Condition    Transfer to Another Facility Stable                Nakul Sheth PA-C  08/03/24 [id number]

## 2024-08-03 NOTE — ED TRIAGE NOTES
Torsten Trevizo, a 64 y.o. female presents to the ED w/ complaint of Transfer from [facility]. Increased pressure and pain in left eye.    Triage note:  Chief Complaint   Patient presents with    Eye Pain     Pt presents to ER with complaints of left eye pain that started on yesterday. Pt states she has a laser procedure in May and an injection in her eye in June. Pt denies using any meds in her eye. Pt admits to having a really bad HA and sensitivity to light and noise that all started yesterday. Pt denies any other issues at this time. Pt blood sugar 97    Eye Problem     Transfer from . Left eye pain and pressure.      Review of patient's allergies indicates:   Allergen Reactions    Oxycodone-acetaminophen Hives and Itching     Past Medical History:   Diagnosis Date    Cataract     Diabetes mellitus     does not take any meds for same    High cholesterol     Hypertension

## 2024-08-03 NOTE — ED NOTES
----- Message from Kya Coleman sent at 11/14/2018  4:43 PM CST -----  Contact: Pt.   Pt is calling regarding requesting script refill on atorvastatin (LIPITOR) 80 MG tablet. ..[phone] (home) [phone] (work)      .  [facility] - [address]  [address] [place] [address]  Phone: [phone] Fax: [phone]    [id number] - [place] - [address] AT [facility]  [address]  [place] [address]  Phone: [phone] Fax: [phone]       EMS on unit to transfer pt to [facility]. All questions and concern addressed. Pt. Left on stretcher and in stable condition.

## 2024-08-03 NOTE — ED PROVIDER NOTES
Encounter Date: 8/3/2024       History     Chief Complaint   Patient presents with    Eye Pain     Pt presents to ER with complaints of left eye pain that started on yesterday. Pt states she has a laser procedure in May and an injection in her eye in . Pt denies using any meds in her eye. Pt admits to having a really bad HA and sensitivity to light and noise that all started yesterday. Pt denies any other issues at this time. Pt blood sugar 97    Eye Problem     Transfer from . Left eye pain and pressure.      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    On presentation, patient endorses continued photosensitivity and severe pain in her left eye.  She endorses blurry vision but denies pain with moving the eye.  She endorses associated 7/10 headache as well.  Her nausea from previous has since resolved with the Zofran she received from outside emergency department.  She denies chest pain, shortness of breath, fever, chills, cough.  She endorses previous encounter for acute angle closure glaucoma requiring injections and maintenance therapies.      On chart review at previous emergency department, IOP OS 56, received zofran, pilocarpine, dilaudid, timolol, and acetazolamide for concerns of acute angle closure glaucoma, she was transferred for further evaluation by Ophthalmology.    Review of patient's allergies indicates:   Allergen Reactions    Oxycodone-acetaminophen Hives and Itching     Past Medical History:   Diagnosis Date    Cataract     Diabetes mellitus     does not take any meds for same    High cholesterol     Hypertension      Past Surgical History:   Procedure Laterality Date    CATARACT EXTRACTION W/  INTRAOCULAR LENS IMPLANT Right 2021    Dr. Ferreira     SECTION      CHOLECYSTECTOMY      COLONOSCOPY  N/A 12/19/2023    Procedure: COLONOSCOPY;  Surgeon: Ramnó Upton MD;  [facility];  Service: Endoscopy;  Laterality: N/A;  WLMeds / Referral: PEG AMBRIZ / COLTEN /  Instructions portal -   12/12- instructions to email ([email or web address]), pre call complete.  DBM    ESOPHAGOGASTRODUODENOSCOPY N/A 12/19/2023    Procedure: EGD (ESOPHAGOGASTRODUODENOSCOPY);  Surgeon: Ramón Upton MD;  [facility];  Service: Endoscopy;  Laterality: N/A;    EXTRACTION OF CATARACT Left 10/14/2021    Procedure: EXTRACTION, CATARACT;  Surgeon: Mina Ferreira MD;  Location: [facility] OR;  Service: Ophthalmology;  Laterality: Left;  RN Phone Pre Op. 10-6-21. Covid NEGATIVE 10-13-21. Arrival 06:00 am.  CA    EYE SURGERY Right     catartact    HYSTERECTOMY      Age 53, Fibroids    INTRAOCULAR PROSTHESES INSERTION Right 9/23/2021    Procedure: INSERTION, IOL PROSTHESIS;  Surgeon: Mina Ferreira MD;  Location: [facility] OR;  Service: Ophthalmology;  Laterality: Right;    INTRAOCULAR PROSTHESES INSERTION Left 10/14/2021    Procedure: INSERTION, IOL PROSTHESIS;  Surgeon: Mina Ferreira MD;  Location: [facility] OR;  Service: Ophthalmology;  Laterality: Left;    PHACOEMULSIFICATION OF CATARACT Right 9/23/2021    Procedure: PHACOEMULSIFICATION, CATARACT;  Surgeon: Mina Ferreira MD;  Location: [facility] OR;  Service: Ophthalmology;  Laterality: Right;  RN Phone Pre Op 9-20-21. Covid NEGATIVE ON  9-22-21..  Arrival 05:30 am.  C A     Family History   Problem Relation Name Age of Onset    Coronary artery disease Mother      Diabetes Mother      Hypertension Mother      Cataracts Mother      Stroke Father      Hypertension Father      Prostate cancer Father      Cataracts Father      Lung cancer Brother      Stomach cancer Brother      Glaucoma Daughter      Glaucoma Daughter      Amblyopia Neg Hx      Blindness Neg Hx      Macular degeneration Neg Hx      Retinal detachment Neg Hx      Strabismus Neg  Hx       Social History     Tobacco Use    Smoking status: Never    Smokeless tobacco: Never   Substance Use Topics    Alcohol use: Yes     Comment: socially    Drug use: Never     Review of Systems  See HPI for pertinent ROS.   Physical Exam     Initial Vitals [08/03/24 1204]   BP Pulse Resp Temp SpO2   133/67 63 20 98.4 °F (36.9 °C) 97 %      MAP       --         Physical Exam    Constitutional: She appears well-developed and well-nourished.   Patient appears to be uncomfortable but not acutely ill   HENT:   Head: Normocephalic and atraumatic.   Eyes: Conjunctivae are normal. No scleral icterus.   Left eye with chemosis and conjunctival injection, extraocular motions are intact.  Left pupil 3 mm and nonreactive to light, right pupil 3 mm but reactive to light with accommodation.   Neck: No JVD present.   Cardiovascular:  Normal rate, regular rhythm and normal heart sounds.     Exam reveals no gallop and no friction rub.       No murmur heard.  Pulmonary/Chest: Breath sounds normal. No stridor. She has no wheezes. She has no rhonchi. She has no rales.   Abdominal: Abdomen is soft. There is no abdominal tenderness. There is no rebound and no guarding.   Musculoskeletal:         General: No tenderness or edema.     Neurological: She is alert.   Skin: Skin is warm. Capillary refill takes less than 2 seconds.   Psychiatric: She has a normal mood and affect.         ED Course   Procedures  Labs Reviewed   POCT GLUCOSE       Result Value    POCT Glucose 97            Imaging Results    None          Medications   proparacaine 0.5 % ophthalmic solution 1 drop (1 drop Both Eyes Given 8/3/24 1230)   fluorescein ophthalmic strip 1 each (1 each Left Eye Given 8/3/24 1230)   timolol maleate 0.5% ophthalmic solution 1 drop (1 drop Left Eye Given 8/3/24 1409)   acetaZOLAMIDE (DIAMOX) 500 mg in D5W 50 mL (0 mg Intravenous Stopped 8/3/24 1509)   HYDROmorphone injection 0.5 mg (0.5 mg Intravenous Given 8/3/24 1357)   ondansetron  injection 4 mg (4 mg Intravenous Given 8/3/24 1354)   pilocarpine HCL 2% ophthalmic solution 1 drop (1 drop Left Eye Given 8/3/24 1356)   HYDROmorphone injection 1 mg (1 mg Intravenous Given 8/3/24 1750)   timolol maleate 0.5% ophthalmic solution 1 drop (1 drop Left Eye Given 8/3/24 1758)   acetaZOLAMIDE injection 500 mg (500 mg Intravenous Given 8/3/24 1934)   bevacizumab (Avastin) 25 mg/mL ophthalmic injection syringe 1.25 mg (1.25 mg Intravitreal Given by Provider 8/3/24 1930)   brimonidine 0.15 % OPTH DROP ophthalmic solution 1 drop (1 drop Left Eye Given 8/3/24 2209)     Medical Decision Making  Amount and/or Complexity of Data Reviewed  Labs:  Decision-making details documented in ED Course.    Risk  Prescription drug management.               ED Course as of 08/04/24 1140   Sat Aug 03, 2024   1229 POCT glucose  97 [TM]   1300 Intra-ocular pressure of left eye popping up as error as pressure too high to read.  Suspect higher than 56. I spoke with Dr. Chang about this patient. He is at bedside evaluating. [TM]   1324 Acetazolamide, Zofran, pilocarpine, timolol, Dilaudid ordered by Dr. Chang as well as transfer order. [TM]   1335 Dr. Chang spoke with ED physician at [facility]. Patient was accepted for transfer ED to ED for ophthalmology consult. Patient informed and in agreement of plan. [TM]      ED Course User Index  [TM] Nakul Sheth, ONDINA                       64-year-old female with previous history of acute glaucoma and central retinal vein occlusion presenting for transfer to be evaluated by Ophthalmology for concerns of repeat acute angle closure glaucoma.  On presentation, vital signs are stable, she was afebrile and saturating well on room air.  Physical exam with conjunctival injection and sluggish/nonreactive left pupil.  See ophthalmology note for further evaluation and physical exam of the eye.  Ophthalmology confirmed concerns for acute angle closure glaucoma,  ophthalmology completed Avastin intra-ocular injection at bedside.  She was discharged with Diamox 500 mg b.i.d. p.o., and Vigamox t.i.d. for 3-4 days dosed to the left eye, and started on Cosopt b.i.d. and brimonidine t.i.d. for the left eye as well.  She was discharged with all her ophthalmic medications to bedside and Diamox sent to [facility].  She was discharged in stable condition with improvement of her ocular pressures.  She was given strict return to ER precautions which she verbalized understanding of.    Clinical Impression:  Final diagnoses:  [H40.052] Increased intraocular pressure, left (Primary)  [H54.62] Vision loss of left eye  [H40.212] Acute angle-closure glaucoma of left eye          ED Disposition Condition    Discharge Stable          ED Prescriptions       Medication Sig Dispense Start Date End Date Auth. Provider    acetaZOLAMIDE (DIAMOX) 500 mg CpSR  (Status: Discontinued) Take 1 capsule (500 mg total) by mouth 2 (two) times daily. 60 capsule 8/3/2024 8/3/2024 Demetria Crain MD    acetaZOLAMIDE (DIAMOX) 500 mg CpSR Take 1 capsule (500 mg total) by mouth 2 (two) times daily. 60 capsule 8/3/2024 9/2/2024 Demetria Crain MD          Follow-up Information       Follow up With Specialties Details Why Contact Info Additional Information    [facility] - [address] [place] [address] Ophthalmology Go to   [address] [facility] [address] [phone] Please arrive on the 10th floor for check-in.    [facility] - Emergency Dept Emergency Medicine Go to  If symptoms worsen [address] [facility] [id number] [phone]              Demetria Crain MD  Resident  08/04/24 3316

## 2024-08-04 RX ORDER — SEMAGLUTIDE 1.34 MG/ML
1 INJECTION, SOLUTION SUBCUTANEOUS
Qty: 4 EACH | Refills: 2 | Status: SHIPPED | OUTPATIENT
Start: 2024-08-15

## 2024-08-04 NOTE — DISCHARGE INSTRUCTIONS
Take the vigamox 3 times daily for 4 days in the L eye  Take Cosopt twice a day in the L eye  Take Brimonidine three times daily in the L eye   Take the diamox twice a day by mouth    Follow up in the retina clinic and return to the ER if your pain returns in your eye.

## 2024-08-04 NOTE — CONSULTS
"Consultation Report  Ophthalmology Service    Date: 2024    Reason for Consult: "AACG"     History of Present Illness: Torsten Trevizo is a 64 y.o. female with POHx of CRVO OS c/b NVG who presented as a transfer to [facility] for AACG. Ophthalmology is being consulted to evaluate for acute angle closure glaucoma.    Patient reports pain in left eye that began around 11 PM. Patient presented to OSH, tonopen unable to measure IOP as too high. Patient given 500 diamox and transferred to [facility]. Patient reports vision in left eye is shadows at baseline.      POcularHx: See above for history of ocular problems. Patient denies wearing contact lenses. Patient wears glasses. Patient denies previous trauma to the eye. Patient denies history of ocular surgeries.    Current eye gtts: Denies     Family Hx: Denies family history of glaucoma, macular degeneration, or blindness. family history includes Cataracts in her father and mother; Coronary artery disease in her mother; Diabetes in her mother; Glaucoma in her daughter and daughter; Hypertension in her father and mother; Lung cancer in her brother; Prostate cancer in her father; Stomach cancer in her brother; Stroke in her father.     PMHx:  has a past medical history of Cataract, Diabetes mellitus, High cholesterol, and Hypertension.     PSurgHx:  has a past surgical history that includes  section; Hysterectomy; Cholecystectomy; Phacoemulsification of cataract (Right, 2021); Intraocular prosthesis insertion (Right, 2021); Cataract extraction w/  intraocular lens implant (Right, 2021); Eye surgery (Right); Extraction of cataract (Left, 10/14/2021); Intraocular prosthesis insertion (Left, 10/14/2021); Esophagogastroduodenoscopy (N/A, 2023); and Colonoscopy (N/A, 2023).     Home Medications:   Prior to Admission medications    Medication Sig Start Date End Date Taking? Authorizing Provider   atorvastatin (LIPITOR) 40 MG tablet Take 1 tablet (40 " mg total) by mouth once daily. 6/2/24   Diaz Yip MD   blood-glucose meter kit To check BG 1 times daily, to use with insurance preferred meter 3/13/20   Diaz Yip MD   cetirizine (ZYRTEC) 10 mg Cap Take 10 mg by mouth Daily. 7/15/24   Diaz Yip MD   ciclopirox (PENLAC) 8 % Soln Apply topically nightly. 6/5/23   Sweta Castillo DPM   fluticasone propionate (FLONASE) 50 mcg/actuation nasal spray 1 spray (50 mcg total) by Each Nostril route once daily. 7/15/24   Diaz Yip MD   lisinopriL (PRINIVIL,ZESTRIL) 20 MG tablet Take 1 tablet (20 mg total) by mouth once daily. 6/2/24   Diaz Yip MD   meclizine (ANTIVERT) 25 mg tablet Take 1 tablet (25 mg total) by mouth 3 (three) times daily as needed for Dizziness. 12/8/23   Diaz Yip MD   ondansetron (ZOFRAN-ODT) 8 MG TbDL Take 1 tablet (8 mg total) by mouth every 8 (eight) hours as needed. 12/8/23   Diaz Yip MD   semaglutide (OZEMPIC) 0.25 mg or 0.5 mg (2 mg/3 mL) pen injector Inject 0.5 mg into the skin every 7 days. 7/15/24 8/14/24  Diaz Yip MD   semaglutide (OZEMPIC) 1 mg/dose (2 mg/1.5 mL) PnIj Inject 1 mg into the skin every 7 days. Month 2 onward 8/15/24   Diaz Yip MD        Medications this encounter:     Allergies: is allergic to oxycodone-acetaminophen.     Social:  reports that she has never smoked. She has never used smokeless tobacco. She reports current alcohol use. She reports that she does not use drugs.     ROS: As per HPI    Ocular examination/Dilated fundus examination:  Base Eye Exam       Visual Acuity (Snellen - Linear)         Right Left    Dist sc  LP    Dist cc 20/20       Correction: Glasses              Tonometry (Tonopen, 5:40 PM)         Right Left    Pressure 15 43              Tonometry #2 (Tonopen, 7:40 PM)         Right Left    Pressure  21              Tonometry #3 (Tonopen, 8:40 PM)         Right Left    Pressure  8              Gonioscopy         Right Left    Temporal   synechiae, NVI    Nasal  synechiae, NVI    Superior  synechiae, NVI    Inferior  synechiae, NVI              Pupils         Dark Light Shape React APD    Right 4 2 Round Brisk     Left 4 4 Round Brisk + reverse              Extraocular Movement         Right Left     Full, Ortho Full, Ortho              Neuro/Psych       Oriented x3: Yes    Mood/Affect: Normal                  Slit Lamp and Fundus Exam       External Exam         Right Left    External Normal Normal              Slit Lamp Exam         Right Left    Lids/Lashes Dermatochalasis - upper lid Dermatochalasis - upper lid    Conjunctiva/Sclera Pigmentation, Pinguecula Pigmentation, Pinguecula    Cornea Clear Clear    Anterior Chamber Deep and quiet Deep and quiet    Iris Round and reactive Round and sluggish, 360 NVI    Lens PCIOL Posterior chamber intraocular lens    Anterior Vitreous Normal Normal              Fundus Exam         Right Left    Disc Pink, sharp no view    C/D Ratio 0.2     Macula Flat, no holes no view    Vessels Normal caliber no view    Periphery No holes, tears, detachments no view                      Assessment/Plan:     # NVG, OS  # Hx of CRVO, OS  - Patient noted to have CRVO on 11/2023  - Follows with [facility] for IVAs for NVI. Last TANNER 6/2024, due for repeat TANNER 7/2024 but missed appointment and never rescheduled.  - s/p PRP 5/14/2024, 6/11/2024  - VA HM on presentation, CF at baseline  - Initial IOP: per OSH report error on tonopen (>59), 36 at [facility] post diamox  - Gonioscopy: closed 360  - No KP, posterior synechiae, or glaucomflecken  - Patient received multiple rounds of cosopt, brimonidine and 1 g diamox  - Proceeded with IV Avastin and AC tap OS. R/B/A explained to patient. Consent signed and witnessed. IOP 8 after tap.    Recommendations  -Start cosopt BID and brimonidine TID in left eye  -Start vigamox TID for 3-4 days in left eye  -Start diamox  BID  Please space out drops by at least 5 minutes    Will schedule  follow up with patient in retina clinic. Message sent to schedulers.    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 The patient voices understanding, and all questions have been answered.  The patient agrees to proceed as planned. Signed consent was obtained, the site was marked, and a timeout was performed. Topical proparacaine 0.5% was used for anesthesia. A lid speculum was placed. Topical Povidone-Iodine 5% was used for antisepsis. The injection was then placed 3.5-4 mm posterior to the inferotemporal limbus. Anterior chamber tap performed with a 30 gauge needle on a TB syringe. IOP confirmed to be 8 with tonopen. The eye was irrigated with BSS. Patient tolerated well with no complications. Patient told to return immediately for vision loss or significant pain develops. The patient was educated that mild irritation tonight and tomorrow was normal secondary to topical Povidone-Iodine use. We discussed warning signs of endophthalmitis, and the patient was further educated that if significant pain or vision loss occurred, they should return immediately to our clinic or the ED.        Common Ophthalmologic Abbreviations  OD: right eye  OS: left eye  OU: both eyes  IOP: intraocular pressure  VA: visual acuity  PH: pinhole  HM: hand motion  LP: light perception  NLP: no light perception  NSC: nuclear sclerotic cataract  DFE: dilated fundus examination  SLE: slit lamp examination  RD: retinal detachment   AT: artificial tears  PFAT: preservative free artificial tears

## 2024-08-06 ENCOUNTER — CLINICAL SUPPORT (OUTPATIENT)
Dept: OPHTHALMOLOGY | Facility: CLINIC | Age: 64
End: 2024-08-06
Payer: MEDICAID

## 2024-08-06 ENCOUNTER — OFFICE VISIT (OUTPATIENT)
Dept: OPHTHALMOLOGY | Facility: CLINIC | Age: 64
End: 2024-08-06
Payer: MEDICAID

## 2024-08-06 DIAGNOSIS — H34.8120 CENTRAL RETINAL VEIN OCCLUSION WITH MACULAR EDEMA OF LEFT EYE: Primary | ICD-10-CM

## 2024-08-06 PROCEDURE — 92201 OPSCPY EXTND RTA DRAW UNI/BI: CPT | Mod: S$PBB,,, | Performed by: OPHTHALMOLOGY

## 2024-08-06 PROCEDURE — 92014 COMPRE OPH EXAM EST PT 1/>: CPT | Mod: S$PBB,,, | Performed by: OPHTHALMOLOGY

## 2024-08-06 PROCEDURE — 92201 OPSCPY EXTND RTA DRAW UNI/BI: CPT | Mod: PBBFAC | Performed by: OPHTHALMOLOGY

## 2024-08-06 PROCEDURE — 3061F NEG MICROALBUMINURIA REV: CPT | Mod: CPTII,,, | Performed by: OPHTHALMOLOGY

## 2024-08-06 PROCEDURE — 99999 PR PBB SHADOW E&M-EST. PATIENT-LVL III: CPT | Mod: PBBFAC,,, | Performed by: OPHTHALMOLOGY

## 2024-08-06 PROCEDURE — 1159F MED LIST DOCD IN RCRD: CPT | Mod: CPTII,,, | Performed by: OPHTHALMOLOGY

## 2024-08-06 PROCEDURE — 4010F ACE/ARB THERAPY RXD/TAKEN: CPT | Mod: CPTII,,, | Performed by: OPHTHALMOLOGY

## 2024-08-06 PROCEDURE — 1160F RVW MEDS BY RX/DR IN RCRD: CPT | Mod: CPTII,,, | Performed by: OPHTHALMOLOGY

## 2024-08-06 PROCEDURE — 3044F HG A1C LEVEL LT 7.0%: CPT | Mod: CPTII,,, | Performed by: OPHTHALMOLOGY

## 2024-08-06 PROCEDURE — 99213 OFFICE O/P EST LOW 20 MIN: CPT | Mod: PBBFAC | Performed by: OPHTHALMOLOGY

## 2024-08-06 PROCEDURE — 92134 CPTRZ OPH DX IMG PST SGM RTA: CPT | Mod: PBBFAC | Performed by: OPHTHALMOLOGY

## 2024-08-06 PROCEDURE — 3066F NEPHROPATHY DOC TX: CPT | Mod: CPTII,,, | Performed by: OPHTHALMOLOGY

## 2024-08-09 ENCOUNTER — TELEPHONE (OUTPATIENT)
Dept: OPHTHALMOLOGY | Facility: CLINIC | Age: 64
End: 2024-08-09
Payer: MEDICAID

## 2024-10-09 ENCOUNTER — LAB VISIT (OUTPATIENT)
Dept: LAB | Facility: HOSPITAL | Age: 64
End: 2024-10-09
Attending: INTERNAL MEDICINE
Payer: MEDICAID

## 2024-10-09 DIAGNOSIS — E11.9 TYPE 2 DIABETES MELLITUS WITHOUT COMPLICATION, WITHOUT LONG-TERM CURRENT USE OF INSULIN: ICD-10-CM

## 2024-10-09 LAB
ALBUMIN SERPL BCP-MCNC: 4.1 G/DL (ref 3.5–5.2)
ALP SERPL-CCNC: 73 U/L (ref 55–135)
ALT SERPL W/O P-5'-P-CCNC: 13 U/L (ref 10–44)
ANION GAP SERPL CALC-SCNC: 6 MMOL/L (ref 8–16)
AST SERPL-CCNC: 15 U/L (ref 10–40)
BILIRUB SERPL-MCNC: 0.4 MG/DL (ref 0.1–1)
BUN SERPL-MCNC: 10 MG/DL (ref 8–23)
CALCIUM SERPL-MCNC: 9.2 MG/DL (ref 8.7–10.5)
CHLORIDE SERPL-SCNC: 110 MMOL/L (ref 95–110)
CHOLEST SERPL-MCNC: 186 MG/DL (ref 120–199)
CHOLEST/HDLC SERPL: 3.7 {RATIO} (ref 2–5)
CO2 SERPL-SCNC: 25 MMOL/L (ref 23–29)
CREAT SERPL-MCNC: 0.8 MG/DL (ref 0.5–1.4)
ERYTHROCYTE [DISTWIDTH] IN BLOOD BY AUTOMATED COUNT: 12.4 % (ref 11.5–14.5)
EST. GFR  (NO RACE VARIABLE): >60 ML/MIN/1.73 M^2
ESTIMATED AVG GLUCOSE: 111 MG/DL (ref 68–131)
GLUCOSE SERPL-MCNC: 88 MG/DL (ref 70–110)
HBA1C MFR BLD: 5.5 % (ref 4–5.6)
HCT VFR BLD AUTO: 36.4 % (ref 37–48.5)
HDLC SERPL-MCNC: 50 MG/DL (ref 40–75)
HDLC SERPL: 26.9 % (ref 20–50)
HGB BLD-MCNC: 12.3 G/DL (ref 12–16)
LDLC SERPL CALC-MCNC: 125.2 MG/DL (ref 63–159)
MCH RBC QN AUTO: 31 PG (ref 27–31)
MCHC RBC AUTO-ENTMCNC: 33.8 G/DL (ref 32–36)
MCV RBC AUTO: 92 FL (ref 82–98)
NONHDLC SERPL-MCNC: 136 MG/DL
PLATELET # BLD AUTO: 253 K/UL (ref 150–450)
PMV BLD AUTO: 11.4 FL (ref 9.2–12.9)
POTASSIUM SERPL-SCNC: 4.2 MMOL/L (ref 3.5–5.1)
PROT SERPL-MCNC: 7.4 G/DL (ref 6–8.4)
RBC # BLD AUTO: 3.97 M/UL (ref 4–5.4)
SODIUM SERPL-SCNC: 141 MMOL/L (ref 136–145)
TRIGL SERPL-MCNC: 54 MG/DL (ref 30–150)
WBC # BLD AUTO: 5.47 K/UL (ref 3.9–12.7)

## 2024-10-09 PROCEDURE — 36415 COLL VENOUS BLD VENIPUNCTURE: CPT | Mod: PO | Performed by: INTERNAL MEDICINE

## 2024-10-09 PROCEDURE — 85027 COMPLETE CBC AUTOMATED: CPT | Performed by: INTERNAL MEDICINE

## 2024-10-09 PROCEDURE — 80061 LIPID PANEL: CPT | Performed by: INTERNAL MEDICINE

## 2024-10-09 PROCEDURE — 80053 COMPREHEN METABOLIC PANEL: CPT | Performed by: INTERNAL MEDICINE

## 2024-10-09 PROCEDURE — 83036 HEMOGLOBIN GLYCOSYLATED A1C: CPT | Performed by: INTERNAL MEDICINE

## 2024-10-16 ENCOUNTER — OFFICE VISIT (OUTPATIENT)
Dept: FAMILY MEDICINE | Facility: CLINIC | Age: 64
End: 2024-10-16
Payer: MEDICAID

## 2024-10-16 VITALS
OXYGEN SATURATION: 99 % | BODY MASS INDEX: 27.46 KG/M2 | SYSTOLIC BLOOD PRESSURE: 128 MMHG | DIASTOLIC BLOOD PRESSURE: 76 MMHG | HEART RATE: 60 BPM | TEMPERATURE: 99 F | HEIGHT: 66 IN | WEIGHT: 170.88 LBS

## 2024-10-16 DIAGNOSIS — M54.41 ACUTE MIDLINE LOW BACK PAIN WITH BILATERAL SCIATICA: Primary | ICD-10-CM

## 2024-10-16 DIAGNOSIS — I10 ESSENTIAL HYPERTENSION: ICD-10-CM

## 2024-10-16 DIAGNOSIS — Z12.11 SCREENING FOR COLON CANCER: ICD-10-CM

## 2024-10-16 DIAGNOSIS — E11.9 TYPE 2 DIABETES MELLITUS WITHOUT COMPLICATION, WITHOUT LONG-TERM CURRENT USE OF INSULIN: ICD-10-CM

## 2024-10-16 DIAGNOSIS — M54.42 ACUTE MIDLINE LOW BACK PAIN WITH BILATERAL SCIATICA: Primary | ICD-10-CM

## 2024-10-16 DIAGNOSIS — Z23 NEEDS FLU SHOT: ICD-10-CM

## 2024-10-16 DIAGNOSIS — K59.09 CHRONIC CONSTIPATION: ICD-10-CM

## 2024-10-16 DIAGNOSIS — E78.5 DYSLIPIDEMIA: ICD-10-CM

## 2024-10-16 DIAGNOSIS — R06.83 SNORING: ICD-10-CM

## 2024-10-16 PROCEDURE — 4010F ACE/ARB THERAPY RXD/TAKEN: CPT | Mod: CPTII,,, | Performed by: INTERNAL MEDICINE

## 2024-10-16 PROCEDURE — 90471 IMMUNIZATION ADMIN: CPT | Mod: PBBFAC,PO

## 2024-10-16 PROCEDURE — 90656 IIV3 VACC NO PRSV 0.5 ML IM: CPT | Mod: PBBFAC,PO

## 2024-10-16 PROCEDURE — 1160F RVW MEDS BY RX/DR IN RCRD: CPT | Mod: CPTII,,, | Performed by: INTERNAL MEDICINE

## 2024-10-16 PROCEDURE — 3078F DIAST BP <80 MM HG: CPT | Mod: CPTII,,, | Performed by: INTERNAL MEDICINE

## 2024-10-16 PROCEDURE — 3061F NEG MICROALBUMINURIA REV: CPT | Mod: CPTII,,, | Performed by: INTERNAL MEDICINE

## 2024-10-16 PROCEDURE — 3008F BODY MASS INDEX DOCD: CPT | Mod: CPTII,,, | Performed by: INTERNAL MEDICINE

## 2024-10-16 PROCEDURE — 3066F NEPHROPATHY DOC TX: CPT | Mod: CPTII,,, | Performed by: INTERNAL MEDICINE

## 2024-10-16 PROCEDURE — 3044F HG A1C LEVEL LT 7.0%: CPT | Mod: CPTII,,, | Performed by: INTERNAL MEDICINE

## 2024-10-16 PROCEDURE — 99214 OFFICE O/P EST MOD 30 MIN: CPT | Mod: S$PBB,,, | Performed by: INTERNAL MEDICINE

## 2024-10-16 PROCEDURE — 99214 OFFICE O/P EST MOD 30 MIN: CPT | Mod: PBBFAC,PO | Performed by: INTERNAL MEDICINE

## 2024-10-16 PROCEDURE — 99999PBSHW PR PBB SHADOW TECHNICAL ONLY FILED TO HB: Mod: PBBFAC,,,

## 2024-10-16 PROCEDURE — 99999 PR PBB SHADOW E&M-EST. PATIENT-LVL IV: CPT | Mod: PBBFAC,,, | Performed by: INTERNAL MEDICINE

## 2024-10-16 PROCEDURE — 1159F MED LIST DOCD IN RCRD: CPT | Mod: CPTII,,, | Performed by: INTERNAL MEDICINE

## 2024-10-16 PROCEDURE — 3074F SYST BP LT 130 MM HG: CPT | Mod: CPTII,,, | Performed by: INTERNAL MEDICINE

## 2024-10-16 RX ORDER — IBUPROFEN 800 MG/1
800 TABLET ORAL 3 TIMES DAILY
Qty: 30 TABLET | Refills: 0 | Status: SHIPPED | OUTPATIENT
Start: 2024-10-16 | End: 2024-10-26

## 2024-10-16 RX ORDER — ATORVASTATIN CALCIUM 80 MG/1
80 TABLET, FILM COATED ORAL DAILY
Qty: 90 TABLET | Refills: 3 | Status: SHIPPED | OUTPATIENT
Start: 2024-10-16

## 2024-10-16 RX ORDER — CYCLOBENZAPRINE HCL 10 MG
10 TABLET ORAL NIGHTLY
Qty: 10 TABLET | Refills: 0 | Status: SHIPPED | OUTPATIENT
Start: 2024-10-16 | End: 2024-10-26

## 2024-10-16 RX ADMIN — INFLUENZA VIRUS VACCINE 0.5 ML: 15; 15; 15 SUSPENSION INTRAMUSCULAR at 10:10

## 2024-10-16 NOTE — PROGRESS NOTES
This note was created by combination of typed  and M-Modal dictation.  Transcription errors may be present.   This note was also generated with the assistance of ambient listening technology. Verbal consent was obtained by the patient and accompanying visitor(s) for the recording of patient appointment to facilitate this note. I attest to having reviewed and edited the generated note for accuracy, though some syntax or spelling errors may persist. Please contact the author of this note for any clarification.    Assessment and Plan:   Assessment and Plan    Acute midline low back pain with bilateral sciatica  -no history of chronic low back pain.  No inciting event.  Trial of ibuprofen prescription strength.  Flexeril for nighttime use.  Cautioned about sedative side effects.  Take ibuprofen with food.  With increased intra-ocular pressure avoid steroids  -     ibuprofen (ADVIL,MOTRIN) 800 MG tablet; Take 1 tablet (800 mg total) by mouth 3 (three) times daily. for 10 days  Dispense: 30 tablet; Refill: 0  -     cyclobenzaprine (FLEXERIL) 10 MG tablet; Take 1 tablet (10 mg total) by mouth every evening. for 10 days  Dispense: 10 tablet; Refill: 0    Type 2 diabetes mellitus without complication, without long-term current use of insulin hx metformin with GI SE; saxagliptin expensive  -pre visit labs A1c stable.  Tolerating Ozempic 1 mg.  Gets nausea which is relieved with ondansetron.  No change in dose.  -     Comprehensive Metabolic Panel; Future; Expected date: 04/14/2025  -     Lipid Panel; Future; Expected date: 04/14/2025  -     Hemoglobin A1C; Future; Expected date: 04/14/2025  -     Microalbumin/Creatinine Ratio, Urine; Future; Expected date: 04/14/2025  -     CBC Without Differential; Future; Expected date: 04/16/2025    Essential hypertension hx of hctz, amlodipine, lisinopril, propranolol  -BP stable no changes    Dyslipidemia hx of crestor, lipitor  -LDL persistently greater than 100.  Reports  regular use of atorvastatin 40.  Increase atorvastatin 80  -     atorvastatin (LIPITOR) 80 MG tablet; Take 1 tablet (80 mg total) by mouth once daily.  Dispense: 90 tablet; Refill: 3    Chronic constipation  Screening for colon cancer 12/19/23 colonoscopy normal repeat 10 years  -preceding Trulicity and current Ozempic.  Has tried a number of stool softeners, enemas without lasting relief.  Remote history of MiraLax has not been using regularly.  Encouraged to take MiraLax regularly.  If resistant/persisting, she can contact me to consider Linzess  Up-to-date colonoscopy negative    Snoring  -fatigue, snoring, daytime somnolence.  In regards to fatigue, insufficient sleep hours may be significant factor.  But she would be interested in agreeable for sleep study and if positive she would be agreeable for trial of CPAP  -     Home Sleep Study; Future    Dyspnea on exertion   Upcoming consult with Cardiology    Needs flu shot  -     influenza (Flulaval, Fluzone, Fluarix) 45 mcg/0.5 mL IM vaccine (> or = 6 mo) 0.5 mL    Medications Discontinued During This Encounter   Medication Reason    atorvastatin (LIPITOR) 40 MG tablet        meds sent this encounter:     Medications Ordered This Encounter   Medications    atorvastatin (LIPITOR) 80 MG tablet     Sig: Take 1 tablet (80 mg total) by mouth once daily.     Dispense:  90 tablet     Refill:  3     Increased dose    cyclobenzaprine (FLEXERIL) 10 MG tablet     Sig: Take 1 tablet (10 mg total) by mouth every evening. for 10 days     Dispense:  10 tablet     Refill:  0    ibuprofen (ADVIL,MOTRIN) 800 MG tablet     Sig: Take 1 tablet (800 mg total) by mouth 3 (three) times daily. for 10 days     Dispense:  30 tablet     Refill:  0    influenza (Flulaval, Fluzone, Fluarix) 45 mcg/0.5 mL IM vaccine (> or = 6 mo) 0.5 mL        Follow Up:  Follow-up 6 months with labs  Future Appointments   Date Time Provider Department Center   10/16/2024 11:00 AM Diaz Yip MD [facility] FAM  MED [place]   10/18/2024 10:00 AM Danisha Workman MD [facility] CARDIO [facility] [place]   10/24/2024 10:30 AM Teresa Navarro PA-C [facility] ORTHO [place]         Subjective:   Subjective   No chief complaint on file.      GINGER Sarmiento is a 64 y.o. female.    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     Employer: [employer]      Social History     Social History Narrative    Not on file       Patient Care Team:  Diaz Yip MD as PCP - General (Internal Medicine)  Leslie Irby MA as Care Coordinator    No LMP recorded (lmp unknown). Patient has had a hysterectomy.    Last appointment with this clinic was 7/15/2024. Last visit with me 7/15/2024   To summarize last visit and events leading up to today:  Diabetes.  Trulicity possible side effect of fatigue.  Constipation which preceded Trulicity.  Offered to change, she ultimately decided to stay on Trulicity.  Want her to reestablish with digital monitoring.  Hypertension, reestablish with digital monitoring. with 40 lb weight loss I suspect her sensation of dizziness and lightheadedness are from low BP.  Stop HCTZ.  Stay on lisinopril 20.  Hyperlipidemia statin.  Hypertension    Anemia subsequent diagnosis of H pylori  12/19/23 colonoscopy normal repeat 10 years  12/19/23 EGD normal stomach, duodenum.  Path showing Brunner's gland hyperplasia.  H pylori positive  Saw GI 3/19/24. Plan is stool h pylori  Saw ophtho 4/24/24. Central retinal vein occlusion L eye  3/11/24 CBC mild anemia  9/2023 normal ferritin     Last visit with me 07/15/2024   Fatigue worsen starting Trulicity.  More recently, anemia with diagnosed with H pylori.  Check labs.  Subsequent stool H pylori negative.  Trial of Ozempic instead of Trulicity  Atypical chest pain  referred to Cardiology  Diabetes, overweight, increasing appetite.  Trial of Ozempic instead of Trulicity    CMP potassium low normal normal creatinine  CBC normal hemoglobin previously anemic subsequently treated for H pylori   Retic count normal/appropriate   Ferritin normal  A1c 5.9 on Trulicity at time of labs  Done for complaints of fatigue.    Trial of switching Trulicity to Ozempic to see if that is the cause of fatigue     ED for increased intra-ocular pressure.  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    Torsten reports low back pain that started this morning, localized to the paraspinal muscles. She initially denied radiation down the legs. The patient mentions having occasional low back pain a few months ago, but without radiation. She cannot recall any recent triggers or traumas.  No saddle dysesthesia no weakness to the legs.    The patient reports chronic constipation, ongoing for almost a year. She has bowel movements every 1-2 weeks, with straining and passing of small, hard stools. She has tried various treatments including Colace (mildly effective), Dulcolax, enemas, and suppositories, but without consistent efficacy.  Remote history of MiraLax but not currently using.    The patient is currently taking Ozempic 1 mg for diabetes. She has nausea for the first 36 hours after administration, which she manages with Zofran. Her appetite fluctuates with Ozempic; some days she has no appetite, while other days she feels very hungry and consumes 3 full meals.    The patient reports ongoing fatigue, which improved somewhat after switching from Trulicity to Ozempic. She works night shifts as a home nurse and has a part-time job during the  day. She typically gets only 3-4 hours of sleep. The patient scored 4 points on a sleep apnea questionnaire, indicating moderate to severe risk. She reports snoring and occasionally waking up gasping for air, but has never had a sleep study.    The patient mentions worsening shortness of breath on exertion. When climbing the 20 stairs to her home, she has to stop every 7 steps for about 20 minutes to catch her breath. She has chest tightness in the center of her chest that lasts for 20 minutes but does not radiate. She is scheduled to see a cardiologist in 3 days for this issue.    The patient had an episode of acute closed-angle glaucoma in her left eye in August. She was seen by ophthalmology but missed a follow-up visit due to fatigue.    Regarding her blood pressure, which is managed with lisinopril, she reports home readings with systolic between 120-150 mmHg and diastolic in the 70-80 mmHg range. Her GERD is generally well-controlled without a PPI, but she has heartburn symptoms when nauseous from the Ozempic.    Reports she was taking her statin regularly.  Reviewed her labs, LDL greater than 100.  She would be agreeable for increase in dose.    The patient denies any radiation of back pain down the legs initially, recent triggers or traumas related to back pain, weakness in legs due to back pain, problems with holding bowels or bladder, and perineal paresthesia. She denies any prior diagnosis of sleep apnea.    Medications  The patient is on Ozempic 1 mg weekly for prediabetes, which causes nausea for the first day and a half. She takes Zofran as needed for nausea caused by Ozempic. She is also on daily Lisinopril for blood pressure and Atorvastatin (Lipitor) 40 mg nightly for cholesterol. For constipation, she uses Colace, which is mildly effective, as well as Dulcolax and enema suppositories, which are not consistently effective.    ROS:  General: reports fatigue, reports loss of appetite  Cardiovascular:  "reports chest tightness  Respiratory: reports shortness of breath  Gastrointestinal: reports nausea, reports constipation, reports change in bowel habits, reports heartburn  Musculoskeletal: no back pain, no muscle weakness  Neurological: no tingling             ALLERGIES AND MEDICATIONS: updated and reviewed.  Medication List with Changes/Refills   Current Medications    ACETAZOLAMIDE (DIAMOX) 500 MG CPSR    Take 1 capsule (500 mg total) by mouth 2 (two) times daily.    ATORVASTATIN (LIPITOR) 40 MG TABLET    Take 1 tablet (40 mg total) by mouth once daily.    BLOOD-GLUCOSE METER KIT    To check BG 1 times daily, to use with insurance preferred meter    CETIRIZINE (ZYRTEC) 10 MG CAP    Take 10 mg by mouth Daily.    CICLOPIROX (PENLAC) 8 % SOLN    Apply topically nightly.    FLUTICASONE PROPIONATE (FLONASE) 50 MCG/ACTUATION NASAL SPRAY    1 spray (50 mcg total) by Each Nostril route once daily.    LISINOPRIL (PRINIVIL,ZESTRIL) 20 MG TABLET    Take 1 tablet (20 mg total) by mouth once daily.    MECLIZINE (ANTIVERT) 25 MG TABLET    Take 1 tablet (25 mg total) by mouth 3 (three) times daily as needed for Dizziness.    ONDANSETRON (ZOFRAN-ODT) 8 MG TBDL    Take 1 tablet (8 mg total) by mouth every 8 (eight) hours as needed.    SEMAGLUTIDE (OZEMPIC) 1 MG/DOSE (2 MG/1.5 ML) PNIJ    Inject 1 mg into the skin every 7 days. Month 2 onward         Objective:   Objective   Physical Exam   Vitals:    10/16/24 0952   BP: 128/76   BP Location: Left arm   Patient Position: Sitting   Pulse: 60   Temp: 98.6 °F (37 °C)   TempSrc: Oral   SpO2: 99%   Weight: 77.5 kg (170 lb 13.7 oz)   Height: 5' 6" (1.676 m)    Body mass index is 27.58 kg/m².            Physical Exam  Constitutional:       Appearance: She is well-developed.      Comments: Nontoxic but uncomfortable   Eyes:      Extraocular Movements: Extraocular movements intact.   Cardiovascular:      Rate and Rhythm: Normal rate and regular rhythm.      Heart sounds: Normal heart " sounds. No murmur heard.  Pulmonary:      Effort: Pulmonary effort is normal.      Breath sounds: Normal breath sounds.   Musculoskeletal:         General: Normal range of motion.      Right lower leg: No edema.      Left lower leg: No edema.      Comments: Seated straight leg raise elicits pain bilaterally without full extension   Skin:     General: Skin is warm and dry.   Neurological:      Mental Status: She is alert and oriented to person, place, and time.      Coordination: Coordination normal.   Psychiatric:         Behavior: Behavior normal.         Thought Content: Thought content normal.         Component      Latest Ref Rng 1/8/2024 7/15/2024 10/9/2024   Sodium      136 - 145 mmol/L   141    Potassium      3.5 - 5.1 mmol/L   4.2    Chloride      95 - 110 mmol/L   110    CO2      23 - 29 mmol/L   25    Glucose      70 - 110 mg/dL   88    BUN      8 - 23 mg/dL   10    Creatinine      0.5 - 1.4 mg/dL   0.8    Calcium      8.7 - 10.5 mg/dL   9.2    PROTEIN TOTAL      6.0 - 8.4 g/dL   7.4    Albumin      3.5 - 5.2 g/dL   4.1    BILIRUBIN TOTAL      0.1 - 1.0 mg/dL   0.4    ALP      55 - 135 U/L   73    AST      10 - 40 U/L   15    ALT      10 - 44 U/L   13    eGFR      >60 mL/min/1.73 m^2   >60.0    Anion Gap      8 - 16 mmol/L   6 (L)    WBC      3.90 - 12.70 K/uL  5.97  5.47    RBC      4.00 - 5.40 M/uL  4.00  3.97 (L)    Hemoglobin      12.0 - 16.0 g/dL  12.2  12.3    Hematocrit      37.0 - 48.5 %  37.6  36.4 (L)    MCV      82 - 98 fL  94  92    MCH      27.0 - 31.0 pg  30.5  31.0    MCHC      32.0 - 36.0 g/dL  32.4  33.8    RDW      11.5 - 14.5 %  12.7  12.4    Platelet Count      150 - 450 K/uL  229  253    MPV      9.2 - 12.9 fL  12.1  11.4    Cholesterol Total      120 - 199 mg/dL   186    Triglycerides      30 - 150 mg/dL   54    HDL      40 - 75 mg/dL   50    LDL Cholesterol      63.0 - 159.0 mg/dL   125.2    HDL/Cholesterol Ratio      20.0 - 50.0 %   26.9    Total Cholesterol/HDL Ratio      2.0 - 5.0     3.7    Non-HDL Cholesterol      mg/dL   136    Urine Microalbumin      ug/mL 6.0      Urine Creatinine      15.0 - 325.0 mg/dL 142.0      MICROALB/CREAT RATIO      0.0 - 30.0 ug/mg 4.2      Hemoglobin A1C External      4.0 - 5.6 %  5.9 (H)  5.5    Estimated Avg Glucose      68 - 131 mg/dL  123  111    Ferritin      20.0 - 300.0 ng/mL  167     Retic      0.5 - 2.5 %  1.5        Legend:  (H) High  (L) Low

## 2024-10-17 DIAGNOSIS — R52 PAIN: Primary | ICD-10-CM

## 2024-10-27 ENCOUNTER — TELEPHONE (OUTPATIENT)
Dept: PULMONOLOGY | Facility: CLINIC | Age: 64
End: 2024-10-27
Payer: MEDICAID

## 2024-11-01 ENCOUNTER — TELEPHONE (OUTPATIENT)
Dept: SLEEP MEDICINE | Facility: HOSPITAL | Age: 64
End: 2024-11-01
Payer: MEDICAID

## 2024-11-25 ENCOUNTER — HOSPITAL ENCOUNTER (EMERGENCY)
Facility: HOSPITAL | Age: 64
Discharge: HOME | End: 2024-11-25
Attending: EMERGENCY MEDICINE
Payer: MEDICAID

## 2024-11-25 VITALS
HEART RATE: 58 BPM | DIASTOLIC BLOOD PRESSURE: 80 MMHG | WEIGHT: 170.88 LBS | RESPIRATION RATE: 18 BRPM | OXYGEN SATURATION: 100 % | SYSTOLIC BLOOD PRESSURE: 150 MMHG | BODY MASS INDEX: 27.46 KG/M2 | HEIGHT: 66 IN | TEMPERATURE: 98 F

## 2024-11-25 DIAGNOSIS — H40.20X3 ANGLE-CLOSURE GLAUCOMA, SEVERE STAGE: Primary | ICD-10-CM

## 2024-11-25 PROCEDURE — 99284 EMERGENCY DEPT VISIT MOD MDM: CPT

## 2024-11-25 PROCEDURE — 25000003 PHARM REV CODE 250: Performed by: PHYSICIAN ASSISTANT

## 2024-11-25 RX ORDER — ACETAZOLAMIDE 250 MG/1
500 TABLET ORAL 2 TIMES DAILY
Qty: 28 TABLET | Refills: 0 | Status: SHIPPED | OUTPATIENT
Start: 2024-11-25 | End: 2024-11-25 | Stop reason: CLARIF

## 2024-11-25 RX ORDER — PROPARACAINE HYDROCHLORIDE 5 MG/ML
1 SOLUTION/ DROPS OPHTHALMIC
Status: COMPLETED | OUTPATIENT
Start: 2024-11-25 | End: 2024-11-25

## 2024-11-25 RX ORDER — DORZOLAMIDE HYDROCHLORIDE AND TIMOLOL MALEATE PRESERVATIVE FREE 20; 5 MG/ML; MG/ML
1 SOLUTION/ DROPS OPHTHALMIC 2 TIMES DAILY
Qty: 60 EACH | Refills: 1 | Status: SHIPPED | OUTPATIENT
Start: 2024-11-25 | End: 2024-11-25

## 2024-11-25 RX ORDER — BRIMONIDINE TARTRATE AND TIMOLOL MALEATE 2; 5 MG/ML; MG/ML
1 SOLUTION OPHTHALMIC 3 TIMES DAILY
Qty: 5 ML | Refills: 1 | Status: SHIPPED | OUTPATIENT
Start: 2024-11-25 | End: 2024-11-25

## 2024-11-25 RX ORDER — MOXIFLOXACIN 5 MG/ML
1 SOLUTION/ DROPS OPHTHALMIC 3 TIMES DAILY
Qty: 3 ML | Refills: 1 | Status: SHIPPED | OUTPATIENT
Start: 2024-11-25 | End: 2024-11-25

## 2024-11-25 RX ORDER — ACETAZOLAMIDE 250 MG/1
500 TABLET ORAL
Status: COMPLETED | OUTPATIENT
Start: 2024-11-25 | End: 2024-11-25

## 2024-11-25 RX ORDER — BRIMONIDINE TARTRATE AND TIMOLOL MALEATE 2; 5 MG/ML; MG/ML
1 SOLUTION OPHTHALMIC 3 TIMES DAILY
Qty: 5 ML | Refills: 0 | Status: SHIPPED | OUTPATIENT
Start: 2024-11-25 | End: 2025-11-25

## 2024-11-25 RX ORDER — DORZOLAMIDE HYDROCHLORIDE AND TIMOLOL MALEATE PRESERVATIVE FREE 20; 5 MG/ML; MG/ML
1 SOLUTION/ DROPS OPHTHALMIC 2 TIMES DAILY
Qty: 60 EACH | Refills: 0 | Status: SHIPPED | OUTPATIENT
Start: 2024-11-25

## 2024-11-25 RX ORDER — MOXIFLOXACIN 5 MG/ML
1 SOLUTION/ DROPS OPHTHALMIC 3 TIMES DAILY
Qty: 3 ML | Refills: 0 | Status: SHIPPED | OUTPATIENT
Start: 2024-11-25 | End: 2024-11-29

## 2024-11-25 RX ADMIN — FLUORESCEIN SODIUM 1 EACH: 1 STRIP OPHTHALMIC at 04:11

## 2024-11-25 RX ADMIN — PROPARACAINE HYDROCHLORIDE 1 DROP: 5 SOLUTION/ DROPS OPHTHALMIC at 04:11

## 2024-11-25 RX ADMIN — ACETAZOLAMIDE 500 MG: 250 TABLET ORAL at 04:11

## 2024-11-25 NOTE — FIRST PROVIDER EVALUATION
"Medical screening examination initiated.  I have conducted a focused provider triage encounter, findings are as follows:    Brief history of present illness:  Pt with PMH of hypertension, diabetes, diabetic retinopathy, central vein occlusion in the left eye with macular degeneration here with severe L eye pain, photophobia, headache, blurry vision.  Has had history of acute angle closure glaucoma.    Vitals:    11/25/24 1521   BP: 130/67   BP Location: Left arm   Pulse: 69   Resp: 16   Temp: 97.9 °F (36.6 °C)   TempSrc: Oral   SpO2: 100%   Weight: 77.5 kg (170 lb 13.7 oz)   Height: 5' 6" (1.676 m)       Pertinent physical exam:  L eye pupil fixed and mid dilated.      Brief workup plan:  Concern for acute angle closure glaucoma based on symptoms, history and fixed mid dilated L pupil.  Will try to get her as next intake eval for rapid eye examination and likely ophthalmology consult.    Preliminary workup initiated; this workup will be continued and followed by the physician or advanced practice provider that is assigned to the patient when roomed.  "

## 2024-11-25 NOTE — DISCHARGE INSTRUCTIONS
OPHTHALMOLOGY WILL FOLLOW-UP WITH YOU.    Recommendations  - Start cosopt 2x a day  - Start brimonidine 3x a day in left eye  - Start vigamox 3x a day for 3-4 days in left eye  - Will schedule follow up with patient in retina clinic. Message sent to schedulers.  - Please space out drops by at least 5 minutes        Thank you for coming to our Emergency Department today. It is important to remember that some problems are difficult to diagnose and may not be found during your Emergency Department visit. Be sure to follow up with your primary care doctor and review all labs/imaging/tests that were performed during this visit with them. Some labs/tests may be outside of the normal range and require non-emergent follow-up and further investigation to help diagnose/exclude/prevent complications or other medical conditions.    If you do not have a primary care doctor, you may contact the one listed on your discharge paperwork or you may also call the [facility] Appointment Desk at [phone] to schedule an appointment and establish care with one. It is important to your health that you have a primary care doctor.    Please take all medications as directed. All medications may potentially have side-effects and it is impossible to predict which medications may give you side-effects or what side-effects (if any) they will give you.. If you feel that you are having a negative effect or side-effect of any medication you should immediately stop taking them and seek medical attention. If you feel that you are having a life-threatening reaction call 911.    Return to the ER with any questions/concerns, new/concerning symptoms, worsening or failure to improve.     Do not drive, swim, climb to height, take a bath or make any important decisions for 24 hours if you have received any pain medications, sedatives or mood altering drugs during your ER visit.

## 2024-11-25 NOTE — ED PROVIDER NOTES
Encounter Date: 2024       History     Chief Complaint   Patient presents with    Eye Pain     Left eye pain and clear drainage since this morning, +blurred vision and light sensitivity.       64-year-old female presenting to the ER for evaluation of left eye pain.  Past medical history of hypertension, diabetes, CVO left eye, , diabetic retinopathy, macular degeneration.  Symptoms began this morning.  Endorses severe pain and blurry vision.          Review of patient's allergies indicates:   Allergen Reactions    Oxycodone-acetaminophen Hives and Itching     Past Medical History:   Diagnosis Date    Cataract     Diabetes mellitus     does not take any meds for same    High cholesterol     Hypertension      Past Surgical History:   Procedure Laterality Date    CATARACT EXTRACTION W/  INTRAOCULAR LENS IMPLANT Right 2021    Dr. Ferreira     SECTION      CHOLECYSTECTOMY      COLONOSCOPY N/A 2023    Procedure: COLONOSCOPY;  Surgeon: Ramón Upton MD;  Location: [facility] ENDOSCOPY;  Service: Endoscopy;  Laterality: N/A;  WLMeds / Referral: PEG AMBRIZ / COLTEN /  Instructions portal -   - instructions to email ([email or web address]), pre call complete.  DBM    ESOPHAGOGASTRODUODENOSCOPY N/A 2023    Procedure: EGD (ESOPHAGOGASTRODUODENOSCOPY);  Surgeon: Ramón Upton MD;  Location: [facility] ENDOSCOPY;  Service: Endoscopy;  Laterality: N/A;    EXTRACTION OF CATARACT Left 10/14/2021    Procedure: EXTRACTION, CATARACT;  Surgeon: Mina Ferreira MD;  Location: [facility] OR;  Service: Ophthalmology;  Laterality: Left;  RN Phone Pre Op. 10-6-21. Covid NEGATIVE 10-13-21. Arrival 06:00 am.  [place]    EYE SURGERY Right     catartact    HYSTERECTOMY      Age 53, Fibroids    INTRAOCULAR PROSTHESES INSERTION Right 2021    Procedure: INSERTION, IOL PROSTHESIS;  Surgeon: Mina Ferreira MD;  Location: [facility] OR;  Service: Ophthalmology;  Laterality: Right;    INTRAOCULAR PROSTHESES  INSERTION Left 10/14/2021    Procedure: INSERTION, IOL PROSTHESIS;  Surgeon: Mina Ferreira MD;  Location: [facility] OR;  Service: Ophthalmology;  Laterality: Left;    PHACOEMULSIFICATION OF CATARACT Right 9/23/2021    Procedure: PHACOEMULSIFICATION, CATARACT;  Surgeon: Mina Ferreira MD;  Location: [facility] OR;  Service: Ophthalmology;  Laterality: Right;  RN Phone Pre Op 9-20-21. Covid NEGATIVE ON  9-22-21..  Arrival 05:30 am.  C A     Family History   Problem Relation Name Age of Onset    Coronary artery disease Mother      Diabetes Mother      Hypertension Mother      Cataracts Mother      Stroke Father      Hypertension Father      Prostate cancer Father      Cataracts Father      Lung cancer Brother      Stomach cancer Brother      Glaucoma Daughter      Glaucoma Daughter      Amblyopia Neg Hx      Blindness Neg Hx      Macular degeneration Neg Hx      Retinal detachment Neg Hx      Strabismus Neg Hx       Social History     Tobacco Use    Smoking status: Never    Smokeless tobacco: Never   Substance Use Topics    Alcohol use: Yes     Comment: socially    Drug use: Never     Review of Systems   Constitutional:  Negative for fever.   HENT:  Positive for ear pain. Negative for sore throat.    Respiratory:  Negative for shortness of breath.    Cardiovascular:  Negative for chest pain.   Gastrointestinal:  Negative for nausea.   Genitourinary:  Negative for dysuria.   Musculoskeletal:  Negative for back pain.   Skin:  Negative for rash.   Neurological:  Negative for weakness.   Hematological:  Does not bruise/bleed easily.       Physical Exam     Initial Vitals [11/25/24 1521]   BP Pulse Resp Temp SpO2   130/67 69 16 97.9 °F (36.6 °C) 100 %      MAP       --         Physical Exam    Constitutional: Vital signs are normal. She appears well-developed and well-nourished.   HENT:   Head: Normocephalic and atraumatic.   Right Ear: Hearing normal.   Left Ear: Hearing normal.   Eyes: Conjunctivae are normal.   The  left pupil is fixed, mildly dilated,   Visual acuity unable to obtain secondary to pain.       Further eye exam deferred to Ophthalmology   Cardiovascular:  Normal rate and regular rhythm.           Abdominal: Abdomen is soft. Bowel sounds are normal.   Musculoskeletal:         General: Normal range of motion.     Neurological: She is alert and oriented to person, place, and time.   Skin: Skin is warm and intact.   Psychiatric: She has a normal mood and affect. Her speech is normal and behavior is normal. Cognition and memory are normal.         ED Course   Procedures  Labs Reviewed - No data to display       Imaging Results    None          Medications   bevacizumab (Avastin) 25 mg/mL ophthalmic injection syringe 1.25 mg (has no administration in time range)   proparacaine 0.5 % ophthalmic solution 1 drop (1 drop Right Eye Given by Provider 11/25/24 1600)   fluorescein ophthalmic strip 1 each (1 each Left Eye Given by Provider 11/25/24 1600)   acetaZOLAMIDE tablet 500 mg (500 mg Oral Given 11/25/24 1657)     Medical Decision Making  64-year-old female presenting with acute onset of left eye pain, history of glaucoma    Differential:  Acute angle closure glaucoma, macular degeneration, vitreous hemorrhage, traumatic iritis    Plan:  Brief exam reveals a mildly dilated fixed left pupil, unable to obtain visual acuity, high suspicion for acute angle closure glaucoma, will consult ophthalmology    Appreciate recommendations from Ophthalmology.  They have evaluated and treated the patient, pressures improved.  Intra-ocular injections given in the ED along with oral meds and eyedrops.    Patient is stable for discharge.  Prescription provided by ED staff.  Ophthalmology will arrange for follow-up.    Risk  Prescription drug management.                                      Clinical Impression:  Final diagnoses:  [H40.20X3] Angle-closure glaucoma, severe stage (Primary)          ED Disposition Condition    Discharge Stable           ED Prescriptions       Medication Sig Dispense Start Date End Date Auth. Provider    dorzolamide-timolol, PF, (COSOPT, PF,) 2-0.5 % Dpet ophthalmic solution Place 1 drop into the left eye 2 (two) times daily. 5 each 11/25/2024 -- Calvin Morocho PA-C    brimonidine-timoloL (COMBIGAN) 0.2-0.5 % Drop Place 1 drop into both eyes 3 (three) times daily. 5 mL 11/25/2024 11/25/2025 Calvin Morocho PA-C    moxifloxacin (VIGAMOX) 0.5 % ophthalmic solution Place 1 drop into the left eye 3 (three) times daily. 3 mL 11/25/2024 -- Calvin Morocho PA-C    acetaZOLAMIDE (DIAMOX) 500 mg CpSR Take 1 capsule (500 mg total) by mouth 2 (two) times daily. for 7 days 14 capsule 11/25/2024 12/2/2024 Calvin Morocho PA-C          Follow-up Information    None          Calvin Mroocho PA-C  11/25/24 9287

## 2024-11-25 NOTE — LETTER
November 25, 2024    Otis Cano  [phone]             [facility]  [address]  [place] [address]  Phone: [phone]  Fax: [phone] November 25, 2024     Patient: Torsten Trevizo    YOB: 1960   Date of Visit: 11/25/2024     To Whom It May Concern,    I am writing to request prior authorization for the continued use of Ozempic (semaglutide) for my patient, Torsten Trevizo, who has been under my care since March 13, 2020. Ms. Trevizo is a 64-year-old female diagnosed with type 2 diabetes mellitus.    Ms. Trevizo has a history of intolerance and inadequate response to other diabetes medications. She was previously prescribed Metformin, which resulted in intolerable gastrointestinal side effects, leading to its discontinuation. Subsequently, she was trialed on Saxagliptin, but was unable to maintain its use due to suboptimal glycemic control and adverse reactions.    Since initiating Ozempic in August 2024, Ms. Trevizo has experienced significantly improved glycemic control. This medication has proven effective in managing her diabetes, with a noted improvement in her blood glucose levels and overall health outcomes. Given these positive results, it is crucial that she continues with Ozempic to maintain her current level of diabetes management.    Ozempic is a GLP-1 receptor agonist known for its efficacy and safety profile, particularly in patients who have had difficulties with other treatments. It is in Ms. Trevizo's best interest to continue this therapy to prevent complications associated with poorly controlled diabetes.    I kindly request that you approve the prior authorization for the continued use of Ozempic for Ms. Trevizo. Enclosed, please find the relevant medical records and documentation supporting this request. If you require any further information or clarification, please do not hesitate to contact my office directly at [phone].    Thank  you for your attention to this matter and for your support in providing optimal care for our patients.    Sincerely,    Diaz Yip MD

## 2024-11-25 NOTE — TELEPHONE ENCOUNTER
Received notice from insurance that Ozempic requires prior authorization.  She has an established diagnosis of diabetes.    We will fax prior authorization request letter  f [phone]

## 2024-11-25 NOTE — CONSULTS
"Consultation Report  Ophthalmology Service    Date: 2024    Reason for Consult: "L eye pain"     History of Present Illness: Torsten Trevizo is a 64 y.o. female with POHx of CRVO OS c/b NVG who presented to [facility] ED for eye pain left eye. Ophthalmology is being consulted to evaluate for acute angle closure glaucoma. Patient reports pain in left eye that began around 6 AM today. Patient came straight to [facility] because of similar episode a few months ago. No flashes, floaters, or curtain veil or vision.    POcularHx: See above for history of ocular problems. Patient denies wearing contact lenses. Patient wears glasses. Patient denies previous trauma to the eye. Patient denies history of ocular surgeries.    Current eye gtts: Denies     Family Hx: Denies family history of glaucoma, macular degeneration, or blindness. family history includes Cataracts in her father and mother; Coronary artery disease in her mother; Diabetes in her mother; Glaucoma in her daughter and daughter; Hypertension in her father and mother; Lung cancer in her brother; Prostate cancer in her father; Stomach cancer in her brother; Stroke in her father.     PMHx:  has a past medical history of Cataract, Diabetes mellitus, High cholesterol, and Hypertension.     PSurgHx:  has a past surgical history that includes  section; Hysterectomy; Cholecystectomy; Phacoemulsification of cataract (Right, 2021); Intraocular prosthesis insertion (Right, 2021); Cataract extraction w/  intraocular lens implant (Right, 2021); Eye surgery (Right); Extraction of cataract (Left, 10/14/2021); Intraocular prosthesis insertion (Left, 10/14/2021); Esophagogastroduodenoscopy (N/A, 2023); and Colonoscopy (N/A, 2023).     Home Medications:   Prior to Admission medications    Medication Sig Start Date End Date Taking? Authorizing Provider   atorvastatin (LIPITOR) 40 MG tablet Take 1 tablet (40 mg total) by mouth once daily. 24   Dair, " Diaz NOONAN MD   blood-glucose meter kit To check BG 1 times daily, to use with insurance preferred meter 3/13/20   Diaz Yip MD   cetirizine (ZYRTEC) 10 mg Cap Take 10 mg by mouth Daily. 7/15/24   Diaz Yip MD   ciclopirox (PENLAC) 8 % Soln Apply topically nightly. 6/5/23   Sweta Castillo DPM   fluticasone propionate (FLONASE) 50 mcg/actuation nasal spray 1 spray (50 mcg total) by Each Nostril route once daily. 7/15/24   Diaz Yip MD   lisinopriL (PRINIVIL,ZESTRIL) 20 MG tablet Take 1 tablet (20 mg total) by mouth once daily. 6/2/24   Diaz Yip MD   meclizine (ANTIVERT) 25 mg tablet Take 1 tablet (25 mg total) by mouth 3 (three) times daily as needed for Dizziness. 12/8/23   Diaz Yip MD   ondansetron (ZOFRAN-ODT) 8 MG TbDL Take 1 tablet (8 mg total) by mouth every 8 (eight) hours as needed. 12/8/23   Diaz Yip MD   semaglutide (OZEMPIC) 0.25 mg or 0.5 mg (2 mg/3 mL) pen injector Inject 0.5 mg into the skin every 7 days. 7/15/24 8/14/24  Diaz Yip MD   semaglutide (OZEMPIC) 1 mg/dose (2 mg/1.5 mL) PnIj Inject 1 mg into the skin every 7 days. Month 2 onward 8/15/24   Diaz Yip MD        Medications this encounter:    bevacizumab  1.25 mg Intraocular 1 time in Clinic/HOD       Allergies: is allergic to oxycodone-acetaminophen.     Social:  reports that she has never smoked. She has never used smokeless tobacco. She reports current alcohol use. She reports that she does not use drugs.     ROS: As per HPI    Ocular examination/Dilated fundus examination:  Base Eye Exam       Visual Acuity (Snellen - Linear)         Right Left    Dist sc 20/20 LP              Tonometry (Tonopen, 4:06 PM)         Right Left    Pressure 19 54              Tonometry #2 (Tonopen, 5:48 PM)         Right Left    Pressure  45              Tonometry #3 (Tonopen, 5:49 PM)         Right Left    Pressure  12              Gonioscopy (Zeiss, four mirror)         Right Left    Superior  Poor  view but appears open with NVI, appears to have PAS but unable to clearly visualize              Pupils         Dark Light Shape React APD    Right 4 2 Round Brisk None    Left 4 4 Round Minimal 4+              Visual Fields (Counting fingers)         Right Left     Full     Restrictions  Total superior temporal, inferior temporal, superior nasal, inferior nasal deficiencies              Extraocular Movement         Right Left     Full Full   Sensory XT             Neuro/Psych       Oriented x3: Yes    Mood/Affect: Normal                  Slit Lamp and Fundus Exam       External Exam         Right Left    External Normal Normal              Slit Lamp Exam         Right Left    Lids/Lashes Dermatochalasis - upper lid Dermatochalasis - upper lid    Conjunctiva/Sclera Pigmentation, Pinguecula Pigmentation, Pinguecula    Cornea Clear Stromal edema centrally without notable bullae    Anterior Chamber Deep and quiet Deep and quiet, layered hypema ~1mm inferiorly and fibrotic/clotted heme Inferior-temporal    Iris Round and reactive Round and sluggish, 360 fine NVI    Lens PCIOL Posterior chamber intraocular lens    Anterior Vitreous Normal Normal              Fundus Exam         Right Left    Disc Pink, sharp Poor view    C/D Ratio 0.2     Macula Flat, no holes Poor view but appears flat with hemorrhage    Vessels Normal caliber Poor view but appears tortuous    Periphery No holes, tears, detachments Poor view                      Assessment/Plan:     # NVG, OS  # History of CRVO, OS  - Patient noted to have CRVO on 11/2023  - Follows with [facility] for IVAs for NVI. Last TANNER 8/3/2024, due for appointment with Dr. Hidalgo but missed appointment and never rescheduled.  - s/p PRP 5/14/2024, 6/11/2024  - VA LP on presentation, baseline with interval decrease from  to   - Initial IOP: 54 -> 45 after drops  - Gonioscopy: poor view but appears open with NVI, appears to have PAS but unable to clearly visualize OS  - No KP,  "posterior synechiae, or glaucomflecken  - Patient received multiple rounds of cosopt, brimonidine and 500 mg PO diamox  - Proceeded with IV Avastin and AC tap OS. R/B/A explained to patient. Consent signed and witnessed. IOP 12 mmHg after tap. See procedure note for details.    This patient has missed multiple appointments in retina clinic and has needed to come to the ED for acute pain episodes. In my opinion, the patient has capacity and insight into her deteriorating condition. The patient is clinically sober, free from distracting injury, appears to have intact insight and judgment and reason, and in my opinion has capacity to make decisions. I explained to the patient that her symptoms may represent recurrent NVG and progression of her optic neuropathy and the patient verbalized understanding of my concerns and stated "When I am not in pain, I can see everything with my right eye open and left eye closed." She states that this situation works for her needs and ADLs and her main concern at this moment is the pain and not the loss of vision in her left eye because "it is not used much already."    I had a discussion with the patient about her exam and results, and that they may still have usable vision of HM or LP despite progression of her disease. I informed the patient that the next step in diagnosis and treatment would be to get intra-vitreal injection of avastin today and come to appointments as scheduled (including re-scheduling appointments that were missed), and they verbalized understanding of this as well. I explained the risks of not attending regular retina appointments, which included reasonably foreseeable complications such as serious injury, permanent disability, infection, bleeding inside the eye, retinal tears/detachments, double vision, cosmetic or other deformity, damage to the optic nerve, loss of vision, and loss of eye. I discussed that if her vision goes to NLP, the only intervention that " "we can offer her will be removal of the eyeball (ie, enucleation). The patient exclaimed "Really? I will need to talk to my family about that if it is the case!" At the end of the discussion, the patient elected to get the injection as recommended and come to her follow up appointments.       Recommendations  - Continue cosopt BID and brimonidine TID in left eye  - Start vigamox TID for 3-4 days in left eye  - Pain control with PO tylenol prn  - Will schedule follow up with patient in retina clinic. Message sent to schedulers.  - Please space out drops by at least 5 minutes    Discussed with Dr. Knight    Patient's Best Contact Number: [phone]     Dandre Donaldson  [facility] Ophthalmology, PGY-2   11/25/2024, 3:36 PM    Procedure Note:     Procedure: lntravitreal injection of Avastin and AC paracentesis, left eye    Route of administration: lntravitreal  Dosage: 1.25mg  Amount administered: 0.05cc    Pre and post procedure Diagnosis: Neovascular glaucoma, left eye    Exp: 1/18/2025  Lot #: [id number]    Description of Procedure:   I have explained the risks, benefits, and alternatives of the procedure in detail. The patient voices understanding, and all questions have been answered.  The patient agrees to proceed as planned. Signed consent was obtained, the site was marked, and a timeout was performed. Topical proparacaine 0.5% was used for anesthesia. Injection volume was set to 0.05 mL in the provided syringe. A lid speculum was placed. Topical Povidone-Iodine 5% was used for antisepsis. The injection was then placed 3.5-4 mm posterior to the inferotemporal limbus. Anterior chamber tap performed with a 30 gauge needle on a TB syringe taking care to avoid the described hyphema in ophthalmology noted dated 11/25/24 by Dandre Donaldson MD. No reflux of blood noted into needle tip while performing AC tap; about 0.05 mL was removed via passive flow. IOP confirmed to be 12 mmHg with tonopen. The eye was irrigated " with BSS. Patient tolerated well with no complications. Patient told to return immediately for vision loss or significant pain develops. The patient was educated that mild irritation tonight and tomorrow was normal secondary to topical Povidone-Iodine use. We discussed warning signs of endophthalmitis, retinal detachment, and high eye pressure, and the patient was further educated that if significant pain or vision loss occurred, they should return immediately to our clinic or the ED.

## 2024-11-25 NOTE — ED NOTES
Woke up with severe pain to left eye; no trauma. Pt has been getting eye injections for glaucoma. Left side vision is blurry. When light is shined into right eye, the left eye hurts. + tearing

## 2024-11-26 ENCOUNTER — NURSE TRIAGE (OUTPATIENT)
Dept: ADMINISTRATIVE | Facility: CLINIC | Age: 64
End: 2024-11-26
Payer: MEDICAID

## 2024-11-26 ENCOUNTER — LAB VISIT (OUTPATIENT)
Dept: LAB | Facility: HOSPITAL | Age: 64
End: 2024-11-26
Payer: MEDICAID

## 2024-11-26 ENCOUNTER — OFFICE VISIT (OUTPATIENT)
Dept: OPHTHALMOLOGY | Facility: CLINIC | Age: 64
End: 2024-11-26
Payer: MEDICAID

## 2024-11-26 DIAGNOSIS — H21.02 HYPHEMA, LEFT EYE: ICD-10-CM

## 2024-11-26 DIAGNOSIS — H40.52X3 NEOVASCULAR GLAUCOMA OF LEFT EYE, SEVERE STAGE: ICD-10-CM

## 2024-11-26 DIAGNOSIS — H21.02 HYPHEMA, LEFT EYE: Primary | ICD-10-CM

## 2024-11-26 DIAGNOSIS — H34.8120 CENTRAL RETINAL VEIN OCCLUSION WITH MACULAR EDEMA OF LEFT EYE: ICD-10-CM

## 2024-11-26 LAB — HGB S BLD QL SOLY: NEGATIVE

## 2024-11-26 PROCEDURE — 36415 COLL VENOUS BLD VENIPUNCTURE: CPT

## 2024-11-26 PROCEDURE — 1159F MED LIST DOCD IN RCRD: CPT | Mod: CPTII,,, | Performed by: OPHTHALMOLOGY

## 2024-11-26 PROCEDURE — 99211 OFF/OP EST MAY X REQ PHY/QHP: CPT | Mod: PBBFAC | Performed by: OPHTHALMOLOGY

## 2024-11-26 PROCEDURE — 3061F NEG MICROALBUMINURIA REV: CPT | Mod: CPTII,,, | Performed by: OPHTHALMOLOGY

## 2024-11-26 PROCEDURE — 1160F RVW MEDS BY RX/DR IN RCRD: CPT | Mod: CPTII,,, | Performed by: OPHTHALMOLOGY

## 2024-11-26 PROCEDURE — 99999 PR PBB SHADOW E&M-EST. PATIENT-LVL I: CPT | Mod: PBBFAC,,, | Performed by: OPHTHALMOLOGY

## 2024-11-26 PROCEDURE — 4010F ACE/ARB THERAPY RXD/TAKEN: CPT | Mod: CPTII,,, | Performed by: OPHTHALMOLOGY

## 2024-11-26 PROCEDURE — 3066F NEPHROPATHY DOC TX: CPT | Mod: CPTII,,, | Performed by: OPHTHALMOLOGY

## 2024-11-26 PROCEDURE — 3044F HG A1C LEVEL LT 7.0%: CPT | Mod: CPTII,,, | Performed by: OPHTHALMOLOGY

## 2024-11-26 PROCEDURE — 85660 RBC SICKLE CELL TEST: CPT

## 2024-11-26 PROCEDURE — 92014 COMPRE OPH EXAM EST PT 1/>: CPT | Mod: S$PBB,,, | Performed by: OPHTHALMOLOGY

## 2024-11-26 RX ORDER — PREDNISOLONE ACETATE 10 MG/ML
1 SUSPENSION/ DROPS OPHTHALMIC 4 TIMES DAILY
Qty: 5 ML | Refills: 0 | Status: SHIPPED | OUTPATIENT
Start: 2024-11-26 | End: 2025-11-26

## 2024-11-26 RX ORDER — ATROPINE SULFATE 10 MG/ML
1 SOLUTION/ DROPS OPHTHALMIC 2 TIMES DAILY
Qty: 5 ML | Refills: 1 | Status: SHIPPED | OUTPATIENT
Start: 2024-11-26

## 2024-11-26 RX ORDER — ACETAZOLAMIDE 500 MG/1
500 CAPSULE, EXTENDED RELEASE ORAL 2 TIMES DAILY
Qty: 60 CAPSULE | Refills: 0 | Status: SHIPPED | OUTPATIENT
Start: 2024-11-26 | End: 2024-12-26

## 2024-11-26 NOTE — PROCEDURES
Procedure: lntravitreal injection of Avastin and AC paracentesis, left eye     Route of administration: lntravitreal  Dosage: 1.25mg  Amount administered: 0.05cc     Pre and post procedure Diagnosis: Neovascular glaucoma, left eye     Exp: 1/18/2025  Lot #: [id number]     Description of Procedure:   I have explained the risks, benefits, and alternatives of the procedure in detail. The patient voices understanding, and all questions have been answered.  The patient agrees to proceed as planned. Signed consent was obtained, the site was marked, and a timeout was performed. Topical proparacaine 0.5% was used for anesthesia. Injection volume was set to 0.05 mL in the provided syringe. A lid speculum was placed. Topical Povidone-Iodine 5% was used for antisepsis. The injection was then placed 3.5-4 mm posterior to the inferotemporal limbus. Anterior chamber tap performed with a 30 gauge needle on a TB syringe taking care to avoid the described hyphema in ophthalmology noted dated 11/25/24 by Dandre Donaldson MD. No reflux of blood noted into needle tip while performing AC tap; about 0.05 mL was removed via passive flow. IOP confirmed to be 12 mmHg with tonopen. The eye was irrigated with BSS. Patient tolerated well with no complications. Patient told to return immediately for vision loss or significant pain develops. The patient was educated that mild irritation tonight and tomorrow was normal secondary to topical Povidone-Iodine use. We discussed warning signs of endophthalmitis, retinal detachment, and high eye pressure, and the patient was further educated that if significant pain or vision loss occurred, they should return immediately to our clinic or the ED.

## 2024-11-26 NOTE — PROGRESS NOTES
Retina Clinic Progress Note    Encounter Date: 11/26/2024    Subjective:     Chief Complaint   Patient presents with    Eye Problem    Urgent ED fu OCT/ OS    Pt given Avastin OS in ED yesterday  Gtts:  Cosopt BID OS  Combigan TID OS  Vig TID OS  Diamox 500 mg BID    Objective:     Visual Acuity (Snellen - Linear)         Right Left    Dist sc 20/20 HM          Tonometry       Tonometry (Applanation, 2:08 PM)         Right Left    Pressure 12 44                   Main Ophthalmology Exam       External Exam         Right Left    External Normal Normal              Slit Lamp Exam         Right Left    Lids/Lashes Dermatochalasis - upper lid Dermatochalasis - upper lid    Conjunctiva/Sclera Pigmentation, Pinguecula Pigmentation, Pinguecula    Cornea Clear Extensive Dfolds, Stromal edema centrally without notable bullae    Anterior Chamber Deep and quiet Deep and quiet, layered hypema ~1.5 mm inferiorly    Iris Round and reactive Round and sluggish, 360 fine NVI starting to resolve    Lens PCIOL Posterior chamber intraocular lens    Anterior Vitreous Normal Normal                    Study Findings 11/26/2024:  No OCT mac performed at clinic visit today.    Last A1c in chart: 5.5% on 10/09/2024     Assessment and Plan:     # NVG, OS  # History of CRVO, OS  - Patient noted to have CRVO on 11/2023  - Follows with [facility] for IVAs for NVI  - Injection history (OS only):  - TANNER: unknown x 4, 8/3/24, 11/25/24  - Laser history (OS only):  - s/p PRP 5/14/2024, 6/11/2024  - VA HM today, baseline with interval decrease from CF to HM  - Gonioscopy: poor view but appears open with NVI, appears to have PAS but unable to clearly visualize OS  - No KP, posterior synechiae, or glaucomflecken  - Patient reports taking cosopt and brimonidine as instructed at home, here for return of eye pain after being seen in the ED yesterday  - VA improved from LP to HM today likely in the setting of improved cornea, IOP 44 mmHg  - Hyphema stable  (see below); NVI starting to improve after TANNER administered yesterday    # Non-traumatic hyphema, left eye  - Secondary to rubeosis (see above)  - VA at baseline, IOP 44  - Layered hyphema of 1.5 mm noted inferiorly  - Patient counseled to limit his activity over the next week. Instructed to keep head of bed elevated to allow blood to settle. Also instructed to avoid any strenuous activity such as bending, lifting, straining.  - Avoid antiplatelet/anticoagulant medications (i.e. Aspirin, NSAIDs, warfarin, etc) unless medically indicated  - Mild analgesics such as Tylenol PRN  - Return precautions reviewed with patient (sudden increase in pain or decrease in vision)    Plan:  - Continue cosopt BID and brimonidine TID in left eye  - Continue vigamox TID for 2-3 days in left eye  - Instructed to space out drops by at least 5 minutes  - Start atropine 1% BID in left eye  - Start Prednisolone acetate 1% QID in left eye  - Start PO diamox 500 mg BID  - Check sickle cell screen, sent to get labs today  - Eye shield over left eye at all times other than when placing drops    Follow up:   [facility] retina as scheduled or sooner prn

## 2024-11-26 NOTE — TELEPHONE ENCOUNTER
Patient states she was calling about her eye drops. She says she was able to get them now. No longer needs triage.     Reason for Disposition   Caller has cancelled the call before the first contact    Protocols used: No Contact or Duplicate Contact Call-A-AH

## 2024-12-05 ENCOUNTER — OFFICE VISIT (OUTPATIENT)
Dept: OPHTHALMOLOGY | Facility: CLINIC | Age: 64
End: 2024-12-05

## 2024-12-05 DIAGNOSIS — H40.52X2: Primary | ICD-10-CM

## 2024-12-05 DIAGNOSIS — H21.02: Primary | ICD-10-CM

## 2024-12-05 DIAGNOSIS — H34.8120 CENTRAL RETINAL VEIN OCCLUSION WITH MACULAR EDEMA OF LEFT EYE: ICD-10-CM

## 2024-12-05 DIAGNOSIS — Z96.1 PSEUDOPHAKIA OF BOTH EYES: ICD-10-CM

## 2024-12-05 DIAGNOSIS — H40.52X2 NEOVASCULAR GLAUCOMA OF LEFT EYE, MODERATE STAGE: ICD-10-CM

## 2024-12-05 PROBLEM — H25.11 NUCLEAR SCLEROTIC CATARACT OF RIGHT EYE: Status: RESOLVED | Noted: 2021-09-23 | Resolved: 2024-12-05

## 2024-12-05 PROBLEM — H25.12 NUCLEAR SCLEROTIC CATARACT OF LEFT EYE: Status: RESOLVED | Noted: 2021-10-14 | Resolved: 2024-12-05

## 2024-12-05 PROCEDURE — 99999 PR PBB SHADOW E&M-EST. PATIENT-LVL III: CPT | Mod: PBBFAC,,, | Performed by: OPHTHALMOLOGY

## 2024-12-05 PROCEDURE — 99213 OFFICE O/P EST LOW 20 MIN: CPT | Mod: PBBFAC | Performed by: OPHTHALMOLOGY

## 2024-12-05 PROCEDURE — 92020 GONIOSCOPY: CPT | Mod: PBBFAC | Performed by: OPHTHALMOLOGY

## 2024-12-05 NOTE — PROGRESS NOTES
HPI    DLS: 11/26/2024  ED follow up   Pt was seen in the ED for non traumatic and elevated IOP  Pt rec'd an avastin injection   Stopped taking Cosopt -burning     NVG OS   HX CRVO     Diamox 500mg BID PO   Atropine BID OS   Combigan BID OS  PF TID OS       Last edited by Liliana Ashley MA on 12/5/2024  1:23 PM.            Assessment /Plan     For exam results, see Encounter Report.    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, moderate stage    Pseudophakia of both eyes    Central retinal vein occlusion with macular edema of left eye    Neovascular glaucoma of left eye, moderate stage        Patient with daughter     # Hyphema, OS  # NVG, OS  # History of CRVO, OS  - Patient noted to have CRVO on 11/2023  - Follows with [facility] for IVAs for NVI  - Injection history (OS only):  - TANNER: unknown x 4, 8/3/24, 11/25/24  - Laser history (OS only):  - s/p PRP 5/14/2024, 6/11/2024  - VA HM today, baseline with interval decrease from CF to HM  - Gonioscopy: poor view but appears open with NVI, appears to have PAS but unable to clearly visualize OS  - No KP, posterior synechiae, or glaucomflecken  - Patient reports taking cosopt and brimonidine as instructed at home, here for return of eye pain after being seen in the ED yesterday  - VA improved from LP to HM today likely in the setting of improved cornea, IOP 44 mmHg  - Hyphema stable (see below)    # Non-traumatic hyphema, left eye  Neg SS  - Secondary to rubeosis (see above)  - VA at baseline, IOP 44 --> resolving & improving IOP & comfortable   - Layered hyphema of 3 mm noted inferiorly    - Patient counseled to limit his activity over the next week. Instructed to keep head of bed elevated to allow blood to settle. Also instructed to avoid any strenuous activity such as bending, lifting, straining.  - Avoid antiplatelet/anticoagulant medications (i.e. Aspirin, NSAIDs, warfarin, etc) unless medically indicated  - Mild analgesics such as Tylenol PRN  - Return  precautions reviewed with patient (sudden increase in pain or decrease in vision)    Plan:    Consider GDD // G-probe dicussed --> allow for hyphema clearance vs washout  - Continue cosopt BID and brimonidine TID --> BID in left eye  - Hold vigamox TID for 2-3 days in left eye  - Instructed to space out drops by at least 5 minutes  - Cont atropine 1% BID in left eye --> can use q day 2/2 drop burden  - Cont Prednisolone acetate 1% QID in left eye --> consider BID 2/2 burden  - contPO diamox 500 mg BID --> tolerating well     - Consider Eye shield over left eye at all times other than when placing drops    Follow up:   [facility] retina as scheduled or sooner prn

## 2024-12-07 ENCOUNTER — HOSPITAL ENCOUNTER (EMERGENCY)
Facility: HOSPITAL | Age: 64
Discharge: HOME | End: 2024-12-07
Attending: EMERGENCY MEDICINE

## 2024-12-07 VITALS
WEIGHT: 145 LBS | BODY MASS INDEX: 23.3 KG/M2 | RESPIRATION RATE: 18 BRPM | DIASTOLIC BLOOD PRESSURE: 59 MMHG | TEMPERATURE: 98 F | SYSTOLIC BLOOD PRESSURE: 119 MMHG | OXYGEN SATURATION: 100 % | HEART RATE: 61 BPM | HEIGHT: 66 IN

## 2024-12-07 DIAGNOSIS — T50.905A ADVERSE EFFECT OF DRUG, INITIAL ENCOUNTER: ICD-10-CM

## 2024-12-07 DIAGNOSIS — H40.1190 CHRONIC GLAUCOMA: ICD-10-CM

## 2024-12-07 DIAGNOSIS — K85.90 ACUTE PANCREATITIS, UNSPECIFIED COMPLICATION STATUS, UNSPECIFIED PANCREATITIS TYPE: ICD-10-CM

## 2024-12-07 DIAGNOSIS — N30.00 ACUTE CYSTITIS WITHOUT HEMATURIA: Primary | ICD-10-CM

## 2024-12-07 LAB
ALBUMIN SERPL BCP-MCNC: 4.4 G/DL (ref 3.5–5.2)
ALP SERPL-CCNC: 84 U/L (ref 40–150)
ALT SERPL W/O P-5'-P-CCNC: 36 U/L (ref 10–44)
ANION GAP SERPL CALC-SCNC: 9 MMOL/L (ref 8–16)
AST SERPL-CCNC: 18 U/L (ref 10–40)
BACTERIA #/AREA URNS HPF: ABNORMAL /HPF
BASOPHILS # BLD AUTO: 0.05 K/UL (ref 0–0.2)
BASOPHILS NFR BLD: 0.8 % (ref 0–1.9)
BILIRUB SERPL-MCNC: 0.5 MG/DL (ref 0.1–1)
BILIRUB UR QL STRIP: NEGATIVE
BUN SERPL-MCNC: 21 MG/DL (ref 8–23)
CALCIUM SERPL-MCNC: 9.6 MG/DL (ref 8.7–10.5)
CHLORIDE SERPL-SCNC: 117 MMOL/L (ref 95–110)
CLARITY UR: ABNORMAL
CO2 SERPL-SCNC: 18 MMOL/L (ref 23–29)
COLOR UR: YELLOW
CREAT SERPL-MCNC: 1.1 MG/DL (ref 0.5–1.4)
CTP QC/QA: YES
CTP QC/QA: YES
DIFFERENTIAL METHOD BLD: ABNORMAL
EOSINOPHIL # BLD AUTO: 0.4 K/UL (ref 0–0.5)
EOSINOPHIL NFR BLD: 6.1 % (ref 0–8)
ERYTHROCYTE [DISTWIDTH] IN BLOOD BY AUTOMATED COUNT: 13 % (ref 11.5–14.5)
EST. GFR  (NO RACE VARIABLE): 57 ML/MIN/1.73 M^2
GLUCOSE SERPL-MCNC: 74 MG/DL (ref 70–110)
GLUCOSE UR QL STRIP: NEGATIVE
HCT VFR BLD AUTO: 32.2 % (ref 37–48.5)
HGB BLD-MCNC: 11.4 G/DL (ref 12–16)
HGB UR QL STRIP: NEGATIVE
HYALINE CASTS #/AREA URNS LPF: 4 /LPF
IMM GRANULOCYTES # BLD AUTO: 0.01 K/UL (ref 0–0.04)
IMM GRANULOCYTES NFR BLD AUTO: 0.2 % (ref 0–0.5)
KETONES UR QL STRIP: NEGATIVE
LEUKOCYTE ESTERASE UR QL STRIP: ABNORMAL
LIPASE SERPL-CCNC: 63 U/L (ref 4–60)
LYMPHOCYTES # BLD AUTO: 2.2 K/UL (ref 1–4.8)
LYMPHOCYTES NFR BLD: 34.6 % (ref 18–48)
MCH RBC QN AUTO: 31.7 PG (ref 27–31)
MCHC RBC AUTO-ENTMCNC: 35.4 G/DL (ref 32–36)
MCV RBC AUTO: 89 FL (ref 82–98)
MICROSCOPIC COMMENT: ABNORMAL
MONOCYTES # BLD AUTO: 0.4 K/UL (ref 0.3–1)
MONOCYTES NFR BLD: 6.9 % (ref 4–15)
NEUTROPHILS # BLD AUTO: 3.2 K/UL (ref 1.8–7.7)
NEUTROPHILS NFR BLD: 51.4 % (ref 38–73)
NITRITE UR QL STRIP: NEGATIVE
NRBC BLD-RTO: 0 /100 WBC
PH UR STRIP: 7 [PH] (ref 5–8)
PLATELET # BLD AUTO: 252 K/UL (ref 150–450)
PMV BLD AUTO: 10.5 FL (ref 9.2–12.9)
POC MOLECULAR INFLUENZA A AGN: NEGATIVE
POC MOLECULAR INFLUENZA B AGN: NEGATIVE
POCT GLUCOSE: 75 MG/DL (ref 70–110)
POTASSIUM SERPL-SCNC: 3 MMOL/L (ref 3.5–5.1)
PROT SERPL-MCNC: 7.9 G/DL (ref 6–8.4)
PROT UR QL STRIP: ABNORMAL
RBC # BLD AUTO: 3.6 M/UL (ref 4–5.4)
RBC #/AREA URNS HPF: 6 /HPF (ref 0–4)
SARS-COV-2 RDRP RESP QL NAA+PROBE: NEGATIVE
SODIUM SERPL-SCNC: 144 MMOL/L (ref 136–145)
SP GR UR STRIP: 1.03 (ref 1–1.03)
SQUAMOUS #/AREA URNS HPF: 1 /HPF
URN SPEC COLLECT METH UR: ABNORMAL
UROBILINOGEN UR STRIP-ACNC: ABNORMAL EU/DL
WBC # BLD AUTO: 6.25 K/UL (ref 3.9–12.7)
WBC #/AREA URNS HPF: 91 /HPF (ref 0–5)
WBC CLUMPS URNS QL MICRO: ABNORMAL

## 2024-12-07 PROCEDURE — 63600175 PHARM REV CODE 636 W HCPCS

## 2024-12-07 PROCEDURE — 99284 EMERGENCY DEPT VISIT MOD MDM: CPT | Mod: 25

## 2024-12-07 PROCEDURE — 80053 COMPREHEN METABOLIC PANEL: CPT

## 2024-12-07 PROCEDURE — 87086 URINE CULTURE/COLONY COUNT: CPT

## 2024-12-07 PROCEDURE — 96361 HYDRATE IV INFUSION ADD-ON: CPT

## 2024-12-07 PROCEDURE — 96375 TX/PRO/DX INJ NEW DRUG ADDON: CPT

## 2024-12-07 PROCEDURE — 82962 GLUCOSE BLOOD TEST: CPT

## 2024-12-07 PROCEDURE — 25000003 PHARM REV CODE 250

## 2024-12-07 PROCEDURE — 96374 THER/PROPH/DIAG INJ IV PUSH: CPT

## 2024-12-07 PROCEDURE — 81000 URINALYSIS NONAUTO W/SCOPE: CPT

## 2024-12-07 PROCEDURE — 87635 SARS-COV-2 COVID-19 AMP PRB: CPT

## 2024-12-07 PROCEDURE — 83690 ASSAY OF LIPASE: CPT

## 2024-12-07 PROCEDURE — 87502 INFLUENZA DNA AMP PROBE: CPT

## 2024-12-07 PROCEDURE — 87186 SC STD MICRODIL/AGAR DIL: CPT

## 2024-12-07 PROCEDURE — 87088 URINE BACTERIA CULTURE: CPT

## 2024-12-07 PROCEDURE — 85025 COMPLETE CBC W/AUTO DIFF WBC: CPT

## 2024-12-07 RX ORDER — FAMOTIDINE 20 MG/1
20 TABLET, FILM COATED ORAL ONCE AS NEEDED
Qty: 5 TABLET | Refills: 0 | Status: SHIPPED | OUTPATIENT
Start: 2024-12-07

## 2024-12-07 RX ORDER — ONDANSETRON 4 MG/1
4 TABLET, ORALLY DISINTEGRATING ORAL EVERY 8 HOURS PRN
Qty: 15 TABLET | Refills: 0 | Status: SHIPPED | OUTPATIENT
Start: 2024-12-07 | End: 2024-12-12

## 2024-12-07 RX ORDER — NITROFURANTOIN 25; 75 MG/1; MG/1
100 CAPSULE ORAL 2 TIMES DAILY
Qty: 10 CAPSULE | Refills: 0 | Status: SHIPPED | OUTPATIENT
Start: 2024-12-07 | End: 2024-12-07

## 2024-12-07 RX ORDER — NITROFURANTOIN 25; 75 MG/1; MG/1
100 CAPSULE ORAL 2 TIMES DAILY
Qty: 10 CAPSULE | Refills: 0 | Status: SHIPPED | OUTPATIENT
Start: 2024-12-07 | End: 2024-12-12

## 2024-12-07 RX ORDER — DIPHENHYDRAMINE HYDROCHLORIDE 50 MG/ML
25 INJECTION INTRAMUSCULAR; INTRAVENOUS
Status: COMPLETED | OUTPATIENT
Start: 2024-12-07 | End: 2024-12-07

## 2024-12-07 RX ORDER — HYDROXYZINE HYDROCHLORIDE 25 MG/1
25 TABLET, FILM COATED ORAL 3 TIMES DAILY
Qty: 15 TABLET | Refills: 0 | Status: SHIPPED | OUTPATIENT
Start: 2024-12-07 | End: 2024-12-12

## 2024-12-07 RX ORDER — ONDANSETRON 4 MG/1
4 TABLET, ORALLY DISINTEGRATING ORAL EVERY 8 HOURS PRN
Qty: 15 TABLET | Refills: 0 | Status: SHIPPED | OUTPATIENT
Start: 2024-12-07 | End: 2024-12-07

## 2024-12-07 RX ORDER — FAMOTIDINE 10 MG/ML
20 INJECTION INTRAVENOUS
Status: COMPLETED | OUTPATIENT
Start: 2024-12-07 | End: 2024-12-07

## 2024-12-07 RX ADMIN — FAMOTIDINE 20 MG: 10 INJECTION, SOLUTION INTRAVENOUS at 06:12

## 2024-12-07 RX ADMIN — SODIUM CHLORIDE, POTASSIUM CHLORIDE, SODIUM LACTATE AND CALCIUM CHLORIDE 1000 ML: 600; 310; 30; 20 INJECTION, SOLUTION INTRAVENOUS at 07:12

## 2024-12-07 RX ADMIN — DIPHENHYDRAMINE HYDROCHLORIDE 25 MG: 50 INJECTION INTRAMUSCULAR; INTRAVENOUS at 06:12

## 2024-12-08 NOTE — DISCHARGE INSTRUCTIONS
Thank you for coming to our Emergency Department today. It is important to remember that some problems or medical conditions are difficult to diagnose and may not be found or addressed during your Emergency Department visit.  These conditions often start with non-specific symptoms and can only be diagnosed on follow up visits with your primary care physician or specialist when the symptoms continue or change. Please remember that all medical conditions can change, and we cannot predict how you will be feeling tomorrow or the next day. Return to the ER with any questions/concerns, new/concerning symptoms, worsening or failure to improve.       Be sure to follow up with your primary care doctor and review all labs/imaging/tests that were performed during your ER visit with them. It is very common for us to identify non-emergent incidental findings which must be followed up with your primary care physician.  Some labs/imaging/tests may be outside of the normal range, and require non-emergent follow-up and/or further investigation/treatment/procedures/testing to help diagnose/exclude/prevent complications or other potentially serious medical conditions. Some abnormalities may not have been discussed or addressed during your ER visit.     An ER visit does not replace a primary care visit, and many screening tests or follow-up tests cannot be ordered by an ER doctor or performed by the ER. Some tests may even require pre-approval.    If you do not have a primary care doctor, you may contact the one listed on your discharge paperwork or you may also call the [facility] Appointment Desk at [phone] , or [address] [place] [address] at  [phone] to schedule an appointment, or establish care with a primary care doctor or even a specialist and to obtain information about local resources. It is important to your health that you have a primary care doctor.    Please take all medications as directed. We have done our best to select  a medication for you that will treat your condition however, all medications may potentially have side-effects and it is impossible to predict which medications may give you side-effects or what those side-effects (if any) those medications may give you.  If you feel that you are having a negative effect or side-effect of any medication you should stop taking those medications immediately and seek medical attention. If you feel that you are having a life-threatening reaction call 911.        Do not drive, swim, climb to height, take a bath, operate heavy machinery, drink alcohol or take potentially sedating medications, sign any legal documents or make any important decisions for 24 hours if you have received any pain medications, sedatives or mood altering drugs during your ER visit or within 24 hours of taking them if they have been prescribed to you.     You can find additional resources for Dentists, hearing aids, durable medical equipment, low cost pharmacies and other resources at [email or web address]

## 2024-12-08 NOTE — ED PROVIDER NOTES
Encounter Date: 2024       History     Chief Complaint   Patient presents with    Medication Reaction     Patient reports started a new eye drop on Tuesday has been having itching, N/V/D after starting it       Patient is a 65-year-old female with a past medical history of glaucoma, hypertension, diabetes who presents to the emergency department with multiple complaints.  Patient states that she recently started taking acetazolamide and atropine drops 8 days ago.  Since she started taking this medication she has had generalized body itchiness.  She states that she has had difficulty sleeping at night secondary to the itchiness.  Additionally, reports nausea, vomiting and diarrhea which began 4 days ago.  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  She denies fever, chills, weakness, numbness, tingling, chest pain or shortness of breath.        Review of patient's allergies indicates:   Allergen Reactions    Oxycodone-acetaminophen Hives and Itching     Past Medical History:   Diagnosis Date    Cataract     Diabetes mellitus     does not take any meds for same    High cholesterol     Hypertension     Neovascular glaucoma of left eye, moderate stage 2024     Past Surgical History:   Procedure Laterality Date    CATARACT EXTRACTION W/  INTRAOCULAR LENS IMPLANT Right 2021    Dr. eFrreira     SECTION      CHOLECYSTECTOMY      COLONOSCOPY N/A 2023    Procedure: COLONOSCOPY;  Surgeon: Ramón Upton MD;  Location: [facility] ENDOSCOPY;  Service: Endoscopy;  Laterality: N/A;  WLMeds / Referral: PEG AMBRIZ / COLTEN /  Instructions portal - LW  - instructions to email ([email or web address]), pre call complete.  DBM    ESOPHAGOGASTRODUODENOSCOPY N/A 2023    Procedure: EGD (ESOPHAGOGASTRODUODENOSCOPY);  Surgeon: Ramón Upton MD;  Location: [facility] ENDOSCOPY;  Service: Endoscopy;  Laterality: N/A;    EXTRACTION OF CATARACT Left 10/14/2021     Procedure: EXTRACTION, CATARACT;  Surgeon: Mina Ferreira MD;  Location: [facility] OR;  Service: Ophthalmology;  Laterality: Left;  RN Phone Pre Op. 10-6-21. Covid NEGATIVE 10-13-21. Arrival 06:00 am.  [place]    EYE SURGERY Right     catartact    HYSTERECTOMY      Age 53, Fibroids    INTRAOCULAR PROSTHESES INSERTION Right 9/23/2021    Procedure: INSERTION, IOL PROSTHESIS;  Surgeon: Mina Ferreira MD;  Location: [facility] OR;  Service: Ophthalmology;  Laterality: Right;    INTRAOCULAR PROSTHESES INSERTION Left 10/14/2021    Procedure: INSERTION, IOL PROSTHESIS;  Surgeon: Mina Ferreira MD;  Location: [facility] OR;  Service: Ophthalmology;  Laterality: Left;    PHACOEMULSIFICATION OF CATARACT Right 9/23/2021    Procedure: PHACOEMULSIFICATION, CATARACT;  Surgeon: Mina Ferreira MD;  Location: [facility] OR;  Service: Ophthalmology;  Laterality: Right;  RN Phone Pre Op 9-20-21. Covid NEGATIVE ON  9-22-21..  Arrival 05:30 am.  C A     Family History   Problem Relation Name Age of Onset    Coronary artery disease Mother      Diabetes Mother      Hypertension Mother      Cataracts Mother      Stroke Father      Hypertension Father      Prostate cancer Father      Cataracts Father      Lung cancer Brother      Stomach cancer Brother      Glaucoma Daughter      Glaucoma Daughter      Amblyopia Neg Hx      Blindness Neg Hx      Macular degeneration Neg Hx      Retinal detachment Neg Hx      Strabismus Neg Hx       Social History     Tobacco Use    Smoking status: Never    Smokeless tobacco: Never   Substance Use Topics    Alcohol use: Yes     Comment: socially    Drug use: Never     Review of Systems   Constitutional:  Negative for chills and fever.   Respiratory:  Negative for chest tightness and shortness of breath.    Cardiovascular:  Negative for chest pain and leg swelling.   Gastrointestinal:  Positive for abdominal pain, diarrhea, nausea and vomiting. Negative for abdominal distention and blood in stool.    Genitourinary:  Negative for dysuria, frequency and urgency.   Skin:  Positive for rash.   Neurological:  Negative for dizziness and weakness.       Physical Exam     Initial Vitals [12/07/24 1727]   BP Pulse Resp Temp SpO2   137/62 69 18 98.4 °F (36.9 °C) 100 %      MAP       --         Physical Exam    Nursing note and vitals reviewed.  Constitutional: She appears well-developed and well-nourished. She is not diaphoretic. No distress.   HENT:   Head: Normocephalic and atraumatic.   Right Ear: External ear normal.   Left Ear: External ear normal.   Eyes: Conjunctivae and EOM are normal.   Right intra-ocular pressure 32, left intra-ocular pressure 44   Neck: No tracheal deviation present. No JVD present.   Normal range of motion.  Cardiovascular:  Normal rate.           Pulmonary/Chest: No stridor. No respiratory distress.   Abdominal: She exhibits no distension. There is abdominal tenderness (Epigastric). There is no rebound and no guarding.   Musculoskeletal:      Cervical back: Normal range of motion.     Neurological: She is alert and oriented to person, place, and time.   Skin: No rash noted. No erythema.   Excoriations noted to abdomen, bilateral lower extremities and dorsal aspect of bilateral feet  No rash noted   Psychiatric: She has a normal mood and affect.         ED Course   Procedures  Labs Reviewed   URINALYSIS, REFLEX TO URINE CULTURE - Abnormal       Result Value    Specimen UA Urine, Clean Catch      Color, UA Yellow      Appearance, UA Hazy (*)     pH, UA 7.0      Specific Gravity, UA 1.030      Protein, UA Trace (*)     Glucose, UA Negative      Ketones, UA Negative      Bilirubin (UA) Negative      Occult Blood UA Negative      Nitrite, UA Negative      Urobilinogen, UA 4.0-6.0 (*)     Leukocytes, UA 3+ (*)     Narrative:     Specimen Source->Urine   CBC W/ AUTO DIFFERENTIAL - Abnormal    WBC 6.25      RBC 3.60 (*)     Hemoglobin 11.4 (*)     Hematocrit 32.2 (*)     MCV 89      MCH 31.7 (*)      MCHC 35.4      RDW 13.0      Platelets 252      MPV 10.5      Immature Granulocytes 0.2      Gran # (ANC) 3.2      Immature Grans (Abs) 0.01      Lymph # 2.2      Mono # 0.4      Eos # 0.4      Baso # 0.05      nRBC 0      Gran % 51.4      Lymph % 34.6      Mono % 6.9      Eosinophil % 6.1      Basophil % 0.8      Differential Method Automated     COMPREHENSIVE METABOLIC PANEL - Abnormal    Sodium 144      Potassium 3.0 (*)     Chloride 117 (*)     CO2 18 (*)     Glucose 74      BUN 21      Creatinine 1.1      Calcium 9.6      Total Protein 7.9      Albumin 4.4      Total Bilirubin 0.5      Alkaline Phosphatase 84      AST 18      ALT 36      eGFR 57 (*)     Anion Gap 9     LIPASE - Abnormal    Lipase 63 (*)    URINALYSIS MICROSCOPIC - Abnormal    RBC, UA 6 (*)     WBC, UA 91 (*)     WBC Clumps, UA Occasional (*)     Bacteria Many (*)     Squam Epithel, UA 1      Hyaline Casts, UA 4 (*)     Microscopic Comment SEE COMMENT      Narrative:     Specimen Source->Urine   CULTURE, URINE   SARS-COV-2 RDRP GENE    POC Rapid COVID Negative       Acceptable Yes     POCT INFLUENZA A/B MOLECULAR    POC Molecular Influenza A Ag Negative      POC Molecular Influenza B Ag Negative       Acceptable Yes     POCT GLUCOSE    POCT Glucose 75     POCT GLUCOSE MONITORING CONTINUOUS          Imaging Results    None          Medications   diphenhydrAMINE injection 25 mg (25 mg Intravenous Given 12/7/24 1836)   famotidine (PF) injection 20 mg (20 mg Intravenous Given 12/7/24 1836)   lactated ringers bolus 1,000 mL (0 mLs Intravenous Stopped 12/7/24 2036)     Medical Decision Making  This is an emergent evaluation of a 64 y.o. female presenting to the ED for urinary symptoms. Afebrile. Patient is non-toxic appearing and in no acute distress. Presentation consistent with acute cystitis. No clinical evidence to suggest acute pyelonephritis at this time. No urosepsis. Lower suspicion for infected renal stone at  this time. Does not endorse Hx or risk factors concerning for pelvic etiology at this time, including for PID and risk of TOA. I carefully consider, but doubt acute appendicitis. Sent home with supportive care and antibiotics while culture is pending. Advising PCP follow up. Strict return precautions discussed. Agreeable to plan.     Additionally, suspect that these patients generalized itchiness is secondary to a drug reaction from recently starting acetazolamide.  Patient stated that she would like to stop this medication, discussed with on-call Ophthalmology, due to patient's elevated intra-ocular pressures they would recommend against stopping the medication at this time.  Discussed with patient who is agreeable to continuing the medication, we will prescribe a prescription for Atarax to help with the itching.    I discussed with the patient the diagnosis, treatment plan, indications for return to the emergency department, and for expected follow-up. The patient verbalized an understanding. The patient is asked if there are any questions or concerns. We discuss the case, until all issues are addressed to the patient's satisfaction. Patient understands and is agreeable to the plan.  No additional eye complaints at this time, patient is intra-ocular pressure similar to prior ophthalmology appointments on chart review.    Amount and/or Complexity of Data Reviewed  Labs: ordered.    Risk  Prescription drug management.               ED Course as of 12/07/24 2233   Sat Dec 07, 2024   2034 Believe that the patient's itchiness is secondary to a drug reaction to the Diamox.  Patient is intra-ocular pressures remain elevated, we will discussed with on-call Ophthalmology prior to discontinuing the Diamox. [SL]      ED Course User Index  [SL] Ana Mccarty PA-C                           Clinical Impression:  Final diagnoses:  [N30.00] Acute cystitis without hematuria (Primary)  [K85.90] Acute pancreatitis, unspecified  complication status, unspecified pancreatitis type  [T50.900L] Adverse effect of drug, initial encounter  [H40.2180] Chronic glaucoma          ED Disposition Condition    Discharge           ED Prescriptions       Medication Sig Dispense Start Date End Date Auth. Provider    nitrofurantoin, macrocrystal-monohydrate, (MACROBID) 100 MG capsule  (Status: Discontinued) Take 1 capsule (100 mg total) by mouth 2 (two) times daily. for 5 days 10 capsule 12/7/2024 12/7/2024 Ana Mccarty PA-C    ondansetron (ZOFRAN-ODT) 4 MG TbDL  (Status: Discontinued) Take 1 tablet (4 mg total) by mouth every 8 (eight) hours as needed. 15 tablet 12/7/2024 12/7/2024 Ana Mccarty PA-C    nitrofurantoin, macrocrystal-monohydrate, (MACROBID) 100 MG capsule Take 1 capsule (100 mg total) by mouth 2 (two) times daily. for 5 days 10 capsule 12/7/2024 12/12/2024 Ana Mccarty PA-C    ondansetron (ZOFRAN-ODT) 4 MG TbDL Take 1 tablet (4 mg total) by mouth every 8 (eight) hours as needed. 15 tablet 12/7/2024 12/12/2024 Ana Mccarty PA-C    hydrOXYzine HCL (ATARAX) 25 MG tablet Take 1 tablet (25 mg total) by mouth 3 (three) times daily. for 5 days 15 tablet 12/7/2024 12/12/2024 Ana Mccarty PA-C    famotidine (PEPCID) 20 MG tablet Take 1 tablet (20 mg total) by mouth 1 (one) time if needed (itching). 5 tablet 12/7/2024 -- Ana Mccarty PA-C          Follow-up Information       Follow up With Specialties Details Why Contact Info    [facility] - Emergency Dept Emergency Medicine Go to  for new or worsening symptoms [address] [facility] [place] [address] [phone]    Diaz Yip MD Internal Medicine   [address] [facility]  [place] [address]  [phone]               Ana Mccarty PA-C  12/07/24 1313

## 2024-12-09 LAB — BACTERIA UR CULT: ABNORMAL

## 2024-12-10 ENCOUNTER — E-VISIT (OUTPATIENT)
Dept: FAMILY MEDICINE | Facility: CLINIC | Age: 64
End: 2024-12-10

## 2024-12-10 DIAGNOSIS — E11.9 TYPE 2 DIABETES MELLITUS WITHOUT COMPLICATION, WITHOUT LONG-TERM CURRENT USE OF INSULIN: ICD-10-CM

## 2024-12-10 DIAGNOSIS — H40.52X2 NEOVASCULAR GLAUCOMA OF LEFT EYE, MODERATE STAGE: ICD-10-CM

## 2024-12-10 DIAGNOSIS — B96.20 E. COLI UTI: ICD-10-CM

## 2024-12-10 DIAGNOSIS — L29.9 ITCHING: ICD-10-CM

## 2024-12-10 DIAGNOSIS — N39.0 E. COLI UTI: ICD-10-CM

## 2024-12-10 DIAGNOSIS — R11.0 NAUSEA: Primary | ICD-10-CM

## 2024-12-11 RX ORDER — SEMAGLUTIDE 0.68 MG/ML
0.5 INJECTION, SOLUTION SUBCUTANEOUS
Qty: 4 EACH | Refills: 3 | Status: SHIPPED | OUTPATIENT
Start: 2024-12-11

## 2024-12-11 NOTE — PROGRESS NOTES
Patient ID: Torsten Trevizo is a 64 y.o. female.    This note was created by combination of typed  and M-Modal dictation.  Transcription errors may be present.  If there are any questions, please contact me.    Assessment and Plan:   1. Nausea  3. Neovascular glaucoma of left eye, moderate stage  2. Itching  4. Type 2 diabetes mellitus without complication, without long-term current use of insulin hx metformin with GI SE; saxagliptin expensive  Suspect the tingling and numbness is a side effect of the acetazolamide.  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  She has had issues with nausea even preceding the Ozempic but it could be worsening it.  She would be agreeable for trial of lower dose.  Decrease Ozempic from 1 mg down to 0.5 mg  - semaglutide (OZEMPIC) 0.25 mg or 0.5 mg (2 mg/3 mL) pen injector; Inject 0.5 mg into the skin every 7 days. Decreased dose  Dispense: 4 each; Refill: 3    5. E. coli UTI  -on appropriate antibiotic per ED discharge        No orders of the defined types were placed in this encounter.           No follow-ups on file. or sooner as needed.        E-Visit Time Tracking:    Day 1, 21 minutes               274}    Follow Up: No follow-ups on file.    Subjective:   Patient ID: Torsten Trevizo is a 64 y.o. female.    Chief Complaint: Follow-up (Entered automatically based on patient selection in [employer].)                    274}    History of Present Illness:  The patient initiated a request through [employer] on 12/10/2024 for evaluation and management with a chief complaint of Follow-up (Entered automatically based on patient selection in [employer].)       Diabetes.  Trulicity possible side effect of fatigue.  Constipation which preceded Trulicity.  Offered to change, she ultimately decided to stay on Trulicity.  Want her to reestablish with digital  monitoring.  Hypertension, reestablish with digital monitoring. with 40 lb weight loss I suspect her sensation of dizziness and lightheadedness are from low BP.  Stop HCTZ.  Stay on lisinopril 20.  Hyperlipidemia statin.  Hypertension    Anemia subsequent diagnosis of H pylori  12/19/23 colonoscopy normal repeat 10 years  12/19/23 EGD normal stomach, duodenum.  Path showing Brunner's gland hyperplasia.  H pylori positive  Saw GI 3/19/24. Plan is stool h pylori  Saw ophtho 4/24/24. Central retinal vein occlusion L eye  3/11/24 CBC mild anemia  9/2023 normal ferritin     Last visit with me 07/15/2024   Fatigue worsen starting Trulicity.  More recently, anemia with diagnosed with H pylori.  Check labs.  Subsequent stool H pylori negative.  Trial of Ozempic instead of Trulicity  Atypical chest pain referred to Cardiology  Diabetes, overweight, increasing appetite.  Trial of Ozempic instead of Trulicity     CMP potassium low normal normal creatinine  CBC normal hemoglobin previously anemic subsequently treated for H pylori   Retic count normal/appropriate   Ferritin normal  A1c 5.9 on Trulicity at time of labs  Done for complaints of fatigue.    Trial of switching Trulicity to Ozempic to see if that is the cause of fatigue      ED for increased intra-ocular pressure.  Status post panretinal photocoagulation 06/11/2024  Discharged on Diamox     Pre visit labs   CBC normal   CMP normal   Lipid profile  from previous 112  A1c 5.5 from 5.9    Last visit me 10/16/2024   Acute low back pain trial of ibuprofen with Flexeril  Diabetes pre visit labs A1c stable on Ozempic 1 mg.  Nausea which is relieved with Zofran  Hyperlipidemia increase atorvastatin to 80  Chronic constipation preceding GLP1.  Has tried a number stool softeners without lasting relief.  Taking MiraLax regularly.  Next would be Linzess.  Up-to-date colonoscopy, negative  Dyspnea with upcoming cardiology consult  Snoring fatigue ordered HST    11/25/2024  ED for eye pain treated for acute angle closure glaucoma, intra-ocular injections given in the ED    12/7 ED visit.  Complaining of generalized itching, no visible rash on documented exam.  Seems to have coincided with starting acetazolamide and atropine drops.  Labs CMP CO2 low expected.  Potassium low.  Creatinine 1.1 from 0.8  CBC mild anemia  Urinalysis trace protein leukocytes positive microscopic with pyuria  Urine culture growing out E coli sensitive to Macrobid which was given on discharge    I evaluated the questionnaire submission on 12/11/2024 .    E-visit Questionnaire:  Ohs Peq Evisit Ed Follow Up    12/10/2024 12:33 PM CST - Filed by Patient   Do you agree to participate in an E-Visit? Yes   If you have any of the following symptoms, please present to your local emergency room or call 911:  I acknowledge   What problem(s) was addressed at your recent emergency room visit? Eye pain, tiredness, nausea, uti, acute pancreatitis, really bad itching   How is your problem since your Emergency Room visit? Same   Were you given any new medication(s)? Yes   Have you picked up your new medication from the pharmacy? Yes   Did the emergency room provider ask you to schedule any labs or testing after discharge? No   Provide any additional information you feel is important. The itching, nausea, tiredness   Please attach any relevant images or files    Are you able to take your vital signs? 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  Path  showing Brunner's gland hyperplasia.  H pylori positive      Screening for colon cancer 12/19/23 colonoscopy normal repeat 10 years 06/01/2020 11/21/2016 colonoscopy normal repeat 10 years.  12/19/23 colonoscopy normal repeat 10 years      Primary insomnia hx of melatonin without relief; hx of trazodone 03/13/2020    GERD (gastroesophageal reflux disease) 01/29/2016    Dyslipidemia hx of crestor, lipitor 10/19/2015    Headache chronic with hx of topamax hx of propranolol 10/19/2015    Essential hypertension hx of hctz, amlodipine, lisinopril, propranolol 11/20/2014    Type 2 diabetes mellitus without complication, without long-term current use of insulin hx metformin with GI SE; saxagliptin expensive 11/20/2014        Recent Labs Obtained:  Admission on 12/07/2024, Discharged on 12/07/2024   Component Date Value Ref Range Status    POC Rapid COVID 12/07/2024 Negative  Negative Final     Acceptable 12/07/2024 Yes   Final    POC Molecular Influenza A Ag 12/07/2024 Negative  Negative Final    POC Molecular Influenza B Ag 12/07/2024 Negative  Negative Final     Acceptable 12/07/2024 Yes   Final    Specimen UA 12/07/2024 Urine, Clean Catch   Final    Color, UA 12/07/2024 Yellow  Yellow, Straw, Naz Final    Appearance, UA 12/07/2024 Hazy (A)  Clear Final    pH, UA 12/07/2024 7.0  5.0 - 8.0 Final    Specific Gravity, UA 12/07/2024 1.030  1.005 - 1.030 Final    Protein, UA 12/07/2024 Trace (A)  Negative Final    Comment: Recommend a 24 hour urine protein or a urine   protein/creatinine ratio if globulin induced proteinuria is  clinically suspected.      Glucose, UA 12/07/2024 Negative  Negative Final    Ketones, UA 12/07/2024 Negative  Negative Final    Bilirubin (UA) 12/07/2024 Negative  Negative Final    Occult Blood UA 12/07/2024 Negative  Negative Final    Nitrite, UA 12/07/2024 Negative  Negative Final    Urobilinogen, UA 12/07/2024 4.0-6.0 (A)  <2.0 EU/dL Final    Leukocytes, UA  12/07/2024 3+ (A)  Negative Final    WBC 12/07/2024 6.25  3.90 - 12.70 K/uL Final    RBC 12/07/2024 3.60 (L)  4.00 - 5.40 M/uL Final    Hemoglobin 12/07/2024 11.4 (L)  12.0 - 16.0 g/dL Final    Hematocrit 12/07/2024 32.2 (L)  37.0 - 48.5 % Final    MCV 12/07/2024 89  82 - 98 fL Final    MCH 12/07/2024 31.7 (H)  27.0 - 31.0 pg Final    MCHC 12/07/2024 35.4  32.0 - 36.0 g/dL Final    RDW 12/07/2024 13.0  11.5 - 14.5 % Final    Platelets 12/07/2024 252  150 - 450 K/uL Final    MPV 12/07/2024 10.5  9.2 - 12.9 fL Final    Immature Granulocytes 12/07/2024 0.2  0.0 - 0.5 % Final    Gran # (ANC) 12/07/2024 3.2  1.8 - 7.7 K/uL Final    Immature Grans (Abs) 12/07/2024 0.01  0.00 - 0.04 K/uL Final    Comment: Mild elevation in immature granulocytes is non specific and   can be seen in a variety of conditions including stress response,   acute inflammation, trauma and pregnancy. Correlation with other   laboratory and clinical findings is essential.      Lymph # 12/07/2024 2.2  1.0 - 4.8 K/uL Final    Mono # 12/07/2024 0.4  0.3 - 1.0 K/uL Final    Eos # 12/07/2024 0.4  0.0 - 0.5 K/uL Final    Baso # 12/07/2024 0.05  0.00 - 0.20 K/uL Final    nRBC 12/07/2024 0  0 /100 WBC Final    Gran % 12/07/2024 51.4  38.0 - 73.0 % Final    Lymph % 12/07/2024 34.6  18.0 - 48.0 % Final    Mono % 12/07/2024 6.9  4.0 - 15.0 % Final    Eosinophil % 12/07/2024 6.1  0.0 - 8.0 % Final    Basophil % 12/07/2024 0.8  0.0 - 1.9 % Final    Differential Method 12/07/2024 Automated   Final    Sodium 12/07/2024 144  136 - 145 mmol/L Final    Potassium 12/07/2024 3.0 (L)  3.5 - 5.1 mmol/L Final    Chloride 12/07/2024 117 (H)  95 - 110 mmol/L Final    CO2 12/07/2024 18 (L)  23 - 29 mmol/L Final    Glucose 12/07/2024 74  70 - 110 mg/dL Final    BUN 12/07/2024 21  8 - 23 mg/dL Final    Creatinine 12/07/2024 1.1  0.5 - 1.4 mg/dL Final    Calcium 12/07/2024 9.6  8.7 - 10.5 mg/dL Final    Total Protein 12/07/2024 7.9  6.0 - 8.4 g/dL Final    Albumin 12/07/2024 4.4   3.5 - 5.2 g/dL Final    Total Bilirubin 12/07/2024 0.5  0.1 - 1.0 mg/dL Final    Comment: For infants and newborns, interpretation of results should be based  on gestational age, weight and in agreement with clinical  observations.    Premature Infant recommended reference ranges:  Up to 24 hours.............<8.0 mg/dL  Up to 48 hours............<12.0 mg/dL  3-5 days..................<15.0 mg/dL  6-29 days.................<15.0 mg/dL      Alkaline Phosphatase 12/07/2024 84  40 - 150 U/L Final    AST 12/07/2024 18  10 - 40 U/L Final    ALT 12/07/2024 36  10 - 44 U/L Final    eGFR 12/07/2024 57 (A)  >60 mL/min/1.73 m^2 Final    Anion Gap 12/07/2024 9  8 - 16 mmol/L Final    Lipase 12/07/2024 63 (H)  4 - 60 U/L Final    RBC, UA 12/07/2024 6 (H)  0 - 4 /hpf Final    WBC, UA 12/07/2024 91 (H)  0 - 5 /hpf Final    WBC Clumps, UA 12/07/2024 Occasional (A)  None-Rare Final    Bacteria 12/07/2024 Many (A)  None-Occ /hpf Final    Squam Epithel, UA 12/07/2024 1  /hpf Final    Hyaline Casts, UA 12/07/2024 4 (A)  0-1/lpf /lpf Final    Microscopic Comment 12/07/2024 SEE COMMENT   Final    Comment: Other formed elements not mentioned in the report are not   present in the microscopic examination.       Urine Culture, Routine 12/07/2024  (A)   Final                    Value:ESCHERICHIA COLI  >100,000 cfu/ml      POCT Glucose 12/07/2024 75  70 - 110 mg/dL Final         ALLERGIES AND MEDICATIONS: updated and reviewed.  Review of patient's allergies indicates:   Allergen Reactions    Oxycodone-acetaminophen Hives and Itching       Medication List with Changes/Refills   Current Medications    ACETAZOLAMIDE (DIAMOX) 500 MG CPSR    Take 1 capsule (500 mg total) by mouth 2 (two) times daily.    ATORVASTATIN (LIPITOR) 80 MG TABLET    Take 1 tablet (80 mg total) by mouth once daily.    ATROPINE 1% (ISOPTO ATROPINE) 1 % DROP    Place 1 drop into the left eye 2 (two) times a day.    BLOOD-GLUCOSE METER KIT    To check BG 1 times daily, to  use with insurance preferred meter    BRIMONIDINE-TIMOLOL (COMBIGAN) 0.2-0.5 % DROP    Place 1 drop into the left eye 3 (three) times daily.    CETIRIZINE (ZYRTEC) 10 MG CAP    Take 10 mg by mouth Daily.    CICLOPIROX (PENLAC) 8 % SOLN    Apply topically nightly.    DORZOLAMIDE-TIMOLOL, PF, (COSOPT, PF,) 2-0.5 % DPET OPHTHALMIC SOLUTION    Place 1 drop into the left eye 2 (two) times daily.    FAMOTIDINE (PEPCID) 20 MG TABLET    Take 1 tablet (20 mg total) by mouth 1 (one) time if needed (itching).    FLUTICASONE PROPIONATE (FLONASE) 50 MCG/ACTUATION NASAL SPRAY    1 spray (50 mcg total) by Each Nostril route once daily.    HYDROXYZINE HCL (ATARAX) 25 MG TABLET    Take 1 tablet (25 mg total) by mouth 3 (three) times daily. for 5 days    LISINOPRIL (PRINIVIL,ZESTRIL) 20 MG TABLET    Take 1 tablet (20 mg total) by mouth once daily.    MECLIZINE (ANTIVERT) 25 MG TABLET    Take 1 tablet (25 mg total) by mouth 3 (three) times daily as needed for Dizziness.    NITROFURANTOIN, MACROCRYSTAL-MONOHYDRATE, (MACROBID) 100 MG CAPSULE    Take 1 capsule (100 mg total) by mouth 2 (two) times daily. for 5 days    ONDANSETRON (ZOFRAN-ODT) 4 MG TBDL    Take 1 tablet (4 mg total) by mouth every 8 (eight) hours as needed.    ONDANSETRON (ZOFRAN-ODT) 8 MG TBDL    Take 1 tablet (8 mg total) by mouth every 8 (eight) hours as needed.    PREDNISOLONE ACETATE (PRED FORTE) 1 % DRPS    Place 1 drop into the left eye 4 (four) times daily.    SEMAGLUTIDE (OZEMPIC) 1 MG/DOSE (2 MG/1.5 ML) PNIJ    Inject 1 mg into the skin every 7 days. Month 2 onward

## 2024-12-18 ENCOUNTER — OFFICE VISIT (OUTPATIENT)
Dept: OPHTHALMOLOGY | Facility: CLINIC | Age: 64
End: 2024-12-18
Attending: OPHTHALMOLOGY

## 2024-12-18 DIAGNOSIS — H40.52X2 NEOVASCULAR GLAUCOMA OF LEFT EYE, MODERATE STAGE: ICD-10-CM

## 2024-12-18 DIAGNOSIS — H21.02 HYPHEMA, LEFT EYE: Primary | ICD-10-CM

## 2024-12-18 DIAGNOSIS — H34.8120 CENTRAL RETINAL VEIN OCCLUSION WITH MACULAR EDEMA OF LEFT EYE: ICD-10-CM

## 2024-12-18 PROCEDURE — 99213 OFFICE O/P EST LOW 20 MIN: CPT | Mod: PBBFAC,PO | Performed by: OPHTHALMOLOGY

## 2024-12-18 PROCEDURE — 99999 PR PBB SHADOW E&M-EST. PATIENT-LVL III: CPT | Mod: PBBFAC,,, | Performed by: OPHTHALMOLOGY

## 2024-12-18 PROCEDURE — 92134 CPTRZ OPH DX IMG PST SGM RTA: CPT | Mod: PBBFAC,PO | Performed by: OPHTHALMOLOGY

## 2024-12-18 NOTE — PROGRESS NOTES
Subjective:       Patient ID: Torsten Trevizo is a 64 y.o. female      Chief Complaint   Patient presents with    Follow-up     Referred back by Dr Castro for hyphema, NVG     History of Present Illness  HPI     Follow-up     Additional comments: Referred back by Dr Castro for hyphema, NVG           Comments    Oct/Dfe     Lost to f/u.  Did not stick to prior treatment plan.  Has seen Ita Escobar and Timothy through ED/urgent Rx    Pt states her vision has been ok, She states the Diamox makes her itch   severely. She states she is worried about this and has been taking   benadryl daily. The other Diamox bottle she has does not make her itch.    No eye pain    Cosopt 0/2  Brim 0/2  PF 0/2  Atropine 0/1   Diamox 500 mg BID           Last edited by Mushtaq Hidalgo MD on 12/20/2024  5:16 PM.        Imaging:    See report    Assessment/Plan:     1. Central retinal vein occlusion with macular edema of left eye  2. Neovascular glaucoma of left eye, moderate stage  3. Hyphema, left eye (Primary)  Lost to f/u  Visual potential unknown  3 wks s/p Av with Dr Aguirre  Recommend Av in 1 wk  Will need extensive PRP to try to back off injections  CPM with meds as above.  Can change Diamox to other bottle that did not seem to give a rash.  Can hold pills if rash not resolved with change of medication form  Refer to glaucoma for consideration of glaucoma procedure   - Posterior Segment OCT Retina-Both eyes    Follow up in about 1 week (around 12/25/2024), or if symptoms worsen or fail to improve, for Injection Left eye, Avastin.

## 2024-12-19 ENCOUNTER — TELEPHONE (OUTPATIENT)
Dept: OPHTHALMOLOGY | Facility: CLINIC | Age: 64
End: 2024-12-19

## 2024-12-19 NOTE — TELEPHONE ENCOUNTER
----- Message from Tech Jessy sent at 12/18/2024  4:24 PM CST -----  Regarding: FW: paul    ----- Message -----  From: Cj Rose OA  Sent: 12/18/2024   4:11 PM CST  To: Jessy Grace  Subject: FW: paul                                            ----- Message -----  From: Pavithra Oliver  Sent: 12/18/2024  10:15 AM CST  To: [facility] Staff  Subject: paul                                              Per Dr. Hidalgo pt needs to be seen by glaucoma for surgery. Advised pt that someone from clinic would give her a call.    Thank you  
(2) Some effort against gravity; leg falls to bed by 5 secs, but has some effort against gravity

## 2024-12-28 ENCOUNTER — E-VISIT (OUTPATIENT)
Dept: FAMILY MEDICINE | Facility: CLINIC | Age: 64
End: 2024-12-28

## 2024-12-28 DIAGNOSIS — E11.9 TYPE 2 DIABETES MELLITUS WITHOUT COMPLICATION, WITHOUT LONG-TERM CURRENT USE OF INSULIN: Primary | ICD-10-CM

## 2024-12-29 RX ORDER — LANCETS
EACH MISCELLANEOUS
Qty: 100 EACH | Refills: 4 | Status: SHIPPED | OUTPATIENT
Start: 2024-12-29

## 2024-12-30 NOTE — PROGRESS NOTES
Patient ID: Torsten Trevizo is a 64 y.o. female.    This note was created by combination of typed  and M-Modal dictation.  Transcription errors may be present.  If there are any questions, please contact me.    Assessment and Plan:   1. Type 2 diabetes mellitus without complication, without long-term lost insurance, GLP1 expensive  History of metformin with GI SE  Last A1c good  Would stop meds monitor glucose and if climibing start glipizide xl 5  - lancets Misc; To check BG 1 times daily, to use with insurance preferred meter  Dispense: 100 each; Refill: 4  - blood sugar diagnostic Strp; To check BG 1 times daily, to use with insurance preferred meter  Dispense: 100 strip; Refill: 3        No orders of the defined types were placed in this encounter.     Medications Ordered This Encounter   Medications    blood sugar diagnostic Strp     Sig: To check BG 1 times daily, to use with insurance preferred meter     Dispense:  100 strip     Refill:  3    lancets Misc     Sig: To check BG 1 times daily, to use with insurance preferred meter     Dispense:  100 each     Refill:  4        No follow-ups on file. or sooner as needed.        E-Visit Time Tracking:    Day 1, 11 min               274}    Follow Up: No follow-ups on file.    Subjective:   Patient ID: Torsten Trevizo is a 64 y.o. female.    Chief Complaint: General Illness (Entered automatically based on patient selection in [email or web address].)                    274}    History of Present Illness:  The patient initiated a request through [email or web address] on 12/28/2024 for evaluation and management with a chief complaint of General Illness (Entered automatically based on patient selection in [email or web address].)     Diabetes.  Trulicity possible side effect of fatigue.  Constipation which preceded Trulicity.  Offered to change, she ultimately decided to stay on Trulicity.  Want her to reestablish with digital monitoring.  Hypertension, reestablish with digital monitoring. with 40 lb  weight loss I suspect her sensation of dizziness and lightheadedness are from low BP.  Stop HCTZ.  Stay on lisinopril 20.  Hyperlipidemia statin.  Hypertension    Anemia subsequent diagnosis of H pylori  12/19/23 colonoscopy normal repeat 10 years  12/19/23 EGD normal stomach, duodenum.  Path showing Brunner's gland hyperplasia.  H pylori positive  Saw GI 3/19/24. Plan is stool h pylori  Saw ophtho 4/24/24. Central retinal vein occlusion L eye  3/11/24 CBC mild anemia  9/2023 normal ferritin     Last visit with me 07/15/2024   Fatigue worsen starting Trulicity.  More recently, anemia with diagnosed with H pylori.  Check labs.  Subsequent stool H pylori negative.  Trial of Ozempic instead of Trulicity  Atypical chest pain referred to Cardiology  Diabetes, overweight, increasing appetite.  Trial of Ozempic instead of Trulicity     CMP potassium low normal normal creatinine  CBC normal hemoglobin previously anemic subsequently treated for H pylori   Retic count normal/appropriate   Ferritin normal  A1c 5.9 on Trulicity at time of labs  Done for complaints of fatigue.    Trial of switching Trulicity to Ozempic to see if that is the cause of fatigue      ED for increased intra-ocular pressure.  Status post panretinal photocoagulation 06/11/2024  Discharged on Diamox     Pre visit labs   CBC normal   CMP normal   Lipid profile  from previous 112  A1c 5.5 from 5.9     Last visit me 10/16/2024   Acute low back pain trial of ibuprofen with Flexeril  Diabetes pre visit labs A1c stable on Ozempic 1 mg.  Nausea which is relieved with Zofran  Hyperlipidemia increase atorvastatin to 80  Chronic constipation preceding GLP1.  Has tried a number stool softeners without lasting relief.  Taking MiraLax regularly.  Next would be Linzess.  Up-to-date colonoscopy, negative  Dyspnea with upcoming cardiology consult  Snoring fatigue ordered HST     11/25/2024 ED for eye pain treated for acute angle closure glaucoma, intra-ocular  injections given in the ED     12/7 ED visit.  Complaining of generalized itching, no visible rash on documented exam.  Seems to have coincided with starting acetazolamide and atropine drops.  Labs CMP CO2 low expected.  Potassium low.  Creatinine 1.1 from 0.8  CBC mild anemia  Urinalysis trace protein leukocytes positive microscopic with pyuria  Urine culture growing out E coli sensitive to Macrobid which was given on discharge    12/10/24 e visit for nausea. Tingling and numbness.  Suspect the tingling and numbness is a side effect of the acetazolamide.  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  She has had issues with nausea even preceding the Ozempic but it could be worsening it.  She would be agreeable for trial of lower dose.  Decrease Ozempic from 1 mg down to 0.5 mg    Evisit, sounds like she lost insurance and canot afford ozempic.  History of metformin with GI SE  Last A1c good  Would stop meds monitor glucose and if climibing start glipizide xl 5      I evaluated the questionnaire submission on 12/29/2024 .    E-visit Questionnaire:  [facility] P[facility] Evisit [facility]-Medication    12/28/2024  3:12 PM CST - Filed by Patient   What do you need help with? Other Concern   Do you agree to participate in an E-Visit? Yes   If you have any of the following symptoms, please present to your local emergency room or call 911:  I acknowledge   What is the main issue you would like addressed today? Dr Yip my insurance has canceled & the ozympec is very expensive $1163.00 trying to see if there is something a lil cheaper i can take until i get insurance or should i just purchase this   Please describe your symptoms Same as b4   Where is your problem located? Inner body   How severe are your symptoms? Severe   Have you had these symptoms before? Yes   How long have you been having these symptoms? For  more than a month   Please list any medications or treatments you have used for your condition and indicate if it was effective or not.    What makes this feel better? Sleeping   What makes this feel worse? Eating   Are these symptoms related to a condition that you currently have? Yes   What is the condition?    When were you last seen for this condition?    Please describe any probable cause for these symptoms Stated in previous posts   Provide any additional information you feel is important.    Please attach any relevant images or files    Are you able to take your vital signs? 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  Path showing Brunner's gland hyperplasia.  H pylori positive      Screening for colon cancer 12/19/23 colonoscopy normal repeat 10 years 06/01/2020 11/21/2016 colonoscopy normal repeat 10 years.  12/19/23 colonoscopy normal repeat 10 years      Primary insomnia hx of melatonin without relief; hx of trazodone 03/13/2020    GERD (gastroesophageal reflux disease) 01/29/2016    Dyslipidemia hx of crestor, lipitor 10/19/2015    Headache chronic with hx of topamax hx of propranolol 10/19/2015    Essential hypertension hx of hctz, amlodipine, lisinopril, propranolol 11/20/2014    Type 2 diabetes mellitus without complication, without long-term current use of insulin hx metformin with GI SE; saxagliptin expensive 11/20/2014        Recent Labs Obtained:  No visits with results within 7 Day(s) from this visit.   Latest known  visit with results is:   Admission on 12/07/2024, Discharged on 12/07/2024   Component Date Value Ref Range Status    POC Rapid COVID 12/07/2024 Negative  Negative Final     Acceptable 12/07/2024 Yes   Final    POC Molecular Influenza A Ag 12/07/2024 Negative  Negative Final    POC Molecular Influenza B Ag 12/07/2024 Negative  Negative Final     Acceptable 12/07/2024 Yes   Final    Specimen UA 12/07/2024 Urine, Clean Catch   Final    Color, UA 12/07/2024 Yellow  Yellow, Straw, Naz Final    Appearance, UA 12/07/2024 Hazy (A)  Clear Final    pH, UA 12/07/2024 7.0  5.0 - 8.0 Final    Specific Gravity, UA 12/07/2024 1.030  1.005 - 1.030 Final    Protein, UA 12/07/2024 Trace (A)  Negative Final    Comment: Recommend a 24 hour urine protein or a urine   protein/creatinine ratio if globulin induced proteinuria is  clinically suspected.      Glucose, UA 12/07/2024 Negative  Negative Final    Ketones, UA 12/07/2024 Negative  Negative Final    Bilirubin (UA) 12/07/2024 Negative  Negative Final    Occult Blood UA 12/07/2024 Negative  Negative Final    Nitrite, UA 12/07/2024 Negative  Negative Final    Urobilinogen, UA 12/07/2024 4.0-6.0 (A)  <2.0 EU/dL Final    Leukocytes, UA 12/07/2024 3+ (A)  Negative Final    WBC 12/07/2024 6.25  3.90 - 12.70 K/uL Final    RBC 12/07/2024 3.60 (L)  4.00 - 5.40 M/uL Final    Hemoglobin 12/07/2024 11.4 (L)  12.0 - 16.0 g/dL Final    Hematocrit 12/07/2024 32.2 (L)  37.0 - 48.5 % Final    MCV 12/07/2024 89  82 - 98 fL Final    MCH 12/07/2024 31.7 (H)  27.0 - 31.0 pg Final    MCHC 12/07/2024 35.4  32.0 - 36.0 g/dL Final    RDW 12/07/2024 13.0  11.5 - 14.5 % Final    Platelets 12/07/2024 252  150 - 450 K/uL Final    MPV 12/07/2024 10.5  9.2 - 12.9 fL Final    Immature Granulocytes 12/07/2024 0.2  0.0 - 0.5 % Final    Gran # (ANC) 12/07/2024 3.2  1.8 - 7.7 K/uL Final    Immature Grans (Abs) 12/07/2024 0.01  0.00 - 0.04 K/uL Final    Comment: Mild elevation in  immature granulocytes is non specific and   can be seen in a variety of conditions including stress response,   acute inflammation, trauma and pregnancy. Correlation with other   laboratory and clinical findings is essential.      Lymph # 12/07/2024 2.2  1.0 - 4.8 K/uL Final    Mono # 12/07/2024 0.4  0.3 - 1.0 K/uL Final    Eos # 12/07/2024 0.4  0.0 - 0.5 K/uL Final    Baso # 12/07/2024 0.05  0.00 - 0.20 K/uL Final    nRBC 12/07/2024 0  0 /100 WBC Final    Gran % 12/07/2024 51.4  38.0 - 73.0 % Final    Lymph % 12/07/2024 34.6  18.0 - 48.0 % Final    Mono % 12/07/2024 6.9  4.0 - 15.0 % Final    Eosinophil % 12/07/2024 6.1  0.0 - 8.0 % Final    Basophil % 12/07/2024 0.8  0.0 - 1.9 % Final    Differential Method 12/07/2024 Automated   Final    Sodium 12/07/2024 144  136 - 145 mmol/L Final    Potassium 12/07/2024 3.0 (L)  3.5 - 5.1 mmol/L Final    Chloride 12/07/2024 117 (H)  95 - 110 mmol/L Final    CO2 12/07/2024 18 (L)  23 - 29 mmol/L Final    Glucose 12/07/2024 74  70 - 110 mg/dL Final    BUN 12/07/2024 21  8 - 23 mg/dL Final    Creatinine 12/07/2024 1.1  0.5 - 1.4 mg/dL Final    Calcium 12/07/2024 9.6  8.7 - 10.5 mg/dL Final    Total Protein 12/07/2024 7.9  6.0 - 8.4 g/dL Final    Albumin 12/07/2024 4.4  3.5 - 5.2 g/dL Final    Total Bilirubin 12/07/2024 0.5  0.1 - 1.0 mg/dL Final    Comment: For infants and newborns, interpretation of results should be based  on gestational age, weight and in agreement with clinical  observations.    Premature Infant recommended reference ranges:  Up to 24 hours.............<8.0 mg/dL  Up to 48 hours............<12.0 mg/dL  3-5 days..................<15.0 mg/dL  6-29 days.................<15.0 mg/dL      Alkaline Phosphatase 12/07/2024 84  40 - 150 U/L Final    AST 12/07/2024 18  10 - 40 U/L Final    ALT 12/07/2024 36  10 - 44 U/L Final    eGFR 12/07/2024 57 (A)  >60 mL/min/1.73 m^2 Final    Anion Gap 12/07/2024 9  8 - 16 mmol/L Final    Lipase 12/07/2024 63 (H)  4 - 60 U/L Final     RBC, UA 12/07/2024 6 (H)  0 - 4 /hpf Final    WBC, UA 12/07/2024 91 (H)  0 - 5 /hpf Final    WBC Clumps, UA 12/07/2024 Occasional (A)  None-Rare Final    Bacteria 12/07/2024 Many (A)  None-Occ /hpf Final    Squam Epithel, UA 12/07/2024 1  /hpf Final    Hyaline Casts, UA 12/07/2024 4 (A)  0-1/lpf /lpf Final    Microscopic Comment 12/07/2024 SEE COMMENT   Final    Comment: Other formed elements not mentioned in the report are not   present in the microscopic examination.       Urine Culture, Routine 12/07/2024  (A)   Final                    Value:ESCHERICHIA COLI  >100,000 cfu/ml      POCT Glucose 12/07/2024 75  70 - 110 mg/dL Final         ALLERGIES AND MEDICATIONS: updated and reviewed.  Review of patient's allergies indicates:   Allergen Reactions    Oxycodone-acetaminophen Hives and Itching       Medication List with Changes/Refills   Current Medications    ACETAZOLAMIDE (DIAMOX) 500 MG CPSR    Take 1 capsule (500 mg total) by mouth 2 (two) times daily.    ATORVASTATIN (LIPITOR) 80 MG TABLET    Take 1 tablet (80 mg total) by mouth once daily.    ATROPINE 1% (ISOPTO ATROPINE) 1 % DROP    Place 1 drop into the left eye 2 (two) times a day.    BLOOD-GLUCOSE METER KIT    To check BG 1 times daily, to use with insurance preferred meter    BRIMONIDINE-TIMOLOL (COMBIGAN) 0.2-0.5 % DROP    Place 1 drop into the left eye 3 (three) times daily.    CETIRIZINE (ZYRTEC) 10 MG CAP    Take 10 mg by mouth Daily.    CICLOPIROX (PENLAC) 8 % SOLN    Apply topically nightly.    DORZOLAMIDE-TIMOLOL, PF, (COSOPT, PF,) 2-0.5 % DPET OPHTHALMIC SOLUTION    Place 1 drop into the left eye 2 (two) times daily.    FAMOTIDINE (PEPCID) 20 MG TABLET    Take 1 tablet (20 mg total) by mouth 1 (one) time if needed (itching).    FLUTICASONE PROPIONATE (FLONASE) 50 MCG/ACTUATION NASAL SPRAY    1 spray (50 mcg total) by Each Nostril route once daily.    LISINOPRIL (PRINIVIL,ZESTRIL) 20 MG TABLET    Take 1 tablet (20 mg total) by mouth once daily.     MECLIZINE (ANTIVERT) 25 MG TABLET    Take 1 tablet (25 mg total) by mouth 3 (three) times daily as needed for Dizziness.    ONDANSETRON (ZOFRAN-ODT) 8 MG TBDL    Take 1 tablet (8 mg total) by mouth every 8 (eight) hours as needed.    PREDNISOLONE ACETATE (PRED FORTE) 1 % DRPS    Place 1 drop into the left eye 4 (four) times daily.    SEMAGLUTIDE (OZEMPIC) 0.25 MG OR 0.5 MG (2 MG/3 ML) PEN INJECTOR    Inject 0.5 mg into the skin every 7 days. Decreased dose

## 2025-01-02 ENCOUNTER — TELEPHONE (OUTPATIENT)
Dept: OPHTHALMOLOGY | Facility: CLINIC | Age: 65
End: 2025-01-02

## 2025-01-02 NOTE — TELEPHONE ENCOUNTER
Called pt and spoke to her directly.   I also called [facility] and explained to them that in the encounters tab they can see all of our notes  that they did not need previous clinic notes notes they have epic as well they just need to pull it up. I spoke to Napoleon at [facility] as well as Sharmaine. They both stated they would contact the patient directly and get her scheduled.

## 2025-01-02 NOTE — TELEPHONE ENCOUNTER
----- Message from Luz sent at 1/2/2025  9:15 AM CST -----  Type:  Needs Medical Advice    Who Called: Torsten  Symptoms (please be specific):    How long has patient had these symptoms:    Pharmacy name and phone #:    Would the patient rather a call back or a response via [facility]? Call  Best Call Back Number:  [phone]  Additional Information: Patient needs to have her medical records fax to Dr Garland Begum for [facility]    Fax # is [phone] Patient insurance is out of network

## 2025-01-16 DIAGNOSIS — E11.9 TYPE 2 DIABETES MELLITUS WITHOUT COMPLICATION, WITHOUT LONG-TERM CURRENT USE OF INSULIN: ICD-10-CM

## 2025-01-16 RX ORDER — INSULIN PUMP SYRINGE, 3 ML
EACH MISCELLANEOUS
Qty: 1 EACH | Refills: 0 | Status: SHIPPED | OUTPATIENT
Start: 2025-01-16

## 2025-01-16 NOTE — TELEPHONE ENCOUNTER
Refill Decision Note   Torsten Trevizo  is requesting a refill authorization.  Brief Assessment and Rationale for Refill:  Approve     Medication Therapy Plan: ED visit reviewed, no change to therapy      Extended chart review required: Yes   Comments:     Note composed:3:12 PM 01/16/2025

## 2025-01-16 NOTE — TELEPHONE ENCOUNTER
Provider Staff:  . This patient has received emergency care.     Please schedule patient for a follow up appointment.     Thanks!  [facility]     Appointments      Date Provider   Last Visit   12/28/2024 Diaz Yip MD   Next Visit   4/25/2025 Diaz Yip MD

## 2025-03-07 ENCOUNTER — PATIENT MESSAGE (OUTPATIENT)
Dept: FAMILY MEDICINE | Facility: CLINIC | Age: 65
End: 2025-03-07
Payer: COMMERCIAL

## 2025-03-07 NOTE — ASSESSMENT & PLAN NOTE
03/10/2025:  Blood pressure today stable update prescription for lisinopril    Orders:    lisinopriL (PRINIVIL,ZESTRIL) 20 MG tablet; Take 1 tablet (20 mg total) by mouth once daily.

## 2025-03-07 NOTE — PROGRESS NOTES
This note was created by combination of typed  and M-Modal dictation.  Transcription errors may be present.   This note was also generated with the assistance of ambient listening technology. Verbal consent was obtained by the patient and accompanying visitor(s) for the recording of patient appointment to facilitate this note. I attest to having reviewed and edited the generated note for accuracy, though some syntax or spelling errors may persist. Please contact the author of this note for any clarification.    Assessment and Plan:   Assessment and Plan    Assessment & Plan  Type 2 diabetes mellitus without complication, without long-term current use of insulin hx metformin with GI SE; saxagliptin expensive  03/10/2025:  Most recent A1c controlled.  Back on Ozempic.  Had previously been on Ozempic stopped because of insurance issues, started on glipizide but now off of glipizide now back on Ozempic.  Followed by Ophthalmology.    Orders:    OZEMPIC 1 mg/dose (4 mg/3 mL); Inject 1 mg into the skin every 7 days.    Essential hypertension hx of hctz, amlodipine, lisinopril, propranolol  03/10/2025:  Blood pressure today stable update prescription for lisinopril    Orders:    lisinopriL (PRINIVIL,ZESTRIL) 20 MG tablet; Take 1 tablet (20 mg total) by mouth once daily.    Dyslipidemia hx of crestor, lipitor  03/10/2025: Update prescription for atorvastatin.  Check future labs    Orders:    atorvastatin (LIPITOR) 80 MG tablet; Take 1 tablet (80 mg total) by mouth once daily.    Gastroesophageal reflux disease without esophagitis  03/10/2025:  Stable.  Takes Pepcid p.r.n..         Tingling in extremities  03/10/2025:  She thinks that this preceded starting Diamox and has persisted since stopping Diamox which she had stopped after her surgery in mid January.  Tingling the fingers, the toes, in the right side of her leg.  Denies chronic neck or back pain.  She reports she was supposed to see Neurology but never got  that set up with Alex Tripathi.    Alex Tripathi did labs in January, SPEP, UPEP, B12, TSH all normal.  Referral to Neurology   Trial of gabapentin.  Titrate up.    Orders:    Ambulatory referral/consult to Neurology; Future    gabapentin (NEURONTIN) 300 MG capsule; Take 1 capsule (300 mg total) by mouth every evening.    Nonallergic rhinitis  03/10/2025: Requesting refill on Zyrtec.  Taking Zyrtec as well as Flonase.  Still gets some nasal drip.    Orders:    cetirizine (ZYRTEC) 10 mg Cap; Take 10 mg by mouth Daily.    Need for shingles vaccine  03/10/2025:  Initially agreed to have this done today but she notes that someone is waiting for and she will return tomorrow for this.  Orders:    varicella zoster (Shingrix) IM vaccine (>/= 51 yo)        Medications Discontinued During This Encounter   Medication Reason    dorzolamide-timolol, PF, (COSOPT, PF,) 2-0.5 % Dpet ophthalmic solution Alternate therapy    prednisoLONE acetate (PRED FORTE) 1 % DrpS Alternate therapy    lisinopriL (PRINIVIL,ZESTRIL) 20 MG tablet Reorder    atorvastatin (LIPITOR) 80 MG tablet Reorder    OZEMPIC 1 mg/dose (4 mg/3 mL) Reorder    acetaZOLAMIDE (DIAMOX) 500 mg CpSR Therapy completed    cetirizine (ZYRTEC) 10 mg Cap Reorder       meds sent this encounter:  Medications Ordered This Encounter   Medications    atorvastatin (LIPITOR) 80 MG tablet     Sig: Take 1 tablet (80 mg total) by mouth once daily.     Dispense:  90 tablet     Refill:  3     Increased dose    cetirizine (ZYRTEC) 10 mg Cap     Sig: Take 10 mg by mouth Daily.     Dispense:  90 capsule     Refill:  3    gabapentin (NEURONTIN) 300 MG capsule     Sig: Take 1 capsule (300 mg total) by mouth every evening.     Dispense:  30 capsule     Refill:  11    lisinopriL (PRINIVIL,ZESTRIL) 20 MG tablet     Sig: Take 1 tablet (20 mg total) by mouth once daily.     Dispense:  90 tablet     Refill:  3     Pharmacy update refills, keep on file, not requesting Rx to be filled today.    OZEMPIC 1  mg/dose (4 mg/3 mL)     Sig: Inject 1 mg into the skin every 7 days.     Dispense:  12 each     Refill:  3    varicella zoster (Shingrix) IM vaccine (>/= 49 yo)         Follow Up:  Follow-up 3 months with labs  Future Appointments   Date Time Provider Department [place]   4/16/2025  7:00 AM LAB, [facility]   4/16/2025  7:15 AM SPECIMEN, [facility] [place]         Subjective:   Subjective   Chief Complaint   Patient presents with    Follow-up       HPI  Torsten is a 64 y.o. female.    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     Employer: [employer]   Tobacco Use    Smoking status: Never    Smokeless tobacco: Never   Substance and Sexual Activity    Alcohol use: Yes     Comment: socially    Drug use: Never     Social Drivers of Health     Financial Resource Strain: Medium Risk (1/17/2024)    Overall Financial Resource Strain (CARDIA)     Difficulty of Paying Living Expenses: Somewhat hard   Food Insecurity: Food Insecurity Present (1/17/2024)    Hunger Vital Sign     Worried About Running Out of Food in the Last Year: Sometimes true     Ran Out of Food in the Last Year: Sometimes true   Transportation Needs: No Transportation Needs (1/17/2024)    PRAPARE - Transportation     Lack of Transportation (Medical): No     Lack of Transportation (Non-Medical): No   Recent Concern: Transportation Needs - Unmet Transportation Needs (1/8/2024)    PRAPARE - Transportation     Lack of Transportation (Medical): Yes     Lack of Transportation (Non-Medical): Yes   Physical Activity: Unknown (1/17/2024)    Exercise Vital Sign     Days of Exercise per Week: 7 days   Stress: Stress Concern Present (1/17/2024)    [employer] - Occupational Stress Questionnaire     Feeling of Stress : To some extent   Housing Stability: Low Risk  (1/17/2024)    Housing Stability  Vital Sign     Unable to Pay for Housing in the Last Year: No     Number of Places Lived in the Last Year: 2     Unstable Housing in the Last Year: No   Recent Concern: Housing Stability - High Risk (1/8/2024)    Housing Stability Vital Sign     Unable to Pay for Housing in the Last Year: Yes     Unstable Housing in the Last Year: No       No LMP recorded (lmp unknown). Patient has had a hysterectomy.    Last appointment with this clinic was 12/28/2024. Last visit with me 12/28/2024   To summarize last visit and events leading up to today:  Diabetes.  Trulicity possible side effect of fatigue.  Constipation which preceded Trulicity.  Offered to change, she ultimately decided to stay on Trulicity.  Want her to reestablish with digital monitoring.  Hypertension, reestablish with digital monitoring. with 40 lb weight loss I suspect her sensation of dizziness and lightheadedness are from low BP.  Stop HCTZ.  Stay on lisinopril 20.  Hyperlipidemia statin.  Anemia subsequent diagnosis of H pylori  12/19/23 colonoscopy normal repeat 10 years  12/19/23 EGD normal stomach, duodenum.  Path showing Brunner's gland hyperplasia.  H pylori positive  Saw GI 3/19/24. Plan is stool h pylori  Subsequent stool H pylori negative.  Saw ophtho 4/24/24. Central retinal vein occlusion L eye      Last visit me 10/16/2024   Acute low back pain trial of ibuprofen with Flexeril  Diabetes pre visit labs A1c stable on Ozempic 1 mg.  Was previously on Trulicity changed to Ozempic possible side effect of fatigue?  Nausea which is relieved with Zofran  Hyperlipidemia increase atorvastatin to 80  Chronic constipation preceding GLP1.  Has tried a number stool softeners without lasting relief.  Taking MiraLax regularly.  Next would be Linzess.  Up-to-date colonoscopy, negative  Dyspnea with upcoming cardiology consult  Snoring fatigue ordered HST     11/25/2024 ED for eye pain treated for acute angle closure glaucoma, intra-ocular injections given in  the ED     12/7 ED visit.  Complaining of generalized itching, no visible rash on documented exam.  Seems to have coincided with starting acetazolamide and atropine drops.  Labs CMP CO2 low expected.  Potassium low.  Creatinine 1.1 from 0.8  CBC mild anemia  Urinalysis trace protein leukocytes positive microscopic with pyuria  Urine culture growing out E coli sensitive to Macrobid which was given on discharge     12/10/24 e visit for nausea. Tingling and numbness.  Suspect the tingling and numbness is a side effect of the acetazolamide.  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  She has had issues with nausea even preceding the Ozempic but it could be worsening it.  She would be agreeable for trial of lower dose.  Decrease Ozempic from 1 mg down to 0.5 mg     Evisit, sounds like she lost insurance and canot afford ozempic.  History of metformin with GI SE  Last A1c good  Would stop meds monitor glucose and if climbing start glipizide xl 5    01/15/2025 saw  family doctors due to insurance change.  Had complained of numbness and tingling   SPEP and UPEP normal   Urine microalbumin normal   B12 normal   TSH normal   A1c 5.6  RPR negative   HIV negative  ESR and CRP negative  CMP normal   CBC mild anemia hemoglobin 11    Filling   Atorvastatin   Brimonidine timolol  Lisinopril 40  Ozempic    Today's visit:    History of Present Illness    HPI:  Torsten reports tingling and numbness in her hands, toes, and legs, with the sensation being particularly tender on the side of the leg. These symptoms are severe enough to disrupt her sleep, causing restlessness. When symptoms occur, she gets up, shakes her limbs, and walks around to alleviate the discomfort.    The onset of these symptoms is not clearly stated, but appears to have been ongoing for some time. She was previously evaluated at [facility] in January for these issues, undergoing a series of tests including thyroid function, B12 levels, and abnormal proteins, all of which came back normal with no concerning findings.    She was scheduled to see a neurologist for these symptoms but was unable to do so due to insurance issues. She recently regained insurance coverage, allowing her to seek further medical attention for this ongoing problem.    She mentions having undergone eye surgery on January 13th, where a tube shunt was placed in her eye. This was likely related to high eye pressure that had led to an emergency room visit. She was prescribed Diamox for the high eye pressure but had a severe itching reaction, leading to another emergency room visit. The medication was continued due to its effectiveness in managing the eye pressure. The Diamox was discontinued 2 days after the eye surgery.    She also reports occasional stomach acid flare-ups, for which she uses Pepsid as needed, though not as regularly as before.    She denies any pain in the neck or back. She denies any current eye-related symptoms or vision problems other than difficulty driving at night due to vision loss in the right eye.    MEDICATIONS:  - Atorvastatin, for cholesterol  - Lisinopril, for blood pressure  - Ozempic 1 mg, for blood sugar  - Combigan (eye drops)  - Zyrtec, 2 tablets daily, for allergies (patient reports taking 2 due to runny nose)  - Nasal spray  - Pepsid (stomach acid medicine), as needed for occasional flare-ups  - Discontinued glipizide (previously taken for blood sugar)  - Discontinued solumedrol (steroid)  - Discontinued Diamox, 2 days after eye surgery on January 13th due to adverse reaction  - Discontinued gabapentin (taken in the past, reason unknown)    FAMILY HISTORY:  - Sister: History of shingles      ROS:  Eyes: no vision changes  ENT: reports runny nose  Gastrointestinal: reports heartburn  Skin: reports itching  Neurological: reports  numbness  Psychiatric: reports sleep difficulty         Patient Care Team:  Diaz Yip MD as PCP - General (Internal Medicine)      Patient Active Problem List    Diagnosis Date Noted    Pseudophakia of both eyes 12/05/2024    Central retinal vein occlusion with macular edema of left eye 12/05/2024    Neovascular glaucoma of left eye, moderate stage 12/05/2024    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  Path showing Brunner's gland hyperplasia.  H pylori positive      Screening for colon cancer 12/19/23 colonoscopy normal repeat 10 years 06/01/2020 11/21/2016 colonoscopy normal repeat 10 years.  12/19/23 colonoscopy normal repeat 10 years      Primary insomnia hx of melatonin without relief; hx of trazodone 03/13/2020    GERD (gastroesophageal reflux disease) 01/29/2016    Dyslipidemia hx of crestor, lipitor 10/19/2015    Headache chronic with hx of topamax hx of propranolol 10/19/2015    Essential hypertension hx of hctz, amlodipine, lisinopril, propranolol 11/20/2014    Type 2 diabetes mellitus without complication, without long-term current use of insulin hx metformin with GI SE; saxagliptin expensive 11/20/2014       PAST MEDICAL PROBLEMS, PAST SURGICAL HISTORY: please see relevant portions of the electronic medical record    ALLERGIES AND MEDICATIONS: updated and reviewed.  Medication List with Changes/Refills   Current Medications    ACETAZOLAMIDE (DIAMOX) 500 MG CPSR    Take 1 capsule (500 mg total) by mouth 2 (two) times daily.    ATORVASTATIN (LIPITOR) 80 MG TABLET    Take 1 tablet (80 mg total) by mouth once daily.    ATROPINE 1% (ISOPTO ATROPINE) 1 % DROP    Place 1 drop into the left eye 2 (two) times a day.    BLOOD SUGAR DIAGNOSTIC STRP    To check BG 1 times daily, to use with  "insurance preferred meter    BLOOD-GLUCOSE METER KIT    To check BG 1 times daily, to use with insurance preferred meter    BRIMONIDINE-TIMOLOL (COMBIGAN) 0.2-0.5 % DROP    Place 1 drop into the left eye 3 (three) times daily.    CETIRIZINE (ZYRTEC) 10 MG CAP    Take 10 mg by mouth Daily.    CICLOPIROX (PENLAC) 8 % SOLN    Apply topically nightly.    DORZOLAMIDE-TIMOLOL, PF, (COSOPT, PF,) 2-0.5 % DPET OPHTHALMIC SOLUTION    Place 1 drop into the left eye 2 (two) times daily.    FAMOTIDINE (PEPCID) 20 MG TABLET    Take 1 tablet (20 mg total) by mouth 1 (one) time if needed (itching).    FLUTICASONE PROPIONATE (FLONASE) 50 MCG/ACTUATION NASAL SPRAY    1 spray (50 mcg total) by Each Nostril route once daily.    LANCETS MISC    To check BG 1 times daily, to use with insurance preferred meter    LISINOPRIL (PRINIVIL,ZESTRIL) 20 MG TABLET    Take 1 tablet (20 mg total) by mouth once daily.    MECLIZINE (ANTIVERT) 25 MG TABLET    Take 1 tablet (25 mg total) by mouth 3 (three) times daily as needed for Dizziness.    MOXIFLOXACIN (VIGAMOX) 0.5 % OPHTHALMIC SOLUTION    Place into both eyes.    NEOMYCIN-POLYMYXIN-DEXAMETHASONE (DEXACINE) 3.5 MG/G-10,000 UNIT/G-0.1 % OINT    Apply 0.5 inches to eye.    ONDANSETRON (ZOFRAN-ODT) 8 MG TBDL    Take 1 tablet (8 mg total) by mouth every 8 (eight) hours as needed.    OZEMPIC 1 MG/DOSE (4 MG/3 ML)    Inject 1 mg into the skin.    PREDNISOLONE ACETATE (PRED FORTE) 1 % DRPS    Place 1 drop into the left eye 4 (four) times daily.         Objective:   Objective   Physical Exam   Vitals:    03/10/25 0815   BP: 120/62   Pulse: (!) 51   Temp: 98 °F (36.7 °C)   TempSrc: Oral   SpO2: 99%   Weight: 74 kg (163 lb 4 oz)   Height: 5' 6" (1.676 m)    Body mass index is 26.35 kg/m².  Weight: 74 kg (163 lb 4 oz)   Height: 5' 6" (167.6 cm)     Physical Exam  Constitutional:       Appearance: She is well-developed.   HENT:      Right Ear: Tympanic membrane, ear canal and external ear normal.      Left " Ear: Tympanic membrane, ear canal and external ear normal.   Eyes:      General: No scleral icterus.     Extraocular Movements: Extraocular movements intact.      Pupils: Pupils are equal, round, and reactive to light.   Cardiovascular:      Rate and Rhythm: Normal rate and regular rhythm.      Heart sounds: Normal heart sounds. No murmur heard.  Pulmonary:      Effort: Pulmonary effort is normal.      Breath sounds: Normal breath sounds. No wheezing.   Abdominal:      Palpations: Abdomen is soft. There is no hepatomegaly, splenomegaly or mass.      Tenderness: There is no abdominal tenderness.   Musculoskeletal:         General: No deformity. Normal range of motion.      Cervical back: Neck supple.      Right lower leg: No edema.      Left lower leg: No edema.   Lymphadenopathy:      Cervical: No cervical adenopathy.   Skin:     General: Skin is warm and dry.      Findings: No rash.      Comments: On exposed skin   Neurological:      Mental Status: She is alert and oriented to person, place, and time.      Deep Tendon Reflexes: Reflexes are normal and symmetric.   Psychiatric:         Behavior: Behavior normal.         Thought Content: Thought content normal.         Judgment: Judgment normal.

## 2025-03-07 NOTE — ASSESSMENT & PLAN NOTE
03/10/2025:  Most recent A1c controlled.  Back on Ozempic.  Had previously been on Ozempic stopped because of insurance issues, started on glipizide but now off of glipizide now back on Ozempic.  Followed by Ophthalmology.    Orders:    OZEMPIC 1 mg/dose (4 mg/3 mL); Inject 1 mg into the skin every 7 days.

## 2025-03-07 NOTE — ASSESSMENT & PLAN NOTE
03/10/2025: Update prescription for atorvastatin.  Check future labs    Orders:    atorvastatin (LIPITOR) 80 MG tablet; Take 1 tablet (80 mg total) by mouth once daily.

## 2025-03-10 ENCOUNTER — OFFICE VISIT (OUTPATIENT)
Dept: FAMILY MEDICINE | Facility: CLINIC | Age: 65
End: 2025-03-10
Payer: COMMERCIAL

## 2025-03-10 VITALS
WEIGHT: 163.25 LBS | HEIGHT: 66 IN | BODY MASS INDEX: 26.24 KG/M2 | SYSTOLIC BLOOD PRESSURE: 120 MMHG | HEART RATE: 51 BPM | TEMPERATURE: 98 F | DIASTOLIC BLOOD PRESSURE: 62 MMHG | OXYGEN SATURATION: 99 %

## 2025-03-10 DIAGNOSIS — I10 ESSENTIAL HYPERTENSION: ICD-10-CM

## 2025-03-10 DIAGNOSIS — K21.9 GASTROESOPHAGEAL REFLUX DISEASE WITHOUT ESOPHAGITIS: ICD-10-CM

## 2025-03-10 DIAGNOSIS — R20.2 TINGLING IN EXTREMITIES: ICD-10-CM

## 2025-03-10 DIAGNOSIS — J31.0 NONALLERGIC RHINITIS: ICD-10-CM

## 2025-03-10 DIAGNOSIS — E11.9 TYPE 2 DIABETES MELLITUS WITHOUT COMPLICATION, WITHOUT LONG-TERM CURRENT USE OF INSULIN: Primary | ICD-10-CM

## 2025-03-10 DIAGNOSIS — Z23 NEED FOR SHINGLES VACCINE: ICD-10-CM

## 2025-03-10 DIAGNOSIS — E78.5 DYSLIPIDEMIA: ICD-10-CM

## 2025-03-10 PROCEDURE — G2211 COMPLEX E/M VISIT ADD ON: HCPCS | Mod: S$GLB,,, | Performed by: INTERNAL MEDICINE

## 2025-03-10 PROCEDURE — 1160F RVW MEDS BY RX/DR IN RCRD: CPT | Mod: CPTII,S$GLB,, | Performed by: INTERNAL MEDICINE

## 2025-03-10 PROCEDURE — 3074F SYST BP LT 130 MM HG: CPT | Mod: CPTII,S$GLB,, | Performed by: INTERNAL MEDICINE

## 2025-03-10 PROCEDURE — 3008F BODY MASS INDEX DOCD: CPT | Mod: CPTII,S$GLB,, | Performed by: INTERNAL MEDICINE

## 2025-03-10 PROCEDURE — 3078F DIAST BP <80 MM HG: CPT | Mod: CPTII,S$GLB,, | Performed by: INTERNAL MEDICINE

## 2025-03-10 PROCEDURE — 4010F ACE/ARB THERAPY RXD/TAKEN: CPT | Mod: CPTII,S$GLB,, | Performed by: INTERNAL MEDICINE

## 2025-03-10 PROCEDURE — 99999 PR PBB SHADOW E&M-EST. PATIENT-LVL V: CPT | Mod: PBBFAC,,, | Performed by: INTERNAL MEDICINE

## 2025-03-10 PROCEDURE — 99214 OFFICE O/P EST MOD 30 MIN: CPT | Mod: S$GLB,,, | Performed by: INTERNAL MEDICINE

## 2025-03-10 PROCEDURE — 1159F MED LIST DOCD IN RCRD: CPT | Mod: CPTII,S$GLB,, | Performed by: INTERNAL MEDICINE

## 2025-03-10 PROCEDURE — 3044F HG A1C LEVEL LT 7.0%: CPT | Mod: CPTII,S$GLB,, | Performed by: INTERNAL MEDICINE

## 2025-03-10 RX ORDER — MOXIFLOXACIN 5 MG/ML
SOLUTION/ DROPS OPHTHALMIC
COMMUNITY
Start: 2025-01-14

## 2025-03-10 RX ORDER — CETIRIZINE HYDROCHLORIDE 10 MG/1
10 CAPSULE, LIQUID FILLED ORAL DAILY
Qty: 90 CAPSULE | Refills: 3 | Status: SHIPPED | OUTPATIENT
Start: 2025-03-10

## 2025-03-10 RX ORDER — LISINOPRIL 20 MG/1
20 TABLET ORAL DAILY
Qty: 90 TABLET | Refills: 3 | Status: SHIPPED | OUTPATIENT
Start: 2025-03-10

## 2025-03-10 RX ORDER — ATORVASTATIN CALCIUM 80 MG/1
80 TABLET, FILM COATED ORAL DAILY
Qty: 90 TABLET | Refills: 3 | Status: SHIPPED | OUTPATIENT
Start: 2025-03-10

## 2025-03-10 RX ORDER — SEMAGLUTIDE 1.34 MG/ML
1 INJECTION, SOLUTION SUBCUTANEOUS
Qty: 12 EACH | Refills: 3 | Status: SHIPPED | OUTPATIENT
Start: 2025-03-10

## 2025-03-10 RX ORDER — NEOMYCIN SULFATE, POLYMYXIN B SULFATE, AND DEXAMETHASONE 3.5; 10000; 1 MG/G; [USP'U]/G; MG/G
0.5 OINTMENT OPHTHALMIC
COMMUNITY
Start: 2025-01-14

## 2025-03-10 RX ORDER — SEMAGLUTIDE 1.34 MG/ML
1 INJECTION, SOLUTION SUBCUTANEOUS
COMMUNITY
Start: 2025-02-10 | End: 2025-03-10 | Stop reason: SDUPTHER

## 2025-03-10 RX ORDER — GABAPENTIN 300 MG/1
300 CAPSULE ORAL NIGHTLY
Qty: 30 CAPSULE | Refills: 11 | Status: SHIPPED | OUTPATIENT
Start: 2025-03-10 | End: 2026-03-10

## 2025-03-10 NOTE — PATIENT INSTRUCTIONS
If you have not been contacted in a week about your referral, please call my Referrals Coordinator at [phone]

## 2025-03-18 ENCOUNTER — E-VISIT (OUTPATIENT)
Dept: FAMILY MEDICINE | Facility: CLINIC | Age: 65
End: 2025-03-18
Payer: COMMERCIAL

## 2025-03-18 DIAGNOSIS — R19.7 DIARRHEA, UNSPECIFIED TYPE: Primary | ICD-10-CM

## 2025-03-18 DIAGNOSIS — K92.1 BLOOD IN STOOL: ICD-10-CM

## 2025-03-19 ENCOUNTER — TELEPHONE (OUTPATIENT)
Dept: NEUROLOGY | Facility: CLINIC | Age: 65
End: 2025-03-19

## 2025-03-19 ENCOUNTER — OFFICE VISIT (OUTPATIENT)
Dept: NEUROLOGY | Facility: CLINIC | Age: 65
End: 2025-03-19
Payer: COMMERCIAL

## 2025-03-19 DIAGNOSIS — M54.16 LUMBAR RADICULOPATHY: Primary | ICD-10-CM

## 2025-03-19 DIAGNOSIS — R20.2 TINGLING IN EXTREMITIES: ICD-10-CM

## 2025-03-19 NOTE — TELEPHONE ENCOUNTER
Spoke to pt and scheduled MRI. Forwarded pt information to [facility] and EM[facility] department to get scheduled.

## 2025-03-19 NOTE — PROGRESS NOTES
Neurology Telemedicine Note     Name: Torsten Trevizo  MRN: [id number]   CSN: [id number]      Date: 03/19/2025    The patient location is: Home  The chief complaint leading to consultation is: numbness, tingling  Visit type: Virtual visit with synchronous audio and video    TOTAL TIME SPENT: 30 mins    Each patient to whom I provide medical services by telemedicine is:  (1) informed of the relationship between the physician and patient and the respective role of any other health care provider with respect to management of the patient; and (2) notified that they may decline to receive medical services by telemedicine and may withdraw from such care at any time.    History of Present Illness (HPI):  Patient is a 64-year-old female with past medical history as below holding diabetes presents to tele Neurology Clinic due to paresthesias in her fingertips have a going off for a year and pain and tingling in her left leg and back.  Patient states that she has pain and tingling in her and her leg including her toes that can be painful and it could be a shooting pain coming from her back going down her leg.  At times patient states that she feels like her leg can give out on her.  She states yesterday she had severe back pain that lasted all day.  Patient has not had any imaging on her lumbar spine and denies any red flag symptoms such as urinary retention or bowel issues.    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    Social History: The patient  reports that she has never smoked. She has never used smokeless tobacco. She reports current alcohol use. She reports that she does not use drugs.    Family History: Their family history includes Cataracts in her father and mother; Coronary artery disease in her  mother; Diabetes in her mother; Glaucoma in her daughter and daughter; Hypertension in her father and mother; Lung cancer in her brother; Prostate cancer in her father; Stomach cancer in her brother; Stroke in her father.    Allergies: Oxycodone-acetaminophen     Meds: Medications Ordered Prior to Encounter[1]    Exam:  Vital Signs deferred with home visit    Constitutional  Well-developed, well-nourished, appears stated age   Eyes  No scleral icterus   ENT  Moist oral mucosa   Cardiovascular  No lower extremity edema    Respiratory  No labored breathing    Skin  No rashes   Hematologic  No bruising   Psychiatric  Normal mood and affect   Other  GI/ deferred    Neurological     * Mental status  Alert and oriented to person, place, time, and situation; no dysarthria; no aphasia; normal recent and remote memory; follows commands   * Cranial nerves     - CN II  Pupils midposition and symmetric   - CN III, IV, VI  Extraocular movements full, no nystagmus visualized   - CN V  Unable to test   - CN VII  Face strong and symmetric bilaterally    - CN VIII  Hearing grossly intact with conversation    - CN IX, X  Palate raises midline and symmetric    - CN XI  Strong shoulder shrug B    - CN XII  Tongue appears midline    * Motor  Normal bulk by appearance, no drift    * Sensory  Not tested objectively, no changes described by the patient   * Deep tendon reflexes  Not tested   * Coord/Movemt/Gait No hypophonic speech.  No facial masking.  No B bradykinesia.  No tremor with rest, posture, kinesis, or intention.   No chorea, athetosis, dystonia, myoclonus, or tics.   No motor impersistence.  Gait is deferred for safety.       Medical Record Review:  - Lab Results:  No visits with results within 3 Month(s) from this visit.   Latest known visit with results is:   Admission on 12/07/2024, Discharged on 12/07/2024   Component Date Value Ref Range Status    POC Rapid COVID 12/07/2024 Negative  Negative Final      Acceptable 12/07/2024 Yes   Final    POC Molecular Influenza A Ag 12/07/2024 Negative  Negative Final    POC Molecular Influenza B Ag 12/07/2024 Negative  Negative Final     Acceptable 12/07/2024 Yes   Final    Specimen UA 12/07/2024 Urine, Clean Catch   Final    Color, UA 12/07/2024 Yellow  Yellow, Straw, Naz Final    Appearance, UA 12/07/2024 Hazy (A)  Clear Final    pH, UA 12/07/2024 7.0  5.0 - 8.0 Final    Specific Gravity, UA 12/07/2024 1.030  1.005 - 1.030 Final    Protein, UA 12/07/2024 Trace (A)  Negative Final    Glucose, UA 12/07/2024 Negative  Negative Final    Ketones, UA 12/07/2024 Negative  Negative Final    Bilirubin (UA) 12/07/2024 Negative  Negative Final    Occult Blood UA 12/07/2024 Negative  Negative Final    Nitrite, UA 12/07/2024 Negative  Negative Final    Urobilinogen, UA 12/07/2024 4.0-6.0 (A)  <2.0 EU/dL Final    Leukocytes, UA 12/07/2024 3+ (A)  Negative Final    WBC 12/07/2024 6.25  3.90 - 12.70 K/uL Final    RBC 12/07/2024 3.60 (L)  4.00 - 5.40 M/uL Final    Hemoglobin 12/07/2024 11.4 (L)  12.0 - 16.0 g/dL Final    Hematocrit 12/07/2024 32.2 (L)  37.0 - 48.5 % Final    MCV 12/07/2024 89  82 - 98 fL Final    MCH 12/07/2024 31.7 (H)  27.0 - 31.0 pg Final    MCHC 12/07/2024 35.4  32.0 - 36.0 g/dL Final    RDW 12/07/2024 13.0  11.5 - 14.5 % Final    Platelets 12/07/2024 252  150 - 450 K/uL Final    MPV 12/07/2024 10.5  9.2 - 12.9 fL Final    Immature Granulocytes 12/07/2024 0.2  0.0 - 0.5 % Final    Gran # (ANC) 12/07/2024 3.2  1.8 - 7.7 K/uL Final    Immature Grans (Abs) 12/07/2024 0.01  0.00 - 0.04 K/uL Final    Lymph # 12/07/2024 2.2  1.0 - 4.8 K/uL Final    Mono # 12/07/2024 0.4  0.3 - 1.0 K/uL Final    Eos # 12/07/2024 0.4  0.0 - 0.5 K/uL Final    Baso # 12/07/2024 0.05  0.00 - 0.20 K/uL Final    nRBC 12/07/2024 0  0 /100 WBC Final    Gran % 12/07/2024 51.4  38.0 - 73.0 % Final    Lymph % 12/07/2024 34.6  18.0 - 48.0 % Final    Mono % 12/07/2024 6.9  4.0 - 15.0 % Final     Eosinophil % 2024 6.1  0.0 - 8.0 % Final    Basophil % 2024 0.8  0.0 - 1.9 % Final    Differential Method 2024 Automated   Final    Sodium 2024 144  136 - 145 mmol/L Final    Potassium 2024 3.0 (L)  3.5 - 5.1 mmol/L Final    Chloride 2024 117 (H)  95 - 110 mmol/L Final    CO2 2024 18 (L)  23 - 29 mmol/L Final    Glucose 2024 74  70 - 110 mg/dL Final    BUN 2024 21  8 - 23 mg/dL Final    Creatinine 2024 1.1  0.5 - 1.4 mg/dL Final    Calcium 2024 9.6  8.7 - 10.5 mg/dL Final    Total Protein 2024 7.9  6.0 - 8.4 g/dL Final    Albumin 2024 4.4  3.5 - 5.2 g/dL Final    Total Bilirubin 2024 0.5  0.1 - 1.0 mg/dL Final    Alkaline Phosphatase 2024 84  40 - 150 U/L Final    AST 2024 18  10 - 40 U/L Final    ALT 2024 36  10 - 44 U/L Final    eGFR 2024 57 (A)  >60 mL/min/1.73 m^2 Final    Anion Gap 2024 9  8 - 16 mmol/L Final    Lipase 2024 63 (H)  4 - 60 U/L Final    RBC, UA 2024 6 (H)  0 - 4 /hpf Final    WBC, UA 2024 91 (H)  0 - 5 /hpf Final    WBC Clumps, UA 2024 Occasional (A)  None-Rare Final    Bacteria 2024 Many (A)  None-Occ /hpf Final    Squam Epithel, UA 2024 1  /hpf Final    Hyaline Casts, UA 2024 4 (A)  0-1/lpf /lpf Final    Microscopic Comment 2024 SEE COMMENT   Final    Urine Culture, Routine 2024  (A)   Final                    Value:ESCHERICHIA COLI  >100,000 cfu/ml      POCT Glucose 2024 75  70 - 110 mg/dL Final       Diagnoses:   1) numbness and tingling, paresthesias diabetic etiology versus other?  2) back pain with radiation down the leg with some subjective weakness at times with paresthesias rule out lumbar etiology with imaging and EMG    Medical Decision Makin) MRI of the lumbar spine  2) EMG/nerve conduction study  3) PT for back pain with sciatic symptoms    Follow up in person for physical exam in neuromuscular  Clinic      I spent 30 minutes with the patient with >50% of the time spent with counseling and education regarding:    This is a consult performed through audio-visual using [employer] [facility]. Pt and provider are in different locations. History and physical exam are limited due to the nature of this encounter.       Mainor Hennessy MD          [1]   Current Outpatient Medications on File Prior to Visit   Medication Sig Dispense Refill    atorvastatin (LIPITOR) 80 MG tablet Take 1 tablet (80 mg total) by mouth once daily. 90 tablet 3    atropine 1% (ISOPTO ATROPINE) 1 % Drop Place 1 drop into the left eye 2 (two) times a day. 5 mL 1    blood sugar diagnostic Strp To check BG 1 times daily, to use with insurance preferred meter 100 strip 3    blood-glucose meter kit To check BG 1 times daily, to use with insurance preferred meter 1 each 0    brimonidine-timoloL (COMBIGAN) 0.2-0.5 % Drop Place 1 drop into the left eye 3 (three) times daily. 5 mL 0    cetirizine (ZYRTEC) 10 mg Cap Take 10 mg by mouth Daily. 90 capsule 3    ciclopirox (PENLAC) 8 % Soln Apply topically nightly. 6.6 mL 3    famotidine (PEPCID) 20 MG tablet Take 1 tablet (20 mg total) by mouth 1 (one) time if needed (itching). 5 tablet 0    fluticasone propionate (FLONASE) 50 mcg/actuation nasal spray 1 spray (50 mcg total) by Each Nostril route once daily. 16 g 11    gabapentin (NEURONTIN) 300 MG capsule Take 1 capsule (300 mg total) by mouth every evening. 30 capsule 11    lancets Misc To check BG 1 times daily, to use with insurance preferred meter 100 each 4    lisinopriL (PRINIVIL,ZESTRIL) 20 MG tablet Take 1 tablet (20 mg total) by mouth once daily. 90 tablet 3    meclizine (ANTIVERT) 25 mg tablet Take 1 tablet (25 mg total) by mouth 3 (three) times daily as needed for Dizziness. 30 tablet 0    moxifloxacin (VIGAMOX) 0.5 % ophthalmic solution Place into both eyes.      neomycin-polymyxin-dexamethasone (DEXACINE) 3.5 mg/g-10,000 unit/g-0.1 % Oint  Apply 0.5 inches to eye.      ondansetron (ZOFRAN-ODT) 8 MG TbDL Take 1 tablet (8 mg total) by mouth every 8 (eight) hours as needed. 25 tablet 0    OZEMPIC 1 mg/dose (4 mg/3 mL) Inject 1 mg into the skin every 7 days. 12 each 3     Current Facility-Administered Medications on File Prior to Visit   Medication Dose Route Frequency Provider Last Rate Last Admin    varicella zoster (Shingrix) IM vaccine (>/= 51 yo)  0.5 mL Intramuscular 1 time in Clinic/HOD

## 2025-03-21 ENCOUNTER — TELEPHONE (OUTPATIENT)
Dept: NEUROLOGY | Facility: CLINIC | Age: 65
End: 2025-03-21
Payer: COMMERCIAL

## 2025-03-21 RX ORDER — ONDANSETRON 8 MG/1
8 TABLET, ORALLY DISINTEGRATING ORAL EVERY 8 HOURS PRN
Qty: 25 TABLET | Refills: 0 | Status: SHIPPED | OUTPATIENT
Start: 2025-03-21

## 2025-03-21 NOTE — PROGRESS NOTES
Patient ID: Torsten Trevizo is a 64 y.o. female.    This note was created by combination of typed  and M-Modal dictation.  Transcription errors may be present.  If there are any questions, please contact me.    Assessment and Plan:     Assessment & Plan  Diarrhea, unspecified type  Blood in stool  03/21/2025:  Had EGD and colonoscopy 12/2023 essentially normal.  Can not rule out inflammatory causes or bacterial infection.  Would hold off on antidiarrheals, will order Zofran for nausea but if possible I have asked her to come in to drop off stool sample to check for infection  Orders:    WBC, Stool; Future    Stool culture; Future    Giardia / Cryptosporidum, EIA; Future    Rotavirus antigen, stool; Future    ondansetron (ZOFRAN-ODT) 8 MG TbDL; Take 1 tablet (8 mg total) by mouth every 8 (eight) hours as needed.      Orders Placed This Encounter   Procedures    Stool culture     Standing Status:   Future     Expected Date:   3/21/2025     Expiration Date:   3/21/2026     Send normal result to authorizing provider's In Basket if patient is active on MyChart::   Yes    WBC, Stool     Standing Status:   Future     Expected Date:   3/21/2025     Expiration Date:   3/21/2026     Send normal result to authorizing provider's In Basket if patient is active on MyChart::   Yes    Giardia / Cryptosporidum, EIA     Standing Status:   Future     Expected Date:   3/21/2025     Expiration Date:   5/20/2026     Send normal result to authorizing provider's In Basket if patient is active on MyChart::   Yes    Rotavirus antigen, stool     Standing Status:   Future     Expected Date:   3/21/2025     Expiration Date:   3/21/2026     Send normal result to authorizing provider's In Basket if patient is active on MyChart::   Yes      Medications Ordered This Encounter   Medications    ondansetron (ZOFRAN-ODT) 8 MG TbDL     Sig: Take 1 tablet (8 mg total) by mouth every 8 (eight) hours as needed.     Dispense:  25 tablet      Refill:  0        No follow-ups on file. or sooner as needed.        E-Visit Time Tracking:    Day 1, 11 min               274}    Follow Up: No follow-ups on file.    Subjective:   Patient ID: Torsten Trevizo is a 64 y.o. female.    Chief Complaint: General Illness (Entered automatically based on patient selection in [employer].)                    274}    History of Present Illness:  The patient initiated a request through [employer] on 3/18/2025 for evaluation and management with a chief complaint of General Illness (Entered automatically based on patient selection in [employer].)     Diabetes.  Trulicity possible side effect of fatigue.  Constipation which preceded Trulicity.  Offered to change, she ultimately decided to stay on Trulicity.  Want her to reestablish with digital monitoring.  Hypertension, reestablish with digital monitoring. with 40 lb weight loss I suspect her sensation of dizziness and lightheadedness are from low BP.  Stop HCTZ.  Stay on lisinopril 20.  Hyperlipidemia statin.  Anemia subsequent diagnosis of H pylori  12/19/23 colonoscopy normal repeat 10 years  12/19/23 EGD normal stomach, duodenum.  Path showing Brunner's gland hyperplasia.  H pylori positive  Saw GI 3/19/24. Plan is stool h pylori  Subsequent stool H pylori negative.  Saw ophtho 4/24/24. Central retinal vein occlusion L eye      Last visit me 10/16/2024   Acute low back pain trial of ibuprofen with Flexeril  Diabetes pre visit labs A1c stable on Ozempic 1 mg.  Was previously on Trulicity changed to Ozempic possible side effect of fatigue?  Nausea which is relieved with Zofran  Hyperlipidemia increase atorvastatin to 80  Chronic constipation preceding GLP1.  Has tried a number stool softeners without lasting relief.  Taking MiraLax regularly.  Next would be Linzess.  Up-to-date colonoscopy, negative  Dyspnea with upcoming cardiology consult  Snoring fatigue ordered HST     11/25/2024 ED for eye pain treated for acute angle closure  glaucoma, intra-ocular injections given in the ED     12/7 ED visit.  Complaining of generalized itching, no visible rash on documented exam.  Seems to have coincided with starting acetazolamide and atropine drops.  Labs CMP CO2 low expected.  Potassium low.  Creatinine 1.1 from 0.8  CBC mild anemia  Urinalysis trace protein leukocytes positive microscopic with pyuria  Urine culture growing out E coli sensitive to Macrobid which was given on discharge     12/10/24 e visit for nausea. Tingling and numbness.  Suspect the tingling and numbness is a side effect of the acetazolamide.  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  She has had issues with nausea even preceding the Ozempic but it could be worsening it.  She would be agreeable for trial of lower dose.  Decrease Ozempic from 1 mg down to 0.5 mg     Evisit, sounds like she lost insurance and canot afford ozempic.  History of metformin with GI SE  Last A1c good  Would stop meds monitor glucose and if climbing start glipizide xl 5     01/15/2025 saw  family doctors due to insurance change.  Had complained of numbness and tingling   SPEP and UPEP normal   Urine microalbumin normal   B12 normal   TSH normal   A1c 5.6  RPR negative   HIV negative  ESR and CRP negative  CMP normal   CBC mild anemia hemoglobin 11     Last visit with me 03/10/2025   Diabetes back on Ozempic.  Now off of glipizide.  Followed by Ophthalmology.  A1c most recent was good.    Hypertension, hyperlipidemia stable   GERD Pepcid p.r.n.   Extremity tingling which seemed to have preceded Diamox.  Labs SPEP UPEP B12 TSH all normal.  Referred to Neurology.  Trial of gabapentin.  Subsequently seen by Neurology 03/19/2025.  Suspicious for radiculopathy and ordered MRI L-spine.  But also ordered EMG    Patient complaining of diarrhea with blood    I evaluated the  questionnaire submission on 03/21/2025 .    E-visit Questionnaire:  [facility]-Common Problems    3/18/2025  8:21 AM CDT - Filed by Patient   What do you need help with? Bowel Movement Problems   Do you agree to participate in an E-Visit? Yes   If you have any of the following symptoms, please present to your local emergency room or call 911:  I acknowledge   What is the main issue you would like addressed today? Vomiting Diarrhea passing blood in stool   Do you have any of the following symptoms? Belly bloating;  Belly pain;  Change in stool appearance;  Diarrhea;  Diarrhea alternating with constipation;  Feeling like you didnt finish;  Rectal bleeding or tearing;  Stomach cramps;  Stool that is watery;  Unable to hold stools   [place] Stool Scale (BSFS) Type 7: Watery, no solid pieces, entirely liquid (diarrhea)   Do you have any blood in your stool? Yes   What color is the blood in your stool? Other   Describe the color of blood in your stool: Mix i hava photo of it   Have you had unintentional weight loss? Yes   Did your bowel problem begin after you started a new medication? No   When was your last bowel movement? 8am   How many times per week do you normally have a bowel movement? Less than 3   I would like to address: Other concerns related to Bowel Movement Problems   Please describe your symptoms. Stomach pain, tiredness leg cramps vomiting   On a scale of 1-10, where 10 is the worst you can imagine, how severe are your symptoms? (range: 1 - 10) 8   Have you had these symptoms before? Yes   How long have you had these symptoms? A few days   What helps with your symptoms? Nothing everything comes up or out   What makes your symptoms feel worse? Movement or to eat something   Are your symptoms related to a condition you currently have? Not sure   When were you last seen for this condition?    Please describe any probable cause for your symptoms. I donr know   Provide any additional information  you feel is important.    Please attach any relevant images or files    Are you able to take your vital signs? 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  Path showing Brunner's gland hyperplasia.  H pylori positive      Screening for colon cancer 12/19/23 colonoscopy normal repeat 10 years 06/01/2020 11/21/2016 colonoscopy normal repeat 10 years.  12/19/23 colonoscopy normal repeat 10 years      Primary insomnia hx of melatonin without relief; hx of trazodone 03/13/2020    GERD (gastroesophageal reflux disease) 01/29/2016    Dyslipidemia hx of crestor, lipitor 10/19/2015    Headache chronic with hx of topamax hx of propranolol 10/19/2015    Essential hypertension hx of hctz, amlodipine, lisinopril, propranolol 11/20/2014    Type 2 diabetes mellitus without complication, without long-term current use of insulin hx metformin with GI SE; saxagliptin expensive 11/20/2014        Recent Labs Obtained:  No visits with results within 7 Day(s) from this visit.   Latest known visit with results is:   Admission on 12/07/2024, Discharged on 12/07/2024   Component Date Value Ref Range Status    POC Rapid COVID 12/07/2024 Negative  Negative Final     Acceptable 12/07/2024 Yes   Final    POC Molecular Influenza A Ag 12/07/2024 Negative  Negative Final    POC Molecular Influenza B Ag 12/07/2024 Negative  Negative Final     Acceptable 12/07/2024 Yes   Final    Specimen UA 12/07/2024 Urine, Clean Catch   Final    Color, UA  12/07/2024 Yellow  Yellow, Straw, Naz Final    Appearance, UA 12/07/2024 Hazy (A)  Clear Final    pH, UA 12/07/2024 7.0  5.0 - 8.0 Final    Specific Gravity, UA 12/07/2024 1.030  1.005 - 1.030 Final    Protein, UA 12/07/2024 Trace (A)  Negative Final    Comment: Recommend a 24 hour urine protein or a urine   protein/creatinine ratio if globulin induced proteinuria is  clinically suspected.      Glucose, UA 12/07/2024 Negative  Negative Final    Ketones, UA 12/07/2024 Negative  Negative Final    Bilirubin (UA) 12/07/2024 Negative  Negative Final    Occult Blood UA 12/07/2024 Negative  Negative Final    Nitrite, UA 12/07/2024 Negative  Negative Final    Urobilinogen, UA 12/07/2024 4.0-6.0 (A)  <2.0 EU/dL Final    Leukocytes, UA 12/07/2024 3+ (A)  Negative Final    WBC 12/07/2024 6.25  3.90 - 12.70 K/uL Final    RBC 12/07/2024 3.60 (L)  4.00 - 5.40 M/uL Final    Hemoglobin 12/07/2024 11.4 (L)  12.0 - 16.0 g/dL Final    Hematocrit 12/07/2024 32.2 (L)  37.0 - 48.5 % Final    MCV 12/07/2024 89  82 - 98 fL Final    MCH 12/07/2024 31.7 (H)  27.0 - 31.0 pg Final    MCHC 12/07/2024 35.4  32.0 - 36.0 g/dL Final    RDW 12/07/2024 13.0  11.5 - 14.5 % Final    Platelets 12/07/2024 252  150 - 450 K/uL Final    MPV 12/07/2024 10.5  9.2 - 12.9 fL Final    Immature Granulocytes 12/07/2024 0.2  0.0 - 0.5 % Final    Gran # (ANC) 12/07/2024 3.2  1.8 - 7.7 K/uL Final    Immature Grans (Abs) 12/07/2024 0.01  0.00 - 0.04 K/uL Final    Comment: Mild elevation in immature granulocytes is non specific and   can be seen in a variety of conditions including stress response,   acute inflammation, trauma and pregnancy. Correlation with other   laboratory and clinical findings is essential.      Lymph # 12/07/2024 2.2  1.0 - 4.8 K/uL Final    Mono # 12/07/2024 0.4  0.3 - 1.0 K/uL Final    Eos # 12/07/2024 0.4  0.0 - 0.5 K/uL Final    Baso # 12/07/2024 0.05  0.00 - 0.20 K/uL Final    nRBC 12/07/2024 0  0 /100 WBC Final    Gran % 12/07/2024 51.4   38.0 - 73.0 % Final    Lymph % 12/07/2024 34.6  18.0 - 48.0 % Final    Mono % 12/07/2024 6.9  4.0 - 15.0 % Final    Eosinophil % 12/07/2024 6.1  0.0 - 8.0 % Final    Basophil % 12/07/2024 0.8  0.0 - 1.9 % Final    Differential Method 12/07/2024 Automated   Final    Sodium 12/07/2024 144  136 - 145 mmol/L Final    Potassium 12/07/2024 3.0 (L)  3.5 - 5.1 mmol/L Final    Chloride 12/07/2024 117 (H)  95 - 110 mmol/L Final    CO2 12/07/2024 18 (L)  23 - 29 mmol/L Final    Glucose 12/07/2024 74  70 - 110 mg/dL Final    BUN 12/07/2024 21  8 - 23 mg/dL Final    Creatinine 12/07/2024 1.1  0.5 - 1.4 mg/dL Final    Calcium 12/07/2024 9.6  8.7 - 10.5 mg/dL Final    Total Protein 12/07/2024 7.9  6.0 - 8.4 g/dL Final    Albumin 12/07/2024 4.4  3.5 - 5.2 g/dL Final    Total Bilirubin 12/07/2024 0.5  0.1 - 1.0 mg/dL Final    Comment: For infants and newborns, interpretation of results should be based  on gestational age, weight and in agreement with clinical  observations.    Premature Infant recommended reference ranges:  Up to 24 hours.............<8.0 mg/dL  Up to 48 hours............<12.0 mg/dL  3-5 days..................<15.0 mg/dL  6-29 days.................<15.0 mg/dL      Alkaline Phosphatase 12/07/2024 84  40 - 150 U/L Final    AST 12/07/2024 18  10 - 40 U/L Final    ALT 12/07/2024 36  10 - 44 U/L Final    eGFR 12/07/2024 57 (A)  >60 mL/min/1.73 m^2 Final    Anion Gap 12/07/2024 9  8 - 16 mmol/L Final    Lipase 12/07/2024 63 (H)  4 - 60 U/L Final    RBC, UA 12/07/2024 6 (H)  0 - 4 /hpf Final    WBC, UA 12/07/2024 91 (H)  0 - 5 /hpf Final    WBC Clumps, UA 12/07/2024 Occasional (A)  None-Rare Final    Bacteria 12/07/2024 Many (A)  None-Occ /hpf Final    Squam Epithel, UA 12/07/2024 1  /hpf Final    Hyaline Casts, UA 12/07/2024 4 (A)  0-1/lpf /lpf Final    Microscopic Comment 12/07/2024 SEE COMMENT   Final    Comment: Other formed elements not mentioned in the report are not   present in the microscopic examination.        Urine Culture, Routine 12/07/2024  (A)   Final                    Value:ESCHERICHIA COLI  >100,000 cfu/ml      POCT Glucose 12/07/2024 75  70 - 110 mg/dL Final         ALLERGIES AND MEDICATIONS: updated and reviewed.  Review of patient's allergies indicates:   Allergen Reactions    Oxycodone-acetaminophen Hives and Itching       Medication List with Changes/Refills   Current Medications    ATORVASTATIN (LIPITOR) 80 MG TABLET    Take 1 tablet (80 mg total) by mouth once daily.    ATROPINE 1% (ISOPTO ATROPINE) 1 % DROP    Place 1 drop into the left eye 2 (two) times a day.    BLOOD SUGAR DIAGNOSTIC STRP    To check BG 1 times daily, to use with insurance preferred meter    BLOOD-GLUCOSE METER KIT    To check BG 1 times daily, to use with insurance preferred meter    BRIMONIDINE-TIMOLOL (COMBIGAN) 0.2-0.5 % DROP    Place 1 drop into the left eye 3 (three) times daily.    CETIRIZINE (ZYRTEC) 10 MG CAP    Take 10 mg by mouth Daily.    CICLOPIROX (PENLAC) 8 % SOLN    Apply topically nightly.    FAMOTIDINE (PEPCID) 20 MG TABLET    Take 1 tablet (20 mg total) by mouth 1 (one) time if needed (itching).    FLUTICASONE PROPIONATE (FLONASE) 50 MCG/ACTUATION NASAL SPRAY    1 spray (50 mcg total) by Each Nostril route once daily.    GABAPENTIN (NEURONTIN) 300 MG CAPSULE    Take 1 capsule (300 mg total) by mouth every evening.    LANCETS MISC    To check BG 1 times daily, to use with insurance preferred meter    LISINOPRIL (PRINIVIL,ZESTRIL) 20 MG TABLET    Take 1 tablet (20 mg total) by mouth once daily.    MECLIZINE (ANTIVERT) 25 MG TABLET    Take 1 tablet (25 mg total) by mouth 3 (three) times daily as needed for Dizziness.    MOXIFLOXACIN (VIGAMOX) 0.5 % OPHTHALMIC SOLUTION    Place into both eyes.    NEOMYCIN-POLYMYXIN-DEXAMETHASONE (DEXACINE) 3.5 MG/G-10,000 UNIT/G-0.1 % OINT    Apply 0.5 inches to eye.    ONDANSETRON (ZOFRAN-ODT) 8 MG TBDL    Take 1 tablet (8 mg total) by mouth every 8 (eight) hours as needed.    OZEMPIC 1  MG/DOSE (4 MG/3 ML)    Inject 1 mg into the skin every 7 days.

## 2025-03-26 ENCOUNTER — CLINICAL SUPPORT (OUTPATIENT)
Dept: FAMILY MEDICINE | Facility: CLINIC | Age: 65
End: 2025-03-26
Payer: COMMERCIAL

## 2025-03-26 DIAGNOSIS — Z23 NEED FOR PROPHYLACTIC VACCINATION WITH UNSPECIFIED COMBINED VACCINE: Primary | ICD-10-CM

## 2025-03-26 PROCEDURE — 90471 IMMUNIZATION ADMIN: CPT | Mod: S$GLB,,, | Performed by: INTERNAL MEDICINE

## 2025-03-26 PROCEDURE — 90750 HZV VACC RECOMBINANT IM: CPT | Mod: S$GLB,,, | Performed by: INTERNAL MEDICINE

## 2025-03-26 NOTE — PROGRESS NOTES
Review of patient's allergies indicates:   Allergen Reactions    Oxycodone-acetaminophen Hives and Itching     Creatinine   Date Value Ref Range Status   01/15/2025 0.76 0.50 - 1.05 mg/dL Final   12/07/2024 1.1 0.5 - 1.4 mg/dL Final     Sodium   Date Value Ref Range Status   01/15/2025 141 135 - 146 mmol/L Final   12/07/2024 144 136 - 145 mmol/L Final     Potassium   Date Value Ref Range Status   01/15/2025 3.8 3.5 - 5.3 mmol/L Final   12/07/2024 3.0 (L) 3.5 - 5.1 mmol/L Final

## 2025-04-03 ENCOUNTER — HOSPITAL ENCOUNTER (OUTPATIENT)
Dept: RADIOLOGY | Facility: HOSPITAL | Age: 65
Discharge: HOME | End: 2025-04-03
Attending: PSYCHIATRY & NEUROLOGY
Payer: COMMERCIAL

## 2025-04-03 DIAGNOSIS — M54.16 LUMBAR RADICULOPATHY: ICD-10-CM

## 2025-04-03 DIAGNOSIS — R20.2 TINGLING IN EXTREMITIES: ICD-10-CM

## 2025-04-03 PROCEDURE — 72148 MRI LUMBAR SPINE W/O DYE: CPT | Mod: 26,,, | Performed by: RADIOLOGY

## 2025-04-03 PROCEDURE — 72148 MRI LUMBAR SPINE W/O DYE: CPT | Mod: TC

## 2025-04-20 DIAGNOSIS — E78.5 DYSLIPIDEMIA: ICD-10-CM

## 2025-04-20 DIAGNOSIS — I10 ESSENTIAL HYPERTENSION: ICD-10-CM

## 2025-04-20 DIAGNOSIS — E11.9 TYPE 2 DIABETES MELLITUS WITHOUT COMPLICATION, WITHOUT LONG-TERM CURRENT USE OF INSULIN: ICD-10-CM

## 2025-04-20 DIAGNOSIS — R20.2 TINGLING IN EXTREMITIES: ICD-10-CM

## 2025-04-20 DIAGNOSIS — J31.0 NONALLERGIC RHINITIS: ICD-10-CM

## 2025-04-20 RX ORDER — SEMAGLUTIDE 1.34 MG/ML
1 INJECTION, SOLUTION SUBCUTANEOUS
Qty: 12 EACH | Refills: 3 | Status: SHIPPED | OUTPATIENT
Start: 2025-04-20

## 2025-04-20 RX ORDER — GABAPENTIN 300 MG/1
300 CAPSULE ORAL NIGHTLY
Qty: 30 CAPSULE | Refills: 11 | Status: SHIPPED | OUTPATIENT
Start: 2025-04-20 | End: 2026-04-20

## 2025-04-20 RX ORDER — FLUTICASONE PROPIONATE 50 MCG
1 SPRAY, SUSPENSION (ML) NASAL DAILY
Qty: 16 G | Refills: 11 | Status: SHIPPED | OUTPATIENT
Start: 2025-04-20

## 2025-04-20 RX ORDER — LISINOPRIL 20 MG/1
20 TABLET ORAL DAILY
Qty: 90 TABLET | Refills: 3 | Status: SHIPPED | OUTPATIENT
Start: 2025-04-20

## 2025-04-20 RX ORDER — ATORVASTATIN CALCIUM 80 MG/1
80 TABLET, FILM COATED ORAL DAILY
Qty: 90 TABLET | Refills: 3 | Status: SHIPPED | OUTPATIENT
Start: 2025-04-20

## 2025-04-20 NOTE — TELEPHONE ENCOUNTER
No care due was identified.  [facility] Embedded Care Due Messages. Reference number: [id number].   4/20/2025 9:30:07 AM CDT

## 2025-04-20 NOTE — TELEPHONE ENCOUNTER
No care due was identified.  [facility] Embedded Care Due Messages. Reference number: [id number].   4/20/2025 9:31:44 AM CDT

## 2025-04-20 NOTE — TELEPHONE ENCOUNTER
No care due was identified.  [facility] Embedded Care Due Messages. Reference number: [id number].   4/20/2025 9:29:40 AM CDT

## 2025-04-20 NOTE — TELEPHONE ENCOUNTER
Care Due:                  Date            Visit Type   Department     Provider  --------------------------------------------------------------------------------                                JUAN JOSE -         [facility] FAMILY MED                              PRIMARY      / INTERNAL MED  Last Visit: 03-      CARE ([facility])   / QUE Yip                              [facility] FAMILY MED                              PRIMARY      / INTERNAL MED  Next Visit: 06-      CARE ([facility])   / QUE Yip                                                            Last  Test          Frequency    Reason                     Performed    Due Date  --------------------------------------------------------------------------------    HBA1C.......  6 months...  OZEMPIC..................  10-   04-    [employer] Embedded Care Due Messages. Reference number: [id number].   4/20/2025 9:28:45 AM CDT

## 2025-04-20 NOTE — TELEPHONE ENCOUNTER
No care due was identified.  [facility] Embedded Care Due Messages. Reference number: [id number].   4/20/2025 9:29:14 AM CDT

## 2025-05-29 ENCOUNTER — PATIENT OUTREACH (OUTPATIENT)
Dept: ADMINISTRATIVE | Facility: HOSPITAL | Age: 65
End: 2025-05-29
Payer: COMMERCIAL

## 2025-06-04 ENCOUNTER — LAB VISIT (OUTPATIENT)
Dept: LAB | Facility: HOSPITAL | Age: 65
End: 2025-06-04
Attending: INTERNAL MEDICINE
Payer: MEDICAID

## 2025-06-04 DIAGNOSIS — Z12.31 OTHER SCREENING MAMMOGRAM: ICD-10-CM

## 2025-06-04 DIAGNOSIS — E11.9 TYPE 2 DIABETES MELLITUS WITHOUT COMPLICATION, WITHOUT LONG-TERM CURRENT USE OF INSULIN: ICD-10-CM

## 2025-06-04 LAB
ALBUMIN SERPL BCP-MCNC: 4.4 G/DL (ref 3.5–5.2)
ALBUMIN/CREAT UR: 4 UG/MG
ALP SERPL-CCNC: 78 UNIT/L (ref 40–150)
ALT SERPL W/O P-5'-P-CCNC: 17 UNIT/L (ref 10–44)
ANION GAP (OHS): 7 MMOL/L (ref 8–16)
AST SERPL-CCNC: 17 UNIT/L (ref 11–45)
BILIRUB SERPL-MCNC: 0.4 MG/DL (ref 0.1–1)
BUN SERPL-MCNC: 10 MG/DL (ref 8–23)
CALCIUM SERPL-MCNC: 9.8 MG/DL (ref 8.7–10.5)
CHLORIDE SERPL-SCNC: 109 MMOL/L (ref 95–110)
CHOLEST SERPL-MCNC: 233 MG/DL (ref 120–199)
CHOLEST/HDLC SERPL: 4.4 {RATIO} (ref 2–5)
CO2 SERPL-SCNC: 25 MMOL/L (ref 23–29)
CREAT SERPL-MCNC: 0.8 MG/DL (ref 0.5–1.4)
CREAT UR-MCNC: 248 MG/DL (ref 15–325)
EAG (OHS): 123 MG/DL (ref 68–131)
ERYTHROCYTE [DISTWIDTH] IN BLOOD BY AUTOMATED COUNT: 11.8 % (ref 11.5–14.5)
GFR SERPLBLD CREATININE-BSD FMLA CKD-EPI: >60 ML/MIN/1.73/M2
GLUCOSE SERPL-MCNC: 87 MG/DL (ref 70–110)
HBA1C MFR BLD: 5.9 % (ref 4–5.6)
HCT VFR BLD AUTO: 37.4 % (ref 37–48.5)
HDLC SERPL-MCNC: 53 MG/DL (ref 40–75)
HDLC SERPL: 22.7 % (ref 20–50)
HGB BLD-MCNC: 11.9 GM/DL (ref 12–16)
LDLC SERPL CALC-MCNC: 164.6 MG/DL (ref 63–159)
MCH RBC QN AUTO: 30 PG (ref 27–31)
MCHC RBC AUTO-ENTMCNC: 31.8 G/DL (ref 32–36)
MCV RBC AUTO: 94 FL (ref 82–98)
MICROALBUMIN UR-MCNC: 10 UG/ML (ref ?–5000)
NONHDLC SERPL-MCNC: 180 MG/DL
PLATELET # BLD AUTO: 253 K/UL (ref 150–450)
PMV BLD AUTO: 11 FL (ref 9.2–12.9)
POTASSIUM SERPL-SCNC: 4 MMOL/L (ref 3.5–5.1)
PROT SERPL-MCNC: 7.9 GM/DL (ref 6–8.4)
RBC # BLD AUTO: 3.97 M/UL (ref 4–5.4)
SODIUM SERPL-SCNC: 141 MMOL/L (ref 136–145)
TRIGL SERPL-MCNC: 77 MG/DL (ref 30–150)
WBC # BLD AUTO: 5.31 K/UL (ref 3.9–12.7)

## 2025-06-04 PROCEDURE — 36415 COLL VENOUS BLD VENIPUNCTURE: CPT | Mod: PO

## 2025-06-04 PROCEDURE — 84155 ASSAY OF PROTEIN SERUM: CPT

## 2025-06-04 PROCEDURE — 82465 ASSAY BLD/SERUM CHOLESTEROL: CPT

## 2025-06-04 PROCEDURE — 85027 COMPLETE CBC AUTOMATED: CPT

## 2025-06-04 PROCEDURE — 82570 ASSAY OF URINE CREATININE: CPT

## 2025-06-04 PROCEDURE — 83036 HEMOGLOBIN GLYCOSYLATED A1C: CPT

## 2025-06-09 ENCOUNTER — PATIENT OUTREACH (OUTPATIENT)
Dept: ADMINISTRATIVE | Facility: HOSPITAL | Age: 65
End: 2025-06-09
Payer: MEDICAID

## 2025-06-09 ENCOUNTER — PATIENT MESSAGE (OUTPATIENT)
Dept: ADMINISTRATIVE | Facility: HOSPITAL | Age: 65
End: 2025-06-09
Payer: MEDICAID

## 2025-06-10 NOTE — PROGRESS NOTES
This note was created by combination of typed  and M-Modal dictation.  Transcription errors may be present.   This note was also generated with the assistance of ambient listening technology. Verbal consent was obtained by the patient and accompanying visitor(s) for the recording of patient appointment to facilitate this note. I attest to having reviewed and edited the generated note for accuracy, though some syntax or spelling errors may persist. Please contact the author of this note for any clarification.    Assessment and Plan:   Assessment and Plan    Assessment & Plan  Type 2 diabetes mellitus without complication, without long-term current use of insulin hx metformin with GI SE; saxagliptin expensive  Pre visit labs A1c good on Ozempic no changes  Orders:    Comprehensive Metabolic Panel; Future    CBC Without Differential; Future    Hemoglobin A1C; Future    Essential hypertension hx of hctz, amlodipine, lisinopril, propranolol  Borderline readings.  In pain.  Monitor.  On lisinopril mid range has room to increase if needed       Dyslipidemia hx of crestor, lipitor  Notes atorvastatin seems to give her nausea.  Pre visit labs lipid high.  She reports she has been taking it regularly.  Listed history of Crestor though it is unclear when she had it and if she had any issues.  Will challenge her on rosuvastatin 40.  Orders:    rosuvastatin (CRESTOR) 40 MG Tab; Take 1 tablet (40 mg total) by mouth every evening.    Comprehensive Metabolic Panel; Future    CBC Without Differential; Future    Lipid Panel; Future    Gastroesophageal reflux disease without esophagitis  Stable       Chronic constipation  Urinary frequency  She tells me that her gynecologist discussed the possibility that this could be secondary to GI as she is having right inguinal pain.  She notes that she is generally chronically constipated, has not been taking MiraLax all that regularly and feels like it takes a couple of days to work.     I have recommended she rechallenge on MiraLax twice daily scheduled.  But if that fails to work I have sent in a prescription for Linzess.    She has got urinary frequency, was prescribed Myrbetriq.  Has not had a refill because of issues with insurance though she just changed her insurance.  But let us see if regular bowel movements will help out with urinary frequency and if so maybe able to stay off of any bladder medication  Orders:    linaCLOtide (LINZESS) 72 mcg Cap capsule; Take 1 capsule (72 mcg total) by mouth before breakfast.    Greater trochanteric bursitis of right hip  Right hip pain  Had MRI L-spine which showed mild spondylosis.  EMG showed no nerve conduction abnormalities.  She is complaining mainly of significant pain in the outer hip but also in the inguinal area.  Suspicious for hip pathology.  Check x-ray hips.  Referral to Orthopedics.  She was given Lyrica 50 b.i.d. by outside neurology and did not really find it helpful.  Will try her with higher dose 75 b.i.d.  Orders:    X-Ray Hips Bilateral 2 View Inc AP Pelvis; Future    Ambulatory referral/consult to Orthopedics; Future    pregabalin (LYRICA) 75 MG capsule; Take 1 capsule (75 mg total) by mouth 2 (two) times daily.    Tingling in extremities  Neovascular glaucoma of left eye, moderate stage  Extremity tingling, seen by [facility] well as [place] [facility].  Labs done in January, B12 was very normal, TSH was normal, SPEP and UPEP were normal.  EMG of the lower extremities was normal.  I suspect that this is from the Diamox though she tells me that this may have started prior to the Diamox.  Lyrica as above  Orders:    pregabalin (LYRICA) 75 MG capsule; Take 1 capsule (75 mg total) by mouth 2 (two) times daily.        Medications Discontinued During This Encounter   Medication Reason    moxifloxacin (VIGAMOX) 0.5 % ophthalmic solution     gabapentin (NEURONTIN) 300 MG capsule Alternate therapy    atorvastatin (LIPITOR) 80 MG  tablet Alternate therapy    brimonidine-timoloL (COMBIGAN) 0.2-0.5 % Drop Therapy completed       meds sent this encounter:  Medications Ordered This Encounter   Medications    linaCLOtide (LINZESS) 72 mcg Cap capsule     Sig: Take 1 capsule (72 mcg total) by mouth before breakfast.     Dispense:  30 capsule     Refill:  2    pregabalin (LYRICA) 75 MG capsule     Sig: Take 1 capsule (75 mg total) by mouth 2 (two) times daily.     Dispense:  60 capsule     Refill:  2    rosuvastatin (CRESTOR) 40 MG Tab     Sig: Take 1 tablet (40 mg total) by mouth every evening.     Dispense:  90 tablet     Refill:  3     Stop lipitor         Follow Up:  Follow up 3 months, on change from atorvastatin to rosuvastatin, on higher dose Lyrica, on new start Linzess, hopefully in the interim to have seen orthopedics  No future appointments.        Subjective:   Subjective   Chief Complaint   Patient presents with    Follow-up       HPI  Torsten is a 64 y.o. female.    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     Employer: [employer]   Tobacco Use    Smoking status: Never    Smokeless tobacco: Never   Substance and Sexual Activity    Alcohol use: Yes     Comment: socially    Drug use: Never     Social Drivers of Health     Financial Resource Strain: Low Risk  (3/21/2025)    Received from [facility]    Overall Financial Resource Strain (CARDIA)     Difficulty of Paying Living Expenses: Not hard at all   Food Insecurity: No Food Insecurity (3/21/2025)    Received from [facility]    Hunger Vital Sign     Worried About Running Out of Food in the Last Year: Never true     Ran Out of Food in the Last Year: Never true   Transportation Needs: No Transportation Needs (3/21/2025)    Received from [facility]    PRAPARE - Transportation     Lack of Transportation (Medical): No     Lack of Transportation (Non-Medical): No    Physical Activity: Insufficiently Active (3/21/2025)    Received from [facility]    Exercise Vital Sign     Days of Exercise per Week: 2 days     Minutes of Exercise per Session: 10 min   Stress: No Stress Concern Present (3/21/2025)    Received from [facility]    [place] [place] [employer] - Occupational Stress Questionnaire     Feeling of Stress : Only a little   Recent Concern: Stress - Stress Concern Present (3/19/2025)    [employer] [place] [employer] - Occupational Stress Questionnaire     Feeling of Stress : To some extent   Housing Stability: Unknown (3/21/2025)    Received from [facility]    Housing Stability Vital Sign     Unable to Pay for Housing in the Last Year: No       No LMP recorded (lmp unknown). Patient has had a hysterectomy.    Last appointment with this clinic was 3/26/2025. Last visit with me 3/18/2025   To summarize last visit and events leading up to today:  Diabetes.  Trulicity possible side effect of fatigue.  Constipation which preceded Trulicity.  Offered to change, she ultimately decided to stay on Trulicity.  Want her to reestablish with digital monitoring.  Hypertension, reestablish with digital monitoring. with 40 lb weight loss I suspect her sensation of dizziness and lightheadedness are from low BP.  Stop HCTZ.  Stay on lisinopril 20.  Hyperlipidemia statin.  Anemia subsequent diagnosis of H pylori  12/19/23 colonoscopy normal repeat 10 years  12/19/23 EGD normal stomach, duodenum.  Path showing Brunner's gland hyperplasia.  H pylori positive  Saw GI 3/19/24. Plan is stool h pylori  Subsequent stool H pylori negative.  Saw ophtho 4/24/24. Central retinal vein occlusion L eye      Last visit me 10/16/2024   Acute low back pain trial of ibuprofen with Flexeril  Diabetes pre visit labs A1c stable on Ozempic 1 mg.  Was previously on Trulicity changed to Ozempic possible side effect of fatigue?  Nausea which is relieved with Zofran  Hyperlipidemia increase  atorvastatin to 80  Chronic constipation preceding GLP1.  Has tried a number stool softeners without lasting relief.  Taking MiraLax regularly.  Next would be Linzess.  Up-to-date colonoscopy, negative  Dyspnea with upcoming cardiology consult  Snoring fatigue ordered HST     11/25/2024 ED for eye pain treated for acute angle closure glaucoma, intra-ocular injections given in the ED     12/7 ED visit.  Complaining of generalized itching, no visible rash on documented exam.  Seems to have coincided with starting acetazolamide and atropine drops.  Labs CMP CO2 low expected.  Potassium low.  Creatinine 1.1 from 0.8  CBC mild anemia  Urinalysis trace protein leukocytes positive microscopic with pyuria  Urine culture growing out E coli sensitive to Macrobid which was given on discharge     12/10/24 e visit for nausea. Tingling and numbness.  Suspect the tingling and numbness is a side effect of the acetazolamide.  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  She has had issues with nausea even preceding the Ozempic but it could be worsening it.  She would be agreeable for trial of lower dose.  Decrease Ozempic from 1 mg down to 0.5 mg     Evisit, sounds like she lost insurance and canot afford ozempic.  History of metformin with GI SE  Last A1c good  Would stop meds monitor glucose and if climbing start glipizide xl 5     01/15/2025 saw  family doctors due to insurance change.  Had complained of numbness and tingling   SPEP and UPEP normal   Urine microalbumin normal   B12 normal   TSH normal   A1c 5.6  RPR negative   HIV negative  ESR and CRP negative  CMP normal   CBC mild anemia hemoglobin 11     Last visit with me 03/10/2025   Diabetes back on Ozempic.  Now off of glipizide.  Followed by Ophthalmology.  A1c most recent was good.    Hypertension, hyperlipidemia stable   GERD Pepcid p.r.n.    Extremity tingling which seemed to have preceded Diamox.  Labs SPEP UPEP B12 TSH all normal.  Referred to Neurology.  Trial of gabapentin.    Subsequently seen by Neurology 03/19/2025.  Suspicious for radiculopathy and ordered MRI L-spine.  But also ordered EMG     3/21/25 e visit for diarrhea blood in stool. Had EGD and colonoscopy 12/2023 essentially normal. Can not rule out inflammatory causes or bacterial infection. Would hold off on antidiarrheals, will order Zofran for nausea but if possible I have asked her to come in to drop off stool sample to check for infection   Patient complaining of diarrhea with blood    She saw Neurology for virtual visit 03/19/2025 for numbness and tingling.  Plan is MRI L-spine and EMG and PT for sciatica type symptoms    She then saw [facility] 03/21/2025 for the same thing.  Plan was change gabapentin to Lyrica, check MRI L-spine and EMG    04/03/2025 MRI L-spine  1. Mild lumbar spondylosis.  No spinal canal stenosis or high-grade neural foraminal narrowing.     04/15/2025 EMG done with [facility], normal EMG/NCS of the bilateral lower extremities    Saw primary care with [facility] 04/29/2025.  Stable no changes.  Ozempic 1 mg    05/05/2025 saw Urology for urinary frequency.  Plan is urine culture, timed voiding, avoid anticholinergics.  Trial of Myrbetriq    06/04/2025 labs   CMP normal   CBC mild anemia borderline   A1c 5.9  Lipid high   Microalbumin normal    Today's visit:    History of Present Illness    HPI:  Torsten reports pain in her right leg, particularly in the hip area and extending down to her ankle and knee. She describes the sensation as feeling like a deep bruise or subcutaneous bruising, with tenderness to touch. The pain is exacerbated when walking up and down stairs, but not on level ground. She also mentions back pain, for which she is currently using a patch and has applied various topical treatments such as Juliaetta Bernardston, Rosa's, and BioFreeze.  She recalls a sudden onset of back pain while on the phone recently, describing it as an acute, intense sensation.    Torsten reports intermittent numbness and tingling in her fingers and toes. She rates the current severity as 4/10, noting that it has a rapid onset but is not persistent. She also mentions pains in her groin area for several days.    Regarding urinary symptoms, she was prescribed Myrbetriq by a urologist at [facility] for frequent urination, but has not been able to refill the medication.    Torsten reports severe constipation, having a bowel movement only about once a week. This is occasionally followed by episodes of severe diarrhea accompanied by stomach cramps. She has tried various OTC remedies including Miralax (taken twice daily for about 4 days), Dulcolax, and enemas, with limited success.    She underwent eye surgery on February 13th and is currently taking eye drops daily. She previously took Gabapentin and Lyrica for her tingling and numbness symptoms, but reports that neither medication provided significant relief.    Recent medical evaluations include being evaluated by a urologist at [facility], undergoing an MRI of her back which showed mild arthritis but no significant spinal narrowing, and having an EMG in mid-April which did not reveal any nerve conduction problems. She also had a vaginal ultrasound performed by her OBGYN, which did not show any abnormalities.    She denies pain in her left leg.    MEDICATIONS:  - Lipitor (atorvastatin), taken nightly for cholesterol, causes nausea  - Lisinopril for blood pressure  - Ozempic 1 mg for blood sugar  - Diamox (acetazolamide), twice daily for eyes, may be causing numbness  - Eye drops, daily  - Miralax, twice daily for constipation  - Biofreeze, applied topically for pain  - Tiger Balm, applied topically for pain  - Rosa's, applied topically for pain  - Discontinued Gabapentin, did not help  - Discontinued Lyrica (pregabalin), did not  help  - Discontinued Myrbetriq for bladder issues  - Discontinued bromo Timolol eye drops after eye surgery on February 13th    ROS:  Gastrointestinal: reports abdominal pain, reports nausea, reports vomiting, reports diarrhea, reports constipation  Genitourinary: reports excessive urination  Musculoskeletal: reports joint pain, reports limb pain, reports pain with movement, reports back pain, reports pelvic pain  Neurological: reports numbness, reports restless legs         Patient Care Team:  Shannan Frazier MD as PCP - General (Family Medicine)      Patient Active Problem List    Diagnosis Date Noted    Pseudophakia of both eyes 12/05/2024    Central retinal vein occlusion with macular edema of left eye 12/05/2024    Neovascular glaucoma of left eye, moderate stage 12/05/2024    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  Path showing Brunner's gland hyperplasia.  H pylori positive      Screening for colon cancer 12/19/23 colonoscopy normal repeat 10 years 06/01/2020 11/21/2016 colonoscopy normal repeat 10 years.  12/19/23 colonoscopy normal repeat 10 years      Primary insomnia hx of melatonin without relief; hx of trazodone 03/13/2020    GERD (gastroesophageal reflux disease) 01/29/2016    Dyslipidemia hx of crestor, lipitor 10/19/2015    Headache chronic with hx of topamax hx of propranolol 10/19/2015    Essential hypertension hx of hctz, amlodipine, lisinopril, propranolol 11/20/2014    Type 2 diabetes mellitus without complication, without long-term current use of insulin hx metformin with GI SE; saxagliptin expensive 11/20/2014       PAST MEDICAL PROBLEMS, PAST SURGICAL HISTORY: please see relevant portions of the electronic medical record    ALLERGIES AND MEDICATIONS: updated and  reviewed.  Medication List with Changes/Refills   Current Medications    ACETAZOLAMIDE (DIAMOX) 500 MG CPSR    Take 1 capsule by mouth 2 (two) times daily.    ATORVASTATIN (LIPITOR) 80 MG TABLET    Take 1 tablet (80 mg total) by mouth once daily.    ATROPINE 1% (ISOPTO ATROPINE) 1 % DROP    Place 1 drop into the left eye 2 (two) times a day.    BLOOD SUGAR DIAGNOSTIC STRP    To check BG 1 times daily, to use with insurance preferred meter    BLOOD-GLUCOSE METER KIT    To check BG 1 times daily, to use with insurance preferred meter    BRIMONIDINE-TIMOLOL (COMBIGAN) 0.2-0.5 % DROP    Place 1 drop into the left eye 3 (three) times daily.    CETIRIZINE (ZYRTEC) 10 MG CAP    Take 10 mg by mouth Daily.    CICLOPIROX (PENLAC) 8 % SOLN    Apply topically nightly.    FAMOTIDINE (PEPCID) 20 MG TABLET    Take 1 tablet (20 mg total) by mouth 1 (one) time if needed (itching).    FLUTICASONE PROPIONATE (FLONASE) 50 MCG/ACTUATION NASAL SPRAY    1 spray (50 mcg total) by Each Nostril route once daily.    GABAPENTIN (NEURONTIN) 300 MG CAPSULE    Take 1 capsule (300 mg total) by mouth every evening.    LANCETS MISC    To check BG 1 times daily, to use with insurance preferred meter    LISINOPRIL (PRINIVIL,ZESTRIL) 20 MG TABLET    Take 1 tablet (20 mg total) by mouth once daily.    MECLIZINE (ANTIVERT) 25 MG TABLET    Take 1 tablet (25 mg total) by mouth 3 (three) times daily as needed for Dizziness.    MIRABEGRON (MYRBETRIQ) 50 MG TB24    Take 1 tablet by mouth.    MOXIFLOXACIN (VIGAMOX) 0.5 % OPHTHALMIC SOLUTION    Place into both eyes.    NEOMYCIN-POLYMYXIN-DEXAMETHASONE (DEXACINE) 3.5 MG/G-10,000 UNIT/G-0.1 % OINT    Apply 0.5 inches to eye.    ONDANSETRON (ZOFRAN-ODT) 8 MG TBDL    Take 1 tablet (8 mg total) by mouth every 8 (eight) hours as needed.    OZEMPIC 1 MG/DOSE (4 MG/3 ML)    Inject 1 mg into the skin every 7 days.         Objective:   Objective   Physical Exam   Vitals:    06/11/25 0905   BP: (!) 140/80   BP Location:  "Right arm   Patient Position: Sitting   Pulse: 65   SpO2: 99%   Weight: 73.8 kg (162 lb 11.2 oz)   Height: 5' 6" (1.676 m)    Body mass index is 26.26 kg/m².  Weight: 73.8 kg (162 lb 11.2 oz)   Height: 5' 6" (167.6 cm)     Physical Exam  Constitutional:       General: She is not in acute distress.     Appearance: She is well-developed.   Eyes:      Extraocular Movements: Extraocular movements intact.   Cardiovascular:      Rate and Rhythm: Normal rate and regular rhythm.      Heart sounds: Normal heart sounds. No murmur heard.  Pulmonary:      Effort: Pulmonary effort is normal.      Breath sounds: Normal breath sounds.   Musculoskeletal:         General: Tenderness present. Normal range of motion.      Right lower leg: No edema.      Left lower leg: No edema.      Comments: She is tender over the right greater trochanter but she also notes discomfort on the lateral side of her thigh without induration or deformity   The knee is unremarkable with patella non ballotable, the knee joints are unremarkable nontender but she notes tenderness on palpation of the distal medial femur.  With external rotation she notes pain in that area  With hip inversion she notes pain on the lateral side and pain on the inside with eversion   Skin:     General: Skin is warm and dry.   Neurological:      Mental Status: She is alert and oriented to person, place, and time.      Coordination: Coordination normal.   Psychiatric:         Behavior: Behavior normal.         Thought Content: Thought content normal.                "

## 2025-06-11 ENCOUNTER — OFFICE VISIT (OUTPATIENT)
Dept: FAMILY MEDICINE | Facility: CLINIC | Age: 65
End: 2025-06-11
Payer: MEDICAID

## 2025-06-11 ENCOUNTER — HOSPITAL ENCOUNTER (OUTPATIENT)
Dept: RADIOLOGY | Facility: HOSPITAL | Age: 65
Discharge: HOME | End: 2025-06-11
Attending: INTERNAL MEDICINE
Payer: MEDICAID

## 2025-06-11 VITALS
BODY MASS INDEX: 26.14 KG/M2 | OXYGEN SATURATION: 99 % | SYSTOLIC BLOOD PRESSURE: 138 MMHG | HEIGHT: 66 IN | DIASTOLIC BLOOD PRESSURE: 74 MMHG | HEART RATE: 65 BPM | WEIGHT: 162.69 LBS

## 2025-06-11 DIAGNOSIS — R20.2 TINGLING IN EXTREMITIES: ICD-10-CM

## 2025-06-11 DIAGNOSIS — E11.9 TYPE 2 DIABETES MELLITUS WITHOUT COMPLICATION, WITHOUT LONG-TERM CURRENT USE OF INSULIN: Primary | ICD-10-CM

## 2025-06-11 DIAGNOSIS — K21.9 GASTROESOPHAGEAL REFLUX DISEASE WITHOUT ESOPHAGITIS: ICD-10-CM

## 2025-06-11 DIAGNOSIS — K59.09 CHRONIC CONSTIPATION: ICD-10-CM

## 2025-06-11 DIAGNOSIS — E78.5 DYSLIPIDEMIA: ICD-10-CM

## 2025-06-11 DIAGNOSIS — R35.0 URINARY FREQUENCY: ICD-10-CM

## 2025-06-11 DIAGNOSIS — M25.551 RIGHT HIP PAIN: ICD-10-CM

## 2025-06-11 DIAGNOSIS — H40.52X2 NEOVASCULAR GLAUCOMA OF LEFT EYE, MODERATE STAGE: ICD-10-CM

## 2025-06-11 DIAGNOSIS — M70.61 GREATER TROCHANTERIC BURSITIS OF RIGHT HIP: ICD-10-CM

## 2025-06-11 DIAGNOSIS — I10 ESSENTIAL HYPERTENSION: ICD-10-CM

## 2025-06-11 PROCEDURE — 99215 OFFICE O/P EST HI 40 MIN: CPT | Mod: PBBFAC,PO | Performed by: INTERNAL MEDICINE

## 2025-06-11 PROCEDURE — 1160F RVW MEDS BY RX/DR IN RCRD: CPT | Mod: CPTII,,, | Performed by: INTERNAL MEDICINE

## 2025-06-11 PROCEDURE — G2211 COMPLEX E/M VISIT ADD ON: HCPCS | Mod: ,,, | Performed by: INTERNAL MEDICINE

## 2025-06-11 PROCEDURE — 99999 PR PBB SHADOW E&M-EST. PATIENT-LVL V: CPT | Mod: PBBFAC,,, | Performed by: INTERNAL MEDICINE

## 2025-06-11 PROCEDURE — 3044F HG A1C LEVEL LT 7.0%: CPT | Mod: CPTII,,, | Performed by: INTERNAL MEDICINE

## 2025-06-11 PROCEDURE — 3078F DIAST BP <80 MM HG: CPT | Mod: CPTII,,, | Performed by: INTERNAL MEDICINE

## 2025-06-11 PROCEDURE — 1159F MED LIST DOCD IN RCRD: CPT | Mod: CPTII,,, | Performed by: INTERNAL MEDICINE

## 2025-06-11 PROCEDURE — 3075F SYST BP GE 130 - 139MM HG: CPT | Mod: CPTII,,, | Performed by: INTERNAL MEDICINE

## 2025-06-11 PROCEDURE — 4010F ACE/ARB THERAPY RXD/TAKEN: CPT | Mod: CPTII,,, | Performed by: INTERNAL MEDICINE

## 2025-06-11 PROCEDURE — 3061F NEG MICROALBUMINURIA REV: CPT | Mod: CPTII,,, | Performed by: INTERNAL MEDICINE

## 2025-06-11 PROCEDURE — 3008F BODY MASS INDEX DOCD: CPT | Mod: CPTII,,, | Performed by: INTERNAL MEDICINE

## 2025-06-11 PROCEDURE — 3066F NEPHROPATHY DOC TX: CPT | Mod: CPTII,,, | Performed by: INTERNAL MEDICINE

## 2025-06-11 PROCEDURE — 73521 X-RAY EXAM HIPS BI 2 VIEWS: CPT | Mod: 26,,, | Performed by: RADIOLOGY

## 2025-06-11 PROCEDURE — 99214 OFFICE O/P EST MOD 30 MIN: CPT | Mod: S$PBB,,, | Performed by: INTERNAL MEDICINE

## 2025-06-11 PROCEDURE — 73521 X-RAY EXAM HIPS BI 2 VIEWS: CPT | Mod: TC,FY,PO

## 2025-06-11 RX ORDER — MIRABEGRON 50 MG/1
1 TABLET, FILM COATED, EXTENDED RELEASE ORAL
COMMUNITY
Start: 2025-05-05 | End: 2025-08-03

## 2025-06-11 RX ORDER — ROSUVASTATIN CALCIUM 40 MG/1
40 TABLET, COATED ORAL NIGHTLY
Qty: 90 TABLET | Refills: 3 | Status: SHIPPED | OUTPATIENT
Start: 2025-06-11 | End: 2026-06-11

## 2025-06-11 RX ORDER — ACETAZOLAMIDE 500 MG/1
1 CAPSULE, EXTENDED RELEASE ORAL 2 TIMES DAILY
COMMUNITY
Start: 2025-01-09

## 2025-06-11 RX ORDER — PREGABALIN 75 MG/1
75 CAPSULE ORAL 2 TIMES DAILY
Qty: 60 CAPSULE | Refills: 2 | Status: SHIPPED | OUTPATIENT
Start: 2025-06-11 | End: 2025-12-10

## 2025-06-11 NOTE — ASSESSMENT & PLAN NOTE
Borderline readings.  In pain.  Monitor.  On lisinopril mid range has room to increase if needed

## 2025-06-11 NOTE — PATIENT INSTRUCTIONS
If you have not been contacted in a week about your referral, please call my Referrals Coordinator at [phone]

## 2025-06-11 NOTE — ASSESSMENT & PLAN NOTE
Extremity tingling, seen by [facility] as well as [facility].  Labs done in January, B12 was very normal, TSH was normal, SPEP and UPEP were normal.  EMG of the lower extremities was normal.  I suspect that this is from the Diamox though she tells me that this may have started prior to the Diamox.  Lyrica as above  Orders:    pregabalin (LYRICA) 75 MG capsule; Take 1 capsule (75 mg total) by mouth 2 (two) times daily.

## 2025-06-11 NOTE — ASSESSMENT & PLAN NOTE
Extremity tingling, seen by [facility] as well as [facility].  Labs done in January, B12 was very normal, TSH was normal, SPEP and UPEP were normal.  EMG of the lower extremities was normal.  I suspect that this is from the Diamox though she tells me that this may have started prior to the Diamox.  Lyrica as above  Orders:    pregabalin (LYRICA) 75 MG capsule; Take 1 capsule (75 mg total) by mouth 2 (two) times daily.

## 2025-06-11 NOTE — ASSESSMENT & PLAN NOTE
Pre visit labs A1c good on Ozempic no changes  Orders:    Comprehensive Metabolic Panel; Future    CBC Without Differential; Future    Hemoglobin A1C; Future

## 2025-06-11 NOTE — ASSESSMENT & PLAN NOTE
Notes atorvastatin seems to give her nausea.  Pre visit labs lipid high.  She reports she has been taking it regularly.  Listed history of Crestor though it is unclear when she had it and if she had any issues.  Will challenge her on rosuvastatin 40.  Orders:    rosuvastatin (CRESTOR) 40 MG Tab; Take 1 tablet (40 mg total) by mouth every evening.    Comprehensive Metabolic Panel; Future    CBC Without Differential; Future    Lipid Panel; Future

## 2025-06-12 ENCOUNTER — OFFICE VISIT (OUTPATIENT)
Facility: CLINIC | Age: 65
End: 2025-06-12
Payer: MEDICAID

## 2025-06-12 VITALS
BODY MASS INDEX: 26.24 KG/M2 | SYSTOLIC BLOOD PRESSURE: 151 MMHG | HEART RATE: 56 BPM | WEIGHT: 163.25 LBS | DIASTOLIC BLOOD PRESSURE: 70 MMHG | HEIGHT: 66 IN

## 2025-06-12 DIAGNOSIS — M70.61 GREATER TROCHANTERIC BURSITIS OF RIGHT HIP: Primary | ICD-10-CM

## 2025-06-12 DIAGNOSIS — M25.551 RIGHT HIP PAIN: ICD-10-CM

## 2025-06-12 PROCEDURE — 99214 OFFICE O/P EST MOD 30 MIN: CPT | Mod: PBBFAC | Performed by: PHYSICIAN ASSISTANT

## 2025-06-12 PROCEDURE — 20610 DRAIN/INJ JOINT/BURSA W/O US: CPT | Mod: PBBFAC | Performed by: PHYSICIAN ASSISTANT

## 2025-06-12 PROCEDURE — 99999 PR PBB SHADOW E&M-EST. PATIENT-LVL IV: CPT | Mod: PBBFAC,,, | Performed by: PHYSICIAN ASSISTANT

## 2025-06-12 PROCEDURE — 99999PBSHW PR PBB SHADOW TECHNICAL ONLY FILED TO HB: Mod: PBBFAC,,,

## 2025-06-12 RX ORDER — TRIAMCINOLONE ACETONIDE 40 MG/ML
40 INJECTION, SUSPENSION INTRA-ARTICULAR; INTRAMUSCULAR
Status: COMPLETED | OUTPATIENT
Start: 2025-06-12 | End: 2025-06-12

## 2025-06-12 RX ADMIN — TRIAMCINOLONE ACETONIDE 40 MG: 40 INJECTION, SUSPENSION INTRA-ARTICULAR; INTRAMUSCULAR at 08:06

## 2025-06-12 NOTE — PROGRESS NOTES
Subjective:      Patient ID: Torsten Trevizo is a 64 y.o. female.    Chief Complaint: Pain of the Right Hip and Pain      HPI:     History of Present Illness    CHIEF COMPLAINT:  - Left hip pain    HPI:  Torsten presents with complaints of bone pain in the hip and groin area, which started approximately 5-6 months ago and has since increased in intensity. The pain radiates from the groin up to the hip and down the leg.She is currently seen Neuro for this issue. She describes a sensation of bruising under the skin when lying on her right side over Gr troch region. Pain is worse when driving and prevents her from lying on the affected side at night. She reports the pain is tender to touch and describes it as an inner bruise.Denies any trauma,fever,chills or swelling.    She has seen multiple healthcare providers for this issue. A neurologist performed a nerve test, which came back normal. We noted some inflammation and possible arthritis on imaging. A vaginal ultrasound was performed to rule out ovarian issues, and bowel involvement was also considered. She is currently taking hydrocodone for pain management.    She denies fevers, chills, redness, or swelling. She denies any previous hip issues or trauma or falls related to the hip pain.    PREVIOUS TREATMENTS:  - Nerve test  - Vaginal ultrasound    MEDICATIONS:  - Hydrocodone    ALLERGIES:  - Hydrocodone: Causes hives    WORK STATUS:  - Driving exacerbates pain      ROS:  Constitutional: -fevers, -chills  Musculoskeletal: -joint swelling, +bone pain, +limb pain, -limb swelling, +pain with movement              PAST MEDICAL HISTORY:    Past Medical History:   Diagnosis Date    Cataract     Diabetes mellitus     does not take any meds for same    High cholesterol     Hypertension     Neovascular glaucoma of left eye, moderate stage 12/5/2024     PAST SURGICAL HISTORY:    Past Surgical History:   Procedure Laterality Date    CATARACT EXTRACTION W/  INTRAOCULAR LENS  IMPLANT Right 2021    Dr. Ferreira     SECTION      CHOLECYSTECTOMY      COLONOSCOPY N/A 2023    Procedure: COLONOSCOPY;  Surgeon: Ramón Upton MD;  Location: [facility] ENDOSCOPY;  Service: Endoscopy;  Laterality: N/A;  WLMeds / Referral: PEG AMBRIZ / COLTEN /  Instructions portal -   - instructions to email [email or web address]), pre call complete.  DBM    ESOPHAGOGASTRODUODENOSCOPY N/A 2023    Procedure: EGD (ESOPHAGOGASTRODUODENOSCOPY);  Surgeon: Ramón Upton MD;  Location: [facility] ENDOSCOPY;  Service: Endoscopy;  Laterality: N/A;    EXTRACTION OF CATARACT Left 10/14/2021    Procedure: EXTRACTION, CATARACT;  Surgeon: Mina Ferreira MD;  Location: [facility] OR;  Service: Ophthalmology;  Laterality: Left;  RN Phone Pre Op. 10-6-21. Covid NEGATIVE 10-13-21. Arrival 06:00 am.  [place]    EYE SURGERY Right     catartact    HYSTERECTOMY      Age 53, Fibroids    INTRAOCULAR PROSTHESES INSERTION Right 2021    Procedure: INSERTION, IOL PROSTHESIS;  Surgeon: Mina Ferreira MD;  Location: [facility] OR;  Service: Ophthalmology;  Laterality: Right;    INTRAOCULAR PROSTHESES INSERTION Left 10/14/2021    Procedure: INSERTION, IOL PROSTHESIS;  Surgeon: Mina Ferreira MD;  Location: [facility] OR;  Service: Ophthalmology;  Laterality: Left;    PHACOEMULSIFICATION OF CATARACT Right 2021    Procedure: PHACOEMULSIFICATION, CATARACT;  Surgeon: Mina Ferreira MD;  Location: [facility] OR;  Service: Ophthalmology;  Laterality: Right;  RN Phone Pre Op 21. Covid NEGATIVE ON  21..  Arrival 05:30 am.  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     Employer: [employer]   Tobacco Use    Smoking status: Never    Smokeless tobacco: Never   Substance and Sexual Activity    Alcohol use: Yes     Comment: socially    Drug use: Never    Sexual activity: Not on file      MEDICATIONS: Current Medications[1]  ALLERGIES:   Review of patient's allergies indicates:   Allergen Reactions    Oxycodone-acetaminophen Hives and Itching       Review of Systems:  Constitution: Negative for chills, fever and night sweats.   HENT: Negative for congestion and headaches.    Eyes: Negative for blurred vision or vision loss.  Cardiovascular: Negative for chest pain and syncope.   Respiratory: Negative for cough and shortness of breath.    Endocrine: Negative for polydipsia, polyphagia and polyuria.   Hematologic/Lymphatic: Negative for bleeding problem. Does not bruise/bleed easily.   Skin: Negative for dry skin, itching and rash.   Musculoskeletal: See HPI.   Gastrointestinal: Negative for abdominal pain and bowel incontinence.   Genitourinary: Negative for bladder incontinence and nocturia.   Neurological: Negative for disturbances in coordination, loss of balance and seizures.   Psychiatric/Behavioral: Negative for depression. The patient does not have insomnia.    Allergic/Immunologic: Negative for hives and persistent infections.        Objective:      Vitals:    06/12/25 0753   BP: (!) 151/70   Pulse: (!) 56       PHYSICAL EXAM:  General: Alert & oriented x3, well-developed and well-nourished, in no acute distress, sitting comfortably in the exam room.  Skin: Warm and dry. Capillary refill less than 2 seconds.   Head: Normocephalic and atraumatic.   Eyes: Sclera appear normal.   Nose: No deformities seen.   Ears:  No deformities seen.   Neck: No tracheal deviation present.   Pulmonary/Chest: Breathing unlabored.   Neurological: Alert and oriented to person, place, and time.   Psychiatric: Mood is pleasant and affect appropriate.     right HIP EXAMINATION     OBSERVATION / INSPECTION  Gait:   Nonantalgic   Alignment:  Neutral   Scars:   None   Muscle atrophy: None   Effusion:  None   Warmth:  None   Leg lengths:   Equal     TENDERNESS       Trochanteric bursa   -   Piriformis    -   SI joint    -   Psoas tendon   -   Rectus insertion  -   Adductor insertion  -   Pubic symphysis  -     ROM: (* = pain)    Flexion:    120 degrees  External rotation: 40 degrees  Internal rotation with axial load: 30 degrees  Internal rotation without axial load: 40 degrees  Abduction:  45 degrees  Adduction:   20 degrees    SPECIAL TESTS:  Pain w/ forced internal rotation (FADIR): Negative   Pain w/ forced external rotation (ERIC): Negative   Circumduction test:    Negative   Stinchfield test:    Negative   Log roll:      Negative   Snapping hip (internal):   Negative   Sit-up pain:     Negative   Resisted sit-up pain:    Negative   Trendelenburg test:    Negative       EXTREMITY NEURO-VASCULAR EXAMINATION:   Sensation:  Grossly intact to light touch all dermatomal regions.   Motor Function:  Fully intact motor function at hip, knee, foot and ankle    DTRs;  quadriceps and  achilles 2+.  No clonus and downgoing Babinski.    Vascular status:  DP and PT pulses 2+, brisk capillary refill, symmetric.    Skin: intact, compartments soft.      Imaging:   Xrays reviewed of B/L hip xrays from 6/11/25--Mild DJD b/l hips,no fractures or dislocation-no significanr effusion        Assessment:       1. Greater trochanteric bursitis of right hip    2. Right hip pain        Plan:            I made the decision to obtain old records of the patient including previous notes and imaging. New imaging was ordered today of the extremity or extremities evaluated. I  independently reviewed and interpreted the radiographs and/or MRIs/CT scan today as well as prior imaging. Reviewed imaging in detail with patient.     I explained the nature of the problem to the patient.     I discussed at length with the patient all the different treatment options available for her right hip including anti-inflammatories, acetaminophen, rest, ice, heat, physical therapy, and corticosteroid injections. I explained the potential role of surgery in the treatment of this condition. The patient understands that if non-surgical measures do not adequately control symptoms, surgery will be considered in the future.     A/P  =Right Hip Greater Troch Bursitis  =Patient wants a steroid injection today for pain relief--D/W patient that she must monitor her blood sugar for the next few days  =Patient will f/u with General Ortho In approx 3 weeks to see if any improvement in pain    Injection Procedure Note   Prior to procedure the correct patient, procedure, and site was verified. Allergies were reviewed and verbal consent was obtained. The procedure site was marked.    Injection:  A therapeutic injection of combination of 4 cc 1% Lidocaine and 40mg Triamcinolone was given under sterile technique from the lateral aspect of the Right Greater trochanter Rt hip.  Sterile dressing applied.     Patient tolerated the procedure well. Pain decreased. She had no adverse reactions to the medication.     The patient is cautioned that immediate relief of pain is secondary to the local anesthetic and will be temporary. After the anesthetic wears off there may be a increase in pain that may last for a few hours or a few days. Advised patient to apply ice for 20 minutes to help alleviate pain and ACE wrap for compression. Advised patient to avoid strenuous activities for the next 24-48 hours. She was warned of possible blood sugar and/or blood pressure changes following injection. She was reminded to call the clinic  immediately for any adverse side effects as explained in clinic today.       Carlos Black PA-C  [facility]       All of the patient's questions were answered and the patient will contact us if they have any questions or concerns in the interim.    Carlos Black PA-C  [facility]    This note was generated with the assistance of ambient listening technology. Verbal consent was obtained by the patient and accompanying visitor(s) for the recording of patient appointment to facilitate this note. I attest to having reviewed and edited the generated note for accuracy, though some syntax or spelling errors may persist. Please contact the author of this note for any clarification.               [1]   Current Outpatient Medications:     acetaZOLAMIDE (DIAMOX) 500 mg CpSR, Take 1 capsule by mouth 2 (two) times daily., Disp: , Rfl:     atropine 1% (ISOPTO ATROPINE) 1 % Drop, Place 1 drop into the left eye 2 (two) times a day., Disp: 5 mL, Rfl: 1    blood sugar diagnostic Strp, To check BG 1 times daily, to use with insurance preferred meter, Disp: 100 strip, Rfl: 3    blood-glucose meter kit, To check BG 1 times daily, to use with insurance preferred meter, Disp: 1 each, Rfl: 0    cetirizine (ZYRTEC) 10 mg Cap, Take 10 mg by mouth Daily., Disp: 90 capsule, Rfl: 3    ciclopirox (PENLAC) 8 % Soln, Apply topically nightly., Disp: 6.6 mL, Rfl: 3    famotidine (PEPCID) 20 MG tablet, Take 1 tablet (20 mg total) by mouth 1 (one) time if needed (itching)., Disp: 5 tablet, Rfl: 0    fluticasone propionate (FLONASE) 50 mcg/actuation nasal spray, 1 spray (50 mcg total) by Each Nostril route once daily., Disp: 16 g, Rfl: 11    lancets Misc, To check BG 1 times daily, to use with insurance preferred meter, Disp: 100 each, Rfl: 4    linaCLOtide (LINZESS) 72 mcg Cap capsule, Take 1 capsule (72 mcg total) by mouth before breakfast., Disp: 30 capsule, Rfl: 2    lisinopriL  (PRINIVIL,ZESTRIL) 20 MG tablet, Take 1 tablet (20 mg total) by mouth once daily., Disp: 90 tablet, Rfl: 3    meclizine (ANTIVERT) 25 mg tablet, Take 1 tablet (25 mg total) by mouth 3 (three) times daily as needed for Dizziness., Disp: 30 tablet, Rfl: 0    mirabegron (MYRBETRIQ) 50 mg Tb24, Take 1 tablet by mouth., Disp: , Rfl:     neomycin-polymyxin-dexamethasone (DEXACINE) 3.5 mg/g-10,000 unit/g-0.1 % Oint, Apply 0.5 inches to eye., Disp: , Rfl:     ondansetron (ZOFRAN-ODT) 8 MG TbDL, Take 1 tablet (8 mg total) by mouth every 8 (eight) hours as needed., Disp: 25 tablet, Rfl: 0    OZEMPIC 1 mg/dose (4 mg/3 mL), Inject 1 mg into the skin every 7 days., Disp: 12 each, Rfl: 3    pregabalin (LYRICA) 75 MG capsule, Take 1 capsule (75 mg total) by mouth 2 (two) times daily., Disp: 60 capsule, Rfl: 2    rosuvastatin (CRESTOR) 40 MG Tab, Take 1 tablet (40 mg total) by mouth every evening., Disp: 90 tablet, Rfl: 3

## 2025-06-17 ENCOUNTER — RESULTS FOLLOW-UP (OUTPATIENT)
Dept: FAMILY MEDICINE | Facility: CLINIC | Age: 65
End: 2025-06-17

## 2025-06-26 ENCOUNTER — OFFICE VISIT (OUTPATIENT)
Dept: ORTHOPEDICS | Facility: CLINIC | Age: 65
End: 2025-06-26
Payer: MEDICAID

## 2025-06-26 VITALS — HEIGHT: 66 IN | BODY MASS INDEX: 26.22 KG/M2 | WEIGHT: 163.13 LBS

## 2025-06-26 DIAGNOSIS — M25.551 RIGHT HIP PAIN: ICD-10-CM

## 2025-06-26 DIAGNOSIS — M76.31 ILIOTIBIAL BAND SYNDROME OF RIGHT SIDE: Primary | ICD-10-CM

## 2025-06-26 DIAGNOSIS — M70.61 GREATER TROCHANTERIC BURSITIS OF RIGHT HIP: ICD-10-CM

## 2025-06-26 PROCEDURE — 99999 PR PBB SHADOW E&M-EST. PATIENT-LVL IV: CPT | Mod: PBBFAC,,,

## 2025-06-26 PROCEDURE — 4010F ACE/ARB THERAPY RXD/TAKEN: CPT | Mod: CPTII,,,

## 2025-06-26 PROCEDURE — 1160F RVW MEDS BY RX/DR IN RCRD: CPT | Mod: CPTII,,,

## 2025-06-26 PROCEDURE — 3061F NEG MICROALBUMINURIA REV: CPT | Mod: CPTII,,,

## 2025-06-26 PROCEDURE — 3066F NEPHROPATHY DOC TX: CPT | Mod: CPTII,,,

## 2025-06-26 PROCEDURE — 3044F HG A1C LEVEL LT 7.0%: CPT | Mod: CPTII,,,

## 2025-06-26 PROCEDURE — 3008F BODY MASS INDEX DOCD: CPT | Mod: CPTII,,,

## 2025-06-26 PROCEDURE — 1159F MED LIST DOCD IN RCRD: CPT | Mod: CPTII,,,

## 2025-06-26 PROCEDURE — 99214 OFFICE O/P EST MOD 30 MIN: CPT | Mod: PBBFAC,PN

## 2025-06-26 PROCEDURE — 99214 OFFICE O/P EST MOD 30 MIN: CPT | Mod: S$PBB,,,

## 2025-06-26 NOTE — PROGRESS NOTES
NEW PATIENT ORTHOPAEDIC: HIP    PRIMARY CARE PHYSICIAN: Diaz Yip MD   REFERRING PROVIDER: Carlos Black PA-C  [address] [place] [address]     ASSESSMENT & PLAN:    Impression:  Right Hip Trochanteric Bursitis  Right IT band syndrome  Chronic low back pain with associated radicular symptoms       Follow Up Plan: 12 weeks or sooner if needed     Non operative care:    Torsten Trevizo has physical exam evidence of above and wishes to pursue an non-operative care. I am recommending the following:   Patient comes in with atraumatic right hip pain. This has been ongoing for greater than 6 months. Not specific inciting event. Reports she has right-sided low back pain, lateral based hip pain, occasional inguinal pain, and pain that radiates laterally and posteriorly down RLE. She has seen multiple healthcare providers for this. She has seen [facility], [facility], OBGYN, urgent care ortho, and her PCP. Neuro with MRI L spine showing mild spondylosis, EMG normal, on Lyrica - dose recently increased by PCP which has provided good relief. OBGYN did vaginal US to r/o ovarian issues. Urgent care ortho did greater trochanteric bursa injection on 6/11/25 which only provided relief for 4-5 days. Hip radiographs do not demonstrate significant degenerative changes. On clinical exam, she has significant tenderness over greater trochanter and IT band.  Additionally tender over gluteal tendons extending into gluteal musculature.  Does report lateral based pain with IR/ER.  Negative straight leg raise.  I think she is dealing with multiple confounding issues in regards to her pain generators.  She does report this little bit of pain and associated lower extremity radicular symptoms.  But she also does have reproducible lateral based pain with palpation and IR/ER.  Does report occasional inguinal pain with ambulation.  We discussed multiple treatment modalities today including oral medications, home  "exercise program versus formal physical therapy, injections, and others.  Previous greater trochanteric bursa injections did not provide significant relief for an extended amount of time.  Lyrica is helping, recommend she continue this per her primary care physician.  She has not attempted formal physical therapy.  She is amenable to physical therapy referral.  Referral placed.  I will plan to see her back in 12 weeks or sooner if needed for clinical re-evaluation.    The patient has been ordered:  Physical Therapy    CONSULTS:   None    ACTIVE PROBLEM LIST  Problem List[1]        SUBJECTIVE    CHIEF COMPLAINT: Hip Pain    Initial Visit 6/26/25 HPI:   Torsten Trevizo is a 64 y.o. female here for evaluation and management of right hip pain. There is not a specific incident that brought about this pain. she has had progressive problems with the hip(s) starting 6 months ago and is progressing which is now interfering with activities which include: walking, exercise, rising from a sitting position, and standing for prolonged periods of time    Currently the pain in the joint is moderate with activity.  The pain is intermittent and is located in buttocks, lateral hip, groin, and thigh.  The pain is described as aching, radiating, and shooting . Reports pain that radiates in posterior leg that feels like "brusing under skin" and hypersensitive to touch. Relieving factors include rest, prescription medication, over the counter medication , and repositioning. No associated Catching, Clicking, and Popping.     They do not report leg length inequality.     Underwent greater trochanteric bursa steroid injection with another orthopedic provider roughly two weeks ago. Was instructed to follow up with general orthopedics in 2 weeks following injection. Injection provided minor relief of lateral based hip pain for 4-5 days.     She has been seen by multiple healthcare specialists in regards to this pain. She has been on Lyrica as " prescribed by her PCP and her dose was recently increased, which she has noticed significant improvement from. Has previously been evaluated by neurology with [facility] and [facility]. MRI L-spine which showed mild spondylosis. Neuro performed LE EMG which came back normal. A vaginal ultrasound was performed by her OBGYN to rule out ovarian issues, and bowel involvement was also considered.      Torsten Trevioz has no additional complaints.     PROGRESSIVE SYMPTOMS:  Pain worsened by weight bearing  Pain effecting living situation    FUNCTIONAL STATUS:   Do light to moderate work around the house  Walk a block or two on level ground   Climb a flight of stairs or walk up a hill     PREVIOUS TREATMENTS:  Medical: OTC NSAIDS, RX NSAIDS, Steroid Injections, Narcotics, and Tylenol  Physical Therapy: None  Previous Orthopaedic Surgery: none    REVIEW OF SYSTEMS:  PAIN ASSESSMENT:  See HPI.  MUSCULOSKELETAL: See HPI.  OTHER 10 point review of systems is negative except as stated in HPI above    PAST MEDICAL HISTORY   has a past medical history of Cataract, Diabetes mellitus, High cholesterol, Hypertension, and Neovascular glaucoma of left eye, moderate stage (2024).     PAST SURGICAL HISTORY   has a past surgical history that includes  section; Hysterectomy; Cholecystectomy; Phacoemulsification of cataract (Right, 2021); Intraocular prosthesis insertion (Right, 2021); Cataract extraction w/  intraocular lens implant (Right, 2021); Eye surgery (Right); Extraction of cataract (Left, 10/14/2021); Intraocular prosthesis insertion (Left, 10/14/2021); Esophagogastroduodenoscopy (N/A, 2023); and Colonoscopy (N/A, 2023).     FAMILY HISTORY  family history includes Cataracts in her father and mother; Coronary artery disease in her mother; Diabetes in her mother; Glaucoma in her daughter and daughter; Hypertension in her father and mother; Lung cancer in her brother; Prostate cancer in her  "father; Stomach cancer in her brother; Stroke in her father.     SOCIAL HISTORY   reports that she has never smoked. She has never used smokeless tobacco. She reports current alcohol use. She reports that she does not use drugs.     ALLERGIES   Review of patient's allergies indicates:   Allergen Reactions    Oxycodone-acetaminophen Hives and Itching        MEDICATIONS   Medications Ordered Prior to Encounter[2]       PHYSICAL EXAM   height is 5' 6" (1.676 m) and weight is 74 kg (163 lb 2.3 oz).   Body mass index is 26.33 kg/m².      All other systems deferred.  GENERAL:  No acute distress  HABITUS: Normal  GAIT: Non-antalgic  SKIN: Normal     HIP EXAM:    right:   ROM:   Flexion: 120 degrees    Internal Rotation: 30 degrees    External Rotation: 30 degrees    Abduction: 45 degrees    Adduction: 20 degrees  No apparent leg length discrepancy    Palpation: Yes tenderness over Greater trochanter, IT band, and Gluteal insertions   Pain is reproduced with IR or ER of the hip - pain felt mostly lateral   Strength: 5/5 hip flexion, abduction, knee flexion and extension   Straight leg raise: Negative   Neurovascular Status: Sensation intact to light touch in Sural, saphenous, SPN, DPN, Tibial nerve distribution  2+ pulse DP/PT, normal capillary refill, foot has normal warmth    DATA:  Diagnostic tests reviewed for today's visit:     AP pelvis, hip, and lateral radiographs shows normal appearance of the bilateral hip joints. Normal appearance of the bilateral femoral head contour. No fracture, no osseous lesions, no degenerative changes. The bilateral sacroiliac joints appear normal. The soft tissues appear normal.     Lumbar MRI revealed mild lumbar spondylosis. No spinal canal stenosis or high-grade neural foraminal narrowing.              [1]   Patient Active Problem List  Diagnosis    Dyslipidemia hx of crestor, lipitor    Essential hypertension hx of hctz, amlodipine, lisinopril, propranolol    Type 2 diabetes mellitus " without complication, without long-term current use of insulin hx metformin with GI SE; saxagliptin expensive    GERD (gastroesophageal reflux disease)    Headache chronic with hx of topamax hx of propranolol    Primary insomnia hx of melatonin without relief; hx of trazodone    Screening for colon cancer 12/19/23 colonoscopy normal repeat 10 years    History of Helicobacter pylori infection s/p treatment; 7/2024 stool H pylori neg    History of abdominal hysterectomy    Pseudophakia of both eyes    Central retinal vein occlusion with macular edema of left eye    Neovascular glaucoma of left eye, moderate stage    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      atropine 1% (ISOPTO ATROPINE) 1 % Drop Place 1 drop into the left eye 2 (two) times a day. 5 mL 1    blood sugar diagnostic Strp To check BG 1 times daily, to use with insurance preferred meter 100 strip 3    blood-glucose meter kit To check BG 1 times daily, to use with insurance preferred meter 1 each 0    cetirizine (ZYRTEC) 10 mg Cap Take 10 mg by mouth Daily. 90 capsule 3    ciclopirox (PENLAC) 8 % Soln Apply topically nightly. 6.6 mL 3    famotidine (PEPCID) 20 MG tablet Take 1 tablet (20 mg total) by mouth 1 (one) time if needed (itching). 5 tablet 0    fluticasone propionate (FLONASE) 50 mcg/actuation nasal spray 1 spray (50 mcg total) by Each Nostril route once daily. 16 g 11    lancets Misc To check BG 1 times daily, to use with insurance preferred meter 100 each 4    linaCLOtide (LINZESS) 72 mcg Cap capsule Take 1 capsule (72 mcg total) by mouth before breakfast. 30 capsule 2    lisinopriL (PRINIVIL,ZESTRIL) 20 MG tablet Take 1 tablet (20 mg total) by mouth once daily. 90 tablet 0    meclizine (ANTIVERT) 25 mg tablet Take 1 tablet  (25 mg total) by mouth 3 (three) times daily as needed for Dizziness. 30 tablet 0    mirabegron (MYRBETRIQ) 50 mg Tb24 Take 1 tablet by mouth.      neomycin-polymyxin-dexamethasone (DEXACINE) 3.5 mg/g-10,000 unit/g-0.1 % Oint Apply 0.5 inches to eye.      ondansetron (ZOFRAN-ODT) 8 MG TbDL Take 1 tablet (8 mg total) by mouth every 8 (eight) hours as needed. 25 tablet 0    OZEMPIC 1 mg/dose (4 mg/3 mL) Inject 1 mg into the skin every 7 days. 12 each 3    pregabalin (LYRICA) 75 MG capsule Take 1 capsule (75 mg total) by mouth 2 (two) times daily. 60 capsule 2    rosuvastatin (CRESTOR) 40 MG Tab Take 1 tablet (40 mg total) by mouth every evening. 90 tablet 3     No current facility-administered medications on file prior to visit.

## 2025-07-07 DIAGNOSIS — R20.2 TINGLING IN EXTREMITIES: ICD-10-CM

## 2025-07-07 DIAGNOSIS — M25.551 RIGHT HIP PAIN: ICD-10-CM

## 2025-07-07 RX ORDER — PREGABALIN 75 MG/1
75 CAPSULE ORAL 2 TIMES DAILY
Qty: 60 CAPSULE | Refills: 2 | Status: SHIPPED | OUTPATIENT
Start: 2025-07-07 | End: 2026-01-05

## 2025-07-07 NOTE — TELEPHONE ENCOUNTER
No care due was identified.  [facility] Embedded Care Due Messages. Reference number: [id number].   7/07/2025 10:53:47 AM CDT

## 2025-07-17 ENCOUNTER — CLINICAL SUPPORT (OUTPATIENT)
Dept: REHABILITATION | Facility: HOSPITAL | Age: 65
End: 2025-07-17
Payer: COMMERCIAL

## 2025-07-17 DIAGNOSIS — M70.61 GREATER TROCHANTERIC BURSITIS OF RIGHT HIP: Primary | ICD-10-CM

## 2025-07-17 DIAGNOSIS — M53.86 DECREASED ROM OF LUMBAR SPINE: ICD-10-CM

## 2025-07-17 DIAGNOSIS — R29.898 WEAKNESS OF BOTH LOWER EXTREMITIES: ICD-10-CM

## 2025-07-17 PROCEDURE — 97161 PT EVAL LOW COMPLEX 20 MIN: CPT | Mod: PN

## 2025-07-17 PROCEDURE — 97110 THERAPEUTIC EXERCISES: CPT | Mod: PN

## 2025-07-17 NOTE — PROGRESS NOTES
Outpatient Rehab    Physical Therapy Evaluation    Patient Name: Torsten Trevizo  MRN: [id number]  YOB: 1960  Encounter Date: 7/17/2025    Therapy Diagnosis:   Encounter Diagnoses   Name Primary?    Greater trochanteric bursitis of right hip Yes    Decreased ROM of lumbar spine     Weakness of both lower extremities      Physician: Negin Nunez PA-C    Physician Orders: Eval and Treat  Medical Diagnosis: Greater trochanteric bursitis of right hip  Surgical Diagnosis: Not applicable for this Episode   Surgical Date: Not applicable for this Episode  Days Since Last Surgery: Not applicable for this Episode    Visit # / Visits Authorized:  1 / 1  Insurance Authorization Period: 6/26/2025 to 12/31/2025  Date of Evaluation: 7/17/2025  Plan of Care Certification: 7/17/2025 to 10/9/2025      Time In: 1107   Time Out: 1200  Total Time (in minutes): 53   Total Billable Time (in minutes): 50    Intake Outcome Measure for FOTO Survey    Therapist reviewed FOTO scores for Torsten Trevizo on 7/17/2025.   FOTO report - see Media section or FOTO account episode details.     Intake Score: 28%    Precautions:       Subjective   History of Present Illness  Torsten is a 65 y.o. female who reports to physical therapy with a chief concern of Right hip pain.     The patient reports a medical diagnosis of M70.61 (ICD-10-CM) - Greater trochanteric bursitis of right hip. The patient has experienced this issue since 06/26/25.           History of Present Condition/Illness: Patient is a 65 year old female who presents to PT with right-sided low back pain, lateral based hip pain, occasional inguinal pain, and pain that radiates laterally and posteriorly down RLE. Pain began 2 months ago with SAULO. She reports N/T in the lateral thigh but no reports of bowel and blader control.    Activities of Daily Living  Social history was obtained from Patient.               Previously independent with activities of daily living? Yes      Currently independent with activities of daily living? Yes          Previously independent with instrumental activities of daily living? No  Activities previously needing assistance include: Grocery/shopping.   Currently independent with instrumental activities of daily living? No  Activities currently needing assistance include: Grocery/shopping.            Pain     Patient reports a current pain level of 7/10. Pain at best is reported as 4/10. Pain at worst is reported as 10/10.   Location: Lumbar spine, R lateral hip and thigh, inguinal region  Pain Qualities: Aching, Needle-like  Pain-Relieving Factors: Medications - prescription, Medications - over-the-counter, Heat, Lying down, Other (Comment)  Other Pain-Relieving Factors: Fetal position  Pain-Aggravating Factors: Walking, Bending         Living Arrangements  Living Situation  Housing: Home independently  Living Arrangements: Children, Spouse/significant other    Home Setup  Type of Structure: Apartment/condo  Home Access: Stairs with rails  Entrance Stairs - Number of Steps: 27  Entrance Stairs - Rails: Left        Employment  Employment Status: Retired          Past Medical History/Physical Systems Review:   Torsten Trevizo  has a past medical history of Cataract, Diabetes mellitus, High cholesterol, Hypertension, and Neovascular glaucoma of left eye, moderate stage.    Torsten Trevizo  has a past surgical history that includes  section; Hysterectomy; Cholecystectomy; Phacoemulsification of cataract (Right, 2021); Intraocular prosthesis insertion (Right, 2021); Cataract extraction w/  intraocular lens implant (Right, 2021); Eye surgery (Right); Extraction of cataract (Left, 10/14/2021); Intraocular prosthesis insertion (Left, 10/14/2021); Esophagogastroduodenoscopy (N/A, 2023); and Colonoscopy (N/A, 2023).    Torsten has a current medication list which includes the following prescription(s): acetazolamide, atropine 1%,  blood sugar diagnostic, blood-glucose meter, zyrtec, ciclopirox, famotidine, fluticasone propionate, lancets, linaclotide, lisinopril, meclizine, mirabegron, neomycin-polymyxin-dexamethasone, ondansetron, ozempic, pregabalin, and rosuvastatin.    Review of patient's allergies indicates:   Allergen Reactions    Oxycodone-acetaminophen Hives and Itching        Objective   Posture    Flat lumbar spine is observed.     Pelvic tilt observed: Posterior              Lower Extremity Sensation  Right Lumbar/Lower Extremity Sensation  Intact: Light Touch       Left Lumbar/Lower Extremity Sensation  Intact: Light Touch                Spinal Mobility  Hypomobile: Thoracic and Lumbosacral  Thoracic Mobility Details: Increased paraspinal muscle guarding  Lumbosacral Mobility Details: Increased paraspinal muscle guarding    Spinal Muscle Palpation  Right Spinal Muscle Palpation  Abnormal: Lumbar/Sacral  Right Lumbar/Sacral Muscle Palpation Observations: TTP paraspinals       Left Spinal Muscle Palpation  Abnormal: Lumbar/Sacral  Left Lumbar/Sacral Muscle Palpation Observations: TTP paraspinals       Hip Palpation  Right Hip Palpation  Abnormal: Hip Muscle, Hip Bony Prominence/Bursa, and Lumbar/Sacral Muscle  Right Hip Muscle Palpation Observations: TTP glute med tendon  Right Hip Bony Prominence/Bursa Palpation Observations: TTP greater trochanter  Right Lumbar/Sacral Muscle Palpation Observations: TTP paraspinals       Left Hip Palpation  Abnormal: Lumbar/Sacral Muscle  Unremarkable: Hip Muscle and Hip Bony Prominence/Bursa  Left Lumbar/Sacral Muscle Palpation Observations: TTP paraspinals            Lumbar Range of Motion   Active (deg) Passive (deg) Pain   Flexion 75       Extension 50       Right Lateral Flexion 50       Right Rotation 50       Left Lateral Flexion 75       Left Rotation 75                Hip Range of Motion   Right Hip   Active (deg) Passive (deg) Pain   Flexion 90 110 Yes   Extension         ABduction 20    Yes   ADduction         External Rotation 90/90 25   Yes   External Rotation Prone         Internal Rotation 90/90 40   Yes   Internal Rotation Prone             Left Hip   Active (deg) Passive (deg) Pain   Flexion 110       Extension         ABduction 25       ADduction         External Rotation 90/90 30       External Rotation Prone         Internal Rotation 90/90 45       Internal Rotation Prone                  Knee Range of Motion   Right Knee   Active (deg) Passive (deg) Pain   Flexion 125       Extension 0           Left Knee   Active (deg) Passive (deg) Pain   Flexion 125       Extension 0                          Hip Strength - Planes of Motion   Right Strength Right Pain Left Strength Left  Pain   Flexion (L2) 3+ Yes 4     Extension           ABduction 2+ Yes 4     ADduction           Internal Rotation 4 Yes 4+     External Rotation 3+ Yes 4+         Knee Strength   Right Strength Right Pain Left Strength Left  Pain   Flexion (S2)           Prone Flexion 4   4     Extension (L3) 5   5            Ankle/Foot Strength - Planes of Motion   Right Strength Right Pain Left Strength Left  Pain   Dorsiflexion (L4) 5   5     Plantar Flexion (S1) 5   5     Inversion           Eversion           Great Toe Flexion 5   5     Great Toe Extension (L5)           Lesser Toes Flexion           Lesser Toes Extension                       Cervical/Thoracic Special Tests  Thoracic Tests  Positive: Slump         Lumbar/Pelvic Girdle Special Tests       Lumbar Tests - SLR and Tension  Negative: Right Passive Straight Leg Raise and Left Passive Straight Leg Raise                 Pelvic Girdle / Sacrum Tests  Positive: Right ERIC and Right FADIR  Negative: Left ERIC and Left FADIR         Hip Special Tests  Intra-Articular/Impingement Tests  Positive: Right ERIC and Right FADIR  Negative: Left ERIC and Left FADIR  Flex/Imbalance/Structural Tests  Positive: Right Ely's, Left Ely's, Right Modified Jeronimo's, and Left Modified  "Jeronimo's  Won Test  Positive: Right and Left          Knee Special Tests  Knee Flexibility Tests  Positive: Right Ely's and Left Ely's                  Transfers Assessment  Sit to Stand Assistance: Independent  Chair to Bed Assistance: Independent  Bed to Chair Assistance: Independent    Bed Mobility Assessment  Rolling Assistance: Supervision  Sidelying to Sit Assistance: Supervision  Sit to Sidelying Assistance: Supervision      Ambulation Assistance Required  Surface With  Assistive Device Without Assistive Device Details   Level   Supervision      Uneven         Curb           Gait Analysis  Base of Support: Narrow  Gait Pattern: Antalgic  Walking Speed: Decreased    Right Side Walking Observations  Decreased: Stance Time       Left Side Walking Observations  Decreased: Swing Time and Step Length            Treatment:  Therapeutic Exercise  TE 1: GTB clamshells 2x10  TE 2: PPT 2x10x5"  TE 3: Incline bridges 2x10      Time Entry(in minutes):  PT Evaluation (Low) Time Entry: 35  Therapeutic Exercise Time Entry: 15    Assessment & Plan   Assessment  Torsten presents with a condition of Low complexity.   Presentation of Symptoms: Stable       Functional Limitations: Activity tolerance, Completing work/school activities, Functional mobility, Pain with ADLs/IADLs, Painful locomotion/ambulation, Participating in leisure activities, Range of motion, Squatting, Standing tolerance, Ambulating on uneven surfaces, Sitting tolerance  Impairments: Abnormal or restricted range of motion, Activity intolerance, Impaired physical strength, Pain with functional activity, Abnormal gait  Personal Factors Affecting Prognosis: Pain    Patient Goal for Therapy (PT): Patient wants to decrease her pain  Prognosis: Good  Assessment Details: Patient is a 65 year old female who presents to PT with signs and symptoms consistent with referring diagnosis with potential lumbar involvement. They present with decreased lumbar and hip range of " motion, impaired BLE strength, poor abdominal motor control, and increased pain. Pain provocation pattern indicates separate hip pathology and radicular symptoms on the lateral hip and thigh. Lumbar screening was positive with the slump test and she demonstrates a flexion based preference. They are functionally limited in transfers, ambulation, and stair climbing. Self rated disability is considered severe with a LEFS intake score of 13. The evaluation is considered low complexity secondary to co-morbid conditions and stable nature of the identified medical condition. They were educated on plan of care and consented to treatment. No precautions or contraindications to therapy were identified. They will benefit from skilled PT intervention to address functional deficits identified and return to prior level of function.      Plan  From a physical therapy perspective, the patient would benefit from: Skilled Rehab Services    Planned therapy interventions include: Therapeutic exercise, Therapeutic activities, Neuromuscular re-education, and Manual therapy.    Planned modalities to include: Biofeedback, Cryotherapy (cold pack), Electrical stimulation - attended, Electrical stimulation - passive/unattended, Thermotherapy (hot pack), and Other (Comment). Dry needling      Visit Frequency: 2 times Per Week for 12 Weeks.       This plan was discussed with Patient.   Discussion participants: Agreed Upon Plan of Care             The patient's spiritual, cultural, and educational needs were considered, and the patient is agreeable to the plan of care and goals.     Education  Education was done with Patient. The patient's learning style includes Demonstration, Listening, and Pictures/video. The patient Demonstrates understanding and Verbalizes understanding.         Patient educated on POC, goals, HEP, and consents to treatment       Goals:   Active       1. Short term goals        Pt to report compliance with HEP and  demonstrate proper exercise technique to PT to show competence with self management of condition.         Start:  07/17/25    Expected End:  08/28/25            Pt will report decreased LBP <3/10  to demonstrate improved activity tolerance.         Start:  07/17/25    Expected End:  08/28/25            Pt will demonstrate improved lumbar ROM by 25% in order to perform ADLs without difficulty.         Start:  07/17/25    Expected End:  08/28/25            Patient will improve BLE strength to 4/5 in order to safely perform ADLs         Start:  07/17/25    Expected End:  08/28/25               2. Long term goals        Patient will improve their FOTO score to 50 or greater indicating clinically significant improvement in function        Start:  07/17/25    Expected End:  10/09/25            Pt will tolerate standing for 20 minutes or more to demonstrate improved activity tolerance in order to meet workplace demands.         Start:  07/17/25    Expected End:  10/09/25            Pt will perform 10 consecutive sit to stands </= 3/10 pain to demonstrate improved tolerance for seated transfers.         Start:  07/17/25    Expected End:  10/09/25            Patient will demonstrate less than 3/10 pain bending over in order to safely reach for objects on the floor.         Start:  07/17/25    Expected End:  10/09/25                Vince Arita PT

## 2025-07-22 DIAGNOSIS — E11.9 TYPE 2 DIABETES MELLITUS WITHOUT COMPLICATION, WITHOUT LONG-TERM CURRENT USE OF INSULIN: ICD-10-CM

## 2025-07-22 NOTE — TELEPHONE ENCOUNTER
No care due was identified.  [facility] Embedded Care Due Messages. Reference number: [id number].   7/22/2025 6:29:37 PM CDT

## 2025-07-23 RX ORDER — SEMAGLUTIDE 1.34 MG/ML
1 INJECTION, SOLUTION SUBCUTANEOUS
Qty: 12 EACH | Refills: 3 | Status: SHIPPED | OUTPATIENT
Start: 2025-07-23

## 2025-08-26 ENCOUNTER — TELEPHONE (OUTPATIENT)
Dept: FAMILY MEDICINE | Facility: CLINIC | Age: 65
End: 2025-08-26
Payer: MEDICAID

## 2025-08-26 DIAGNOSIS — K59.09 CHRONIC CONSTIPATION: ICD-10-CM

## 2025-08-26 DIAGNOSIS — K59.04 CHRONIC IDIOPATHIC CONSTIPATION: Primary | ICD-10-CM

## 2025-09-02 ENCOUNTER — E-VISIT (OUTPATIENT)
Dept: FAMILY MEDICINE | Facility: CLINIC | Age: 65
End: 2025-09-02
Payer: MEDICAID

## 2025-09-02 ENCOUNTER — HOSPITAL ENCOUNTER (EMERGENCY)
Facility: HOSPITAL | Age: 65
Discharge: HOME | End: 2025-09-02
Attending: EMERGENCY MEDICINE
Payer: MEDICAID

## 2025-09-02 VITALS
DIASTOLIC BLOOD PRESSURE: 84 MMHG | RESPIRATION RATE: 18 BRPM | BODY MASS INDEX: 26.2 KG/M2 | SYSTOLIC BLOOD PRESSURE: 176 MMHG | HEART RATE: 68 BPM | HEIGHT: 66 IN | WEIGHT: 163 LBS | TEMPERATURE: 98 F | OXYGEN SATURATION: 99 %

## 2025-09-02 DIAGNOSIS — R51.9 ACUTE NONINTRACTABLE HEADACHE, UNSPECIFIED HEADACHE TYPE: Primary | ICD-10-CM

## 2025-09-02 DIAGNOSIS — J31.0 RHINITIS, UNSPECIFIED TYPE: ICD-10-CM

## 2025-09-02 DIAGNOSIS — R51.9 NONINTRACTABLE HEADACHE, UNSPECIFIED CHRONICITY PATTERN, UNSPECIFIED HEADACHE TYPE: Primary | ICD-10-CM

## 2025-09-02 LAB
ABSOLUTE EOSINOPHIL (OHS): 0.27 K/UL
ABSOLUTE MONOCYTE (OHS): 0.41 K/UL (ref 0.3–1)
ABSOLUTE NEUTROPHIL COUNT (OHS): 2.36 K/UL (ref 1.8–7.7)
ALBUMIN SERPL BCP-MCNC: 4 G/DL (ref 3.5–5.2)
ALP SERPL-CCNC: 72 UNIT/L (ref 40–150)
ALT SERPL W/O P-5'-P-CCNC: 22 UNIT/L (ref 10–44)
ANION GAP (OHS): 8 MMOL/L (ref 8–16)
AST SERPL-CCNC: 17 UNIT/L (ref 11–45)
BASOPHILS # BLD AUTO: 0.06 K/UL
BASOPHILS NFR BLD AUTO: 1.1 %
BILIRUB SERPL-MCNC: 0.3 MG/DL (ref 0.1–1)
BUN SERPL-MCNC: 18 MG/DL (ref 8–23)
CALCIUM SERPL-MCNC: 9.5 MG/DL (ref 8.7–10.5)
CHLORIDE SERPL-SCNC: 113 MMOL/L (ref 95–110)
CO2 SERPL-SCNC: 21 MMOL/L (ref 23–29)
CREAT SERPL-MCNC: 0.9 MG/DL (ref 0.5–1.4)
CTP QC/QA: YES
CTP QC/QA: YES
ERYTHROCYTE [DISTWIDTH] IN BLOOD BY AUTOMATED COUNT: 12.5 % (ref 11.5–14.5)
GFR SERPLBLD CREATININE-BSD FMLA CKD-EPI: >60 ML/MIN/1.73/M2
GLUCOSE SERPL-MCNC: 97 MG/DL (ref 70–110)
HCT VFR BLD AUTO: 32.4 % (ref 37–48.5)
HGB BLD-MCNC: 10.5 GM/DL (ref 12–16)
IMM GRANULOCYTES # BLD AUTO: 0.01 K/UL (ref 0–0.04)
IMM GRANULOCYTES NFR BLD AUTO: 0.2 % (ref 0–0.5)
LYMPHOCYTES # BLD AUTO: 2.4 K/UL (ref 1–4.8)
MAGNESIUM SERPL-MCNC: 1.7 MG/DL (ref 1.6–2.6)
MCH RBC QN AUTO: 30.1 PG (ref 27–31)
MCHC RBC AUTO-ENTMCNC: 32.4 G/DL (ref 32–36)
MCV RBC AUTO: 93 FL (ref 82–98)
MOLECULAR STREP A: NEGATIVE
NUCLEATED RBC (/100WBC) (OHS): 0 /100 WBC
PLATELET # BLD AUTO: 208 K/UL (ref 150–450)
PMV BLD AUTO: 10.5 FL (ref 9.2–12.9)
POTASSIUM SERPL-SCNC: 3.4 MMOL/L (ref 3.5–5.1)
PROT SERPL-MCNC: 7.1 GM/DL (ref 6–8.4)
RBC # BLD AUTO: 3.49 M/UL (ref 4–5.4)
RELATIVE EOSINOPHIL (OHS): 4.9 %
RELATIVE LYMPHOCYTE (OHS): 43.6 % (ref 18–48)
RELATIVE MONOCYTE (OHS): 7.4 % (ref 4–15)
RELATIVE NEUTROPHIL (OHS): 42.8 % (ref 38–73)
SARS-COV-2 RDRP RESP QL NAA+PROBE: NEGATIVE
SODIUM SERPL-SCNC: 142 MMOL/L (ref 136–145)
WBC # BLD AUTO: 5.51 K/UL (ref 3.9–12.7)

## 2025-09-02 PROCEDURE — 63600175 PHARM REV CODE 636 W HCPCS: Performed by: EMERGENCY MEDICINE

## 2025-09-02 PROCEDURE — 80053 COMPREHEN METABOLIC PANEL: CPT | Performed by: EMERGENCY MEDICINE

## 2025-09-02 PROCEDURE — 25000003 PHARM REV CODE 250: Performed by: EMERGENCY MEDICINE

## 2025-09-02 PROCEDURE — 99284 EMERGENCY DEPT VISIT MOD MDM: CPT | Mod: 25

## 2025-09-02 PROCEDURE — 85025 COMPLETE CBC W/AUTO DIFF WBC: CPT | Performed by: EMERGENCY MEDICINE

## 2025-09-02 PROCEDURE — 87651 STREP A DNA AMP PROBE: CPT

## 2025-09-02 PROCEDURE — 96375 TX/PRO/DX INJ NEW DRUG ADDON: CPT

## 2025-09-02 PROCEDURE — 96374 THER/PROPH/DIAG INJ IV PUSH: CPT

## 2025-09-02 PROCEDURE — 83735 ASSAY OF MAGNESIUM: CPT | Performed by: EMERGENCY MEDICINE

## 2025-09-02 PROCEDURE — 87635 SARS-COV-2 COVID-19 AMP PRB: CPT | Performed by: PHYSICIAN ASSISTANT

## 2025-09-02 RX ORDER — ACETAMINOPHEN 500 MG
1000 TABLET ORAL
Status: COMPLETED | OUTPATIENT
Start: 2025-09-02 | End: 2025-09-02

## 2025-09-02 RX ORDER — DIPHENHYDRAMINE HCL 25 MG
25 CAPSULE ORAL
Status: COMPLETED | OUTPATIENT
Start: 2025-09-02 | End: 2025-09-02

## 2025-09-02 RX ORDER — MAGNESIUM SULFATE HEPTAHYDRATE 40 MG/ML
2 INJECTION, SOLUTION INTRAVENOUS ONCE
Status: COMPLETED | OUTPATIENT
Start: 2025-09-02 | End: 2025-09-02

## 2025-09-02 RX ORDER — METOCLOPRAMIDE 10 MG/1
10 TABLET ORAL
Status: COMPLETED | OUTPATIENT
Start: 2025-09-02 | End: 2025-09-02

## 2025-09-02 RX ORDER — PROCHLORPERAZINE EDISYLATE 5 MG/ML
10 INJECTION INTRAMUSCULAR; INTRAVENOUS ONCE
Status: COMPLETED | OUTPATIENT
Start: 2025-09-02 | End: 2025-09-02

## 2025-09-02 RX ORDER — IBUPROFEN 600 MG/1
600 TABLET, FILM COATED ORAL
Status: COMPLETED | OUTPATIENT
Start: 2025-09-02 | End: 2025-09-02

## 2025-09-02 RX ORDER — BUTALBITAL, ACETAMINOPHEN AND CAFFEINE 50; 325; 40 MG/1; MG/1; MG/1
1 TABLET ORAL
Status: COMPLETED | OUTPATIENT
Start: 2025-09-02 | End: 2025-09-02

## 2025-09-02 RX ADMIN — PROCHLORPERAZINE EDISYLATE 10 MG: 5 INJECTION INTRAMUSCULAR; INTRAVENOUS at 08:09

## 2025-09-02 RX ADMIN — BUTALBITAL, ACETAMINOPHEN, AND CAFFEINE 1 TABLET: 325; 50; 40 TABLET ORAL at 08:09

## 2025-09-02 RX ADMIN — SODIUM CHLORIDE 1000 ML: 9 INJECTION, SOLUTION INTRAVENOUS at 08:09

## 2025-09-02 RX ADMIN — ACETAMINOPHEN 1000 MG: 500 TABLET ORAL at 07:09

## 2025-09-02 RX ADMIN — DIPHENHYDRAMINE HYDROCHLORIDE 25 MG: 25 CAPSULE ORAL at 07:09

## 2025-09-02 RX ADMIN — IBUPROFEN 600 MG: 600 TABLET ORAL at 07:09

## 2025-09-02 RX ADMIN — MAGNESIUM SULFATE HEPTAHYDRATE 2 G: 40 INJECTION, SOLUTION INTRAVENOUS at 08:09

## 2025-09-02 RX ADMIN — METOCLOPRAMIDE 10 MG: 10 TABLET ORAL at 07:09

## (undated) DEVICE — SYR 10CC LUER LOCK

## (undated) DEVICE — SOL IRR STRL WATER 500ML

## (undated) DEVICE — CARTRIDGE LENS D

## (undated) DEVICE — KIT CUSTOM BASIC EYE ST / MEA

## (undated) DEVICE — SHEILD & GARTERS FOX METAL EYE

## (undated) DEVICE — HANDPIECE TRANSFORMER I/A

## (undated) DEVICE — SYR 3CC LUER LOC

## (undated) DEVICE — GLOVE BIOGEL ECLIPSE SZ 7.5

## (undated) DEVICE — KNIFE ANGLE 1MM

## (undated) DEVICE — KIT EYE PIC PACK WB

## (undated) DEVICE — NDL 18GA X1 1/2 REG BEVEL

## (undated) DEVICE — TOWEL OR NONABSORB ADH 17X26